# Patient Record
Sex: MALE | Race: WHITE | NOT HISPANIC OR LATINO | Employment: OTHER | URBAN - METROPOLITAN AREA
[De-identification: names, ages, dates, MRNs, and addresses within clinical notes are randomized per-mention and may not be internally consistent; named-entity substitution may affect disease eponyms.]

---

## 2017-08-10 ENCOUNTER — OFFICE VISIT (OUTPATIENT)
Dept: GASTROENTEROLOGY | Facility: CLINIC | Age: 72
End: 2017-08-10
Payer: MEDICARE

## 2017-08-10 ENCOUNTER — HOSPITAL ENCOUNTER (OUTPATIENT)
Dept: RADIOLOGY | Facility: HOSPITAL | Age: 72
Discharge: HOME | End: 2017-08-10
Attending: NURSE PRACTITIONER
Payer: MEDICARE

## 2017-08-10 VITALS
HEIGHT: 62 IN | BODY MASS INDEX: 23.37 KG/M2 | DIASTOLIC BLOOD PRESSURE: 72 MMHG | HEART RATE: 80 BPM | SYSTOLIC BLOOD PRESSURE: 128 MMHG | WEIGHT: 127 LBS | RESPIRATION RATE: 18 BRPM

## 2017-08-10 DIAGNOSIS — Z87.09 HISTORY OF COPD: ICD-10-CM

## 2017-08-10 DIAGNOSIS — Z99.81 ON SUPPLEMENTAL OXYGEN THERAPY: ICD-10-CM

## 2017-08-10 DIAGNOSIS — R68.81 EARLY SATIETY: ICD-10-CM

## 2017-08-10 DIAGNOSIS — Z80.0 FAMILY HISTORY OF COLON CANCER: ICD-10-CM

## 2017-08-10 DIAGNOSIS — R14.0 ABDOMINAL BLOATING: Primary | ICD-10-CM

## 2017-08-10 DIAGNOSIS — R14.0 ABDOMINAL BLOATING: ICD-10-CM

## 2017-08-10 PROCEDURE — 74020 XR ABDOMEN FLAT AND ERECT: CPT | Mod: TC,PO

## 2017-08-10 PROCEDURE — 99999 PR PBB SHADOW E&M-EST. PATIENT-LVL IV: CPT | Mod: PBBFAC,,, | Performed by: NURSE PRACTITIONER

## 2017-08-10 PROCEDURE — 1126F AMNT PAIN NOTED NONE PRSNT: CPT | Mod: S$GLB,,, | Performed by: NURSE PRACTITIONER

## 2017-08-10 PROCEDURE — 74020 XR ABDOMEN FLAT AND ERECT: CPT | Mod: 26,,, | Performed by: RADIOLOGY

## 2017-08-10 PROCEDURE — 1159F MED LIST DOCD IN RCRD: CPT | Mod: S$GLB,,, | Performed by: NURSE PRACTITIONER

## 2017-08-10 PROCEDURE — 3008F BODY MASS INDEX DOCD: CPT | Mod: S$GLB,,, | Performed by: NURSE PRACTITIONER

## 2017-08-10 PROCEDURE — 3078F DIAST BP <80 MM HG: CPT | Mod: S$GLB,,, | Performed by: NURSE PRACTITIONER

## 2017-08-10 PROCEDURE — 99214 OFFICE O/P EST MOD 30 MIN: CPT | Mod: S$GLB,,, | Performed by: NURSE PRACTITIONER

## 2017-08-10 PROCEDURE — 3074F SYST BP LT 130 MM HG: CPT | Mod: S$GLB,,, | Performed by: NURSE PRACTITIONER

## 2017-08-10 NOTE — PROGRESS NOTES
Subjective:       Patient ID: Francois Otero Sr. is a 72 y.o. male Body mass index is 23.23 kg/m².    Chief Complaint: Bloated    This patient is new to me.    GI Problem   The primary symptoms include abdominal pain (once had epigastric discomfort; denies any since). Primary symptoms do not include fever, weight loss, fatigue, nausea, vomiting, diarrhea, melena, hematemesis, hematochezia or dysuria. Primary symptoms comment: CHIEF COMPLAINT:BLOATING. The illness began more than 7 days ago (started about a year ago with bloating and early satiety). The problem has not changed since onset.  The illness is also significant for bloating (CHIEF COMPLAINT: fullness to abdomen; improved when he wears his back brace). The illness does not include chills, anorexia, dysphagia, odynophagia or constipation. Associated symptoms comments: Bowel movements once daily. Associated medical issues do not include inflammatory bowel disease, GERD, gallstones, liver disease, PUD or bowel resection. Associated medical issues comments: denies prior colonoscopy.     Review of Systems   Constitutional: Negative for appetite change, chills, fatigue, fever, unexpected weight change and weight loss.   HENT: Negative for sore throat and trouble swallowing.    Respiratory: Negative for cough, choking and shortness of breath.    Cardiovascular: Negative for chest pain.   Gastrointestinal: Positive for abdominal pain (once had epigastric discomfort; denies any since) and bloating (CHIEF COMPLAINT: fullness to abdomen; improved when he wears his back brace). Negative for anal bleeding, anorexia, blood in stool, constipation, diarrhea, dysphagia, hematemesis, hematochezia, melena, nausea, rectal pain and vomiting.   Genitourinary: Negative for difficulty urinating, dysuria, flank pain, frequency and urgency.   Neurological: Negative for weakness.       Past Medical History:   Diagnosis Date    COPD (chronic obstructive pulmonary disease)      Coronary artery disease     Hyperlipidemia     Hypertension     On home oxygen therapy     Tobacco abuse     1 PPD X 59 Yrs    Vertigo      Past Surgical History:   Procedure Laterality Date    by pass on leg       CARDIAC CATHETERIZATION      cardiac stents        Family History   Problem Relation Age of Onset    Cancer Mother     Emphysema Mother     Colon cancer Mother      unsure of age of diagnosis    Heart disease Father     Crohn's disease Neg Hx     Ulcerative colitis Neg Hx     Stomach cancer Neg Hx     Esophageal cancer Neg Hx      Wt Readings from Last 10 Encounters:   08/10/17 57.6 kg (127 lb)   07/24/17 57.1 kg (125 lb 12.8 oz)   12/29/16 56 kg (123 lb 7.3 oz)   12/21/16 58.1 kg (128 lb)   05/25/16 56.7 kg (125 lb)   05/20/16 56.7 kg (125 lb)   11/18/15 54 kg (119 lb)   11/09/15 54 kg (119 lb)   09/28/15 51.7 kg (114 lb)   09/25/15 49 kg (108 lb)     Lab Results   Component Value Date    WBC 8.47 07/20/2017    HGB 14.5 07/20/2017    HCT 44.3 07/20/2017    MCV 94 07/20/2017     07/20/2017     CMP  Sodium   Date Value Ref Range Status   07/20/2017 142 136 - 145 mmol/L Final     Potassium   Date Value Ref Range Status   07/20/2017 4.2 3.5 - 5.1 mmol/L Final     Chloride   Date Value Ref Range Status   07/20/2017 105 95 - 110 mmol/L Final     CO2   Date Value Ref Range Status   07/20/2017 26 22 - 31 mmol/L Final     Glucose   Date Value Ref Range Status   07/20/2017 100 70 - 110 mg/dL Final     Comment:     The ADA recommends the following guidelines for fasting glucose:  Normal:       less than 100 mg/dL  Prediabetes:  100 mg/dL to 125 mg/dL  Diabetes:     126 mg/dL or higher       BUN, Bld   Date Value Ref Range Status   07/20/2017 13 9 - 21 mg/dL Final     Creatinine   Date Value Ref Range Status   07/20/2017 0.84 0.50 - 1.40 mg/dL Final     Calcium   Date Value Ref Range Status   07/20/2017 9.8 8.4 - 10.2 mg/dL Final     Total Protein   Date Value Ref Range Status   12/29/2016 8.2  6.0 - 8.4 g/dL Final     Albumin   Date Value Ref Range Status   12/29/2016 4.4 3.5 - 5.2 g/dL Final     Total Bilirubin   Date Value Ref Range Status   12/29/2016 0.5 0.2 - 1.3 mg/dL Final     Comment:     For infants and newborns, interpretation of results should be based  on gestational age, weight and in agreement with clinical  observations.  Premature Infant recommended reference ranges:  Up to 24 hours.............<8.0 mg/dL  Up to 48 hours............<12.0 mg/dL  3-5 days..................<15.0 mg/dL  6-29 days.................<15.0 mg/dL       Alkaline Phosphatase   Date Value Ref Range Status   12/29/2016 77 38 - 145 U/L Final     AST   Date Value Ref Range Status   12/29/2016 30 17 - 59 U/L Final     ALT   Date Value Ref Range Status   12/29/2016 18 10 - 44 U/L Final     Anion Gap   Date Value Ref Range Status   07/20/2017 11 8 - 16 mmol/L Final     eGFR if    Date Value Ref Range Status   07/20/2017 >60 >60 mL/min/1.73 m^2 Final     eGFR if non    Date Value Ref Range Status   07/20/2017 >60 >60 mL/min/1.73 m^2 Final     Comment:     Calculation used to obtain the estimated glomerular filtration  rate (eGFR) is the CKD-EPI equation. Since race is unknown   in our information system, the eGFR values for   -American and Non--American patients are given   for each creatinine result.       Reviewed prior medical records including radiology report of 12/29/16 abdominal x-ray and ct abdomen pelvis.    Objective:      Physical Exam   Constitutional: He is oriented to person, place, and time. He appears well-developed and well-nourished. No distress.   HENT:   Mouth/Throat: Oropharynx is clear and moist and mucous membranes are normal. No oral lesions. No oropharyngeal exudate.   Eyes: Conjunctivae are normal. Pupils are equal, round, and reactive to light. No scleral icterus.   Neck: Neck supple. No tracheal deviation present.   Cardiovascular: Normal rate.     Pulmonary/Chest: Effort normal and breath sounds normal. No respiratory distress. He has no wheezes.   Abdominal: Soft. Normal appearance and bowel sounds are normal. He exhibits no distension, no abdominal bruit and no mass. There is no hepatosplenomegaly. There is no tenderness. There is no rigidity, no rebound, no guarding, no tenderness at McBurney's point and negative Tian's sign. No hernia.   Lymphadenopathy:     He has no cervical adenopathy.   Neurological: He is alert and oriented to person, place, and time.   Skin: Skin is warm and dry. No rash noted. He is not diaphoretic. No erythema. No pallor.   Non-jaundiced   Psychiatric: He has a normal mood and affect. His behavior is normal.   Nursing note and vitals reviewed.      Assessment:       1. Abdominal bloating    2. On supplemental oxygen therapy    3. History of COPD    4. Early satiety    5. Family history of colon cancer        Plan:       Abdominal bloating  -     X-Ray Abdomen Flat And Erect; Future; Expected date: 08/10/2017  -     H.Pylori Antibody IgG; Future; Expected date: 08/10/2017  - recommended OTC simethicone as directed, such as Phazyme or Gas-x  -discussed with patient about low gas diet: Reduce or eliminate these foods from your diet: Broccoli, Cauliflower, [place] sprouts, Cabbage, Cooked dried beans, Carbonated beverages (sparkling water, soda, beer, champagne)  Other Causes Of Excess Gas Include:   1) EATING TOO FAST or TALKING WHILE YOU CHEW may cause you to swallow air. This increases the amount of gas in the stomach and may worsen your symptoms.  --> Chew each mouthful completely before swallowing. Take your time.  2) OVEREATING may increase the feeling of being bloated and cause more gas.  --> When you are full, stop eating.  3) CONSTIPATION can increase the amount of normal intestinal gas.  --> Avoid constipation by increasing the amount of fiber in your diet by including whole cereal grains, fresh vegetables (except those  in the above list) and fresh fruits. High-fiber foods absorb water and carry it out of the body. When increasing the amount of fiber in your diet, you also need to increase the amount of water that you drink. You should drink at least eight 8-ounce glasses of water (two quarts) per day.  - schedule EGD, discussed procedure with patient, verbalized understanding  - schedule Colonoscopy, discussed procedure with the patient, verbalized understanding    On supplemental oxygen therapy & History of COPD  - patient not wearing currently, wears it only prn  Recommend follow-up with Primary Care Provider/pulmonology for continued evaluation and management.    Early satiety  -     X-Ray Abdomen Flat And Erect; Future; Expected date: 08/10/2017  -     H.Pylori Antibody IgG; Future; Expected date: 08/10/2017  - schedule EGD, discussed procedure with patient, verbalized understanding  -Educated patient to: avoid large meals, avoid eating within 2-3 hours of bedtime (avoid late night eating & lying down soon after eating), elevate head of bed if nocturnal symptoms are present, smoking cessation (if current smoker), & weight loss (if overweight).   -Educated to minimize/avoid high-fat foods, chocolate, caffeine, citrus, alcohol, & tomato products.  -Advised to avoid/limit use of NSAID's, since they can cause GI upset, bleeding, and/or ulcers. If needed, take with food.    Family history of colon cancer  - schedule Colonoscopy, discussed procedure with the patient, verbalized understanding    Return in about 2 months (around 10/10/2017), or if symptoms worsen or fail to improve.      If no improvement in symptoms or symptoms worsen, call/follow-up at clinic or go to ER.

## 2017-08-10 NOTE — PATIENT INSTRUCTIONS
Excess Gas  Certain foods produce gas when digested. In some people, these foods make an excessive amount of gas. This may cause bloating, burping, or increased gas passing through the rectum (flatulence).    Foods that cause gas  The following foods are more likely to cause this problem. Limit them, or remove them from your diet:  · Broccoli  · Cauliflower  · [place] sprouts  · Cabbage  · Cooked dried beans  · Fizzy (carbonated) drinks, such as sparkling water, soda, beer, and champagne  Other causes  Other causes of excess gas include:  · Eating too fast or talking while you chew. This may cause you to swallow air. This increases the amount of gas in your stomach. And it may make your symptoms worse. Chew each mouthful completely before you swallow. Take your time.  · Chewing on gum or sucking on hard candy. These cause you to swallow more often. And some of what you are swallowing is air. This leads to more gas in your stomach. Avoid chewing gum and hard candy.  · Overeating. This may increase the feeling of being bloated and cause more gas. When you are full, stop eating.   · Being constipated. This can increase the amount of normal intestinal gas. Avoid constipation by getting more fiber in your diet. Good sources of fiber include whole-grain cereal, fresh vegetables (except those in the above list), and fresh fruits. High-fiber foods absorb water and carry it out of the body. When adding more fiber to your diet, you also need to drink more water. You should drink at least 8, 8-ounce glasses of water (2 quarts) per day.  Date Last Reviewed: 8/1/2016  © 1440-2425 [employer]. [address] [place] [address], [place] [address]. All rights reserved. This information is not intended as a substitute for professional medical care. Always follow your healthcare professional's instructions.

## 2017-08-11 ENCOUNTER — TELEPHONE (OUTPATIENT)
Dept: GASTROENTEROLOGY | Facility: CLINIC | Age: 72
End: 2017-08-11

## 2017-08-11 NOTE — TELEPHONE ENCOUNTER
Please call to inform & review the results with the patient- abdominal x-ray showed no signs of intestinal obstruction, normal bowel gas pattern, but moderate amount of stool in the colon which can suggest constipation. Recommend high fiber diet/OTC fiber supplements daily as directed, drink 3-4 16 oz bottles of water daily and can try Miralax daily x 7 days and PRN thereafter.  Continue with previous recommendations and schedule colonoscopy to further evaluate symptoms.    Thanks,  Janis DUMAS FNP-C

## 2017-08-16 ENCOUNTER — TELEPHONE (OUTPATIENT)
Dept: GASTROENTEROLOGY | Facility: CLINIC | Age: 72
End: 2017-08-16

## 2017-08-16 NOTE — TELEPHONE ENCOUNTER
Pt notified of results and advised that he needs procedures scheduled. Pt is requesting that the call be made later this afternoon, after 3pm is possible.

## 2017-08-16 NOTE — TELEPHONE ENCOUNTER
----- Message from Kylie Monterroso sent at 8/16/2017  9:28 AM CDT -----  Contact: self  Patient [phone] is returning call to Nurse from this morning/please call/

## 2017-08-16 NOTE — TELEPHONE ENCOUNTER
----- Message from VANGIE Patterson sent at 8/14/2017  2:25 PM CDT -----  Please call to inform & review the results with the patient- blood work negative for h pylori bacteria. Continue with previous recommendations and schedule colonoscopy and EGD to further evaluate symptoms.  Thanks,  Janis VENCES-BIRDIE

## 2017-08-16 NOTE — TELEPHONE ENCOUNTER
"Spoke to pt.  Explained given his history of COPD would be best for pt to be scoped at [facility] "that is not going to happen"  I then offered to have the anesthesiologist review record and if they approve we can do here if not then he would need to go to the main campus "well that is not going to happen either"      Message given to [facility] nurse   "

## 2017-08-17 ENCOUNTER — TELEPHONE (OUTPATIENT)
Dept: SURGERY | Facility: HOSPITAL | Age: 72
End: 2017-08-17

## 2017-08-17 NOTE — TELEPHONE ENCOUNTER
Spoke with pt about his pulmonary function and relayed information to anesthesia, Dr. Rosales. Dr. Rosales stated pt is not a candidate to have EGD here at this facility. I informed pt that if he needed to have procedure at hospital it is for his safety. He was confused and thought that the drs didn't want to do procedure at all. After explaining, he seemed to be OK if needing to have procedure at hospital as outpatient instead of just an outpatient facility. Please call and inform patient about needing to have EGD at hospital.

## 2017-08-18 NOTE — TELEPHONE ENCOUNTER
S/w pt he understands that we can not do his EGD here that he must go to a hospital with his medical history. Pt stated [facility] does not take his insurance so I recommended pt go to [facility] to see one of our GI guys there or main campus. Pt stated he is seeing someone on Monday and our services were no longer needed.

## 2017-08-21 ENCOUNTER — OFFICE VISIT (OUTPATIENT)
Dept: GASTROENTEROLOGY | Facility: CLINIC | Age: 72
End: 2017-08-21
Payer: MEDICARE

## 2017-08-21 VITALS
WEIGHT: 126.31 LBS | BODY MASS INDEX: 23.24 KG/M2 | DIASTOLIC BLOOD PRESSURE: 74 MMHG | SYSTOLIC BLOOD PRESSURE: 140 MMHG | HEIGHT: 62 IN | RESPIRATION RATE: 18 BRPM

## 2017-08-21 DIAGNOSIS — R68.81 EARLY SATIETY: ICD-10-CM

## 2017-08-21 DIAGNOSIS — Z80.0 FAMILY HISTORY OF COLON CANCER: ICD-10-CM

## 2017-08-21 DIAGNOSIS — Z99.81 ON SUPPLEMENTAL OXYGEN THERAPY: ICD-10-CM

## 2017-08-21 DIAGNOSIS — Z87.09 HISTORY OF COPD: ICD-10-CM

## 2017-08-21 DIAGNOSIS — R14.0 ABDOMINAL BLOATING: Primary | ICD-10-CM

## 2017-08-21 PROCEDURE — 3077F SYST BP >= 140 MM HG: CPT | Mod: S$GLB,,, | Performed by: NURSE PRACTITIONER

## 2017-08-21 PROCEDURE — 1159F MED LIST DOCD IN RCRD: CPT | Mod: S$GLB,,, | Performed by: NURSE PRACTITIONER

## 2017-08-21 PROCEDURE — 99999 PR PBB SHADOW E&M-EST. PATIENT-LVL III: CPT | Mod: PBBFAC,,, | Performed by: NURSE PRACTITIONER

## 2017-08-21 PROCEDURE — 1126F AMNT PAIN NOTED NONE PRSNT: CPT | Mod: S$GLB,,, | Performed by: NURSE PRACTITIONER

## 2017-08-21 PROCEDURE — 3008F BODY MASS INDEX DOCD: CPT | Mod: S$GLB,,, | Performed by: NURSE PRACTITIONER

## 2017-08-21 PROCEDURE — 3078F DIAST BP <80 MM HG: CPT | Mod: S$GLB,,, | Performed by: NURSE PRACTITIONER

## 2017-08-21 PROCEDURE — 99214 OFFICE O/P EST MOD 30 MIN: CPT | Mod: S$GLB,,, | Performed by: NURSE PRACTITIONER

## 2017-08-21 RX ORDER — LEVALBUTEROL INHALATION SOLUTION 1.25 MG/3ML
SOLUTION RESPIRATORY (INHALATION)
Refills: 11 | COMMUNITY
Start: 2017-07-26 | End: 2017-09-13 | Stop reason: ALTCHOICE

## 2017-08-21 NOTE — PATIENT INSTRUCTIONS
Colorectal Cancer Screening    Colorectal cancer (cancer in the colon or rectum) is a leading cause of cancer deaths in the [place] But it doesnt have to be. When this cancer is found and removed early, the chances of a full recovery are very good. Because colorectal cancer rarely causes symptoms in its early stages, screening for the disease is important. Its even more crucial if you have risk factors for the disease. Learn more about colorectal cancer and its risk factors. Then talk to your healthcare provider about being screened. You could be saving your own life.  Risk factors for colorectal cancer  Your risk of having colorectal cancer increases if you:  · Are 50 years of age or older  · Have a family history or personal history of colorectal cancer or polyps  · Have a personal history of type 2 diabetes, Crohns disease, or ulcerative colitis  · Have an inherited genetic syndrome like Brennan syndrome (also known as HNPCC) or familial adenomatous polyposis (FAP)  · Are very overweight  · Are not physically active  · Smoke  · Drink a lot of alcohol  · Eat a lot of red or processed meat  The colon and rectum  Waste from food you eat enters the colon from the small intestine. As it travels through the colon, the waste (stool) loses water and becomes more solid. Intestinal muscles push it toward the sigmoid--the last section of the colon. Stool then moves into the rectum, where its stored until its ready to leave the body during a bowel movement.  How cancer develops  Polyps are growths that form on the inner lining of the colon or rectum. Most are benign, which means they arent cancerous. But over time, some polyps can become cancer (malignant). This happens when cells in these polyps begin growing abnormally. In time, malignant cells invade more and more of the colon and rectum. The cancer may also spread to nearby organs or lymph nodes or to other parts of the body. Finding and removing polyps can help  prevent cancer from ever forming.  Your screening  Screening means looking for a health problem before you have symptoms. During screening for colorectal cancer, your healthcare provider will ask about your health history, examine you, and do one or more tests.  History and exam  The history and exam involve the following:  · Health history. Your healthcare provider will ask about your health history. Mention if a family member has had colon cancer or polyps. Also mention any health problems you have had in the past.  · Digital rectal exam (DAYANARA). During a DAYANARA, the healthcare provider inserts a lubricated gloved finger into the rectum. The test is painless and takes less than a minute. Healthcare providers agree that this test alone is not enough to screen for colorectal cancer.  Screening test choices  Fecal occult blood test (FOBT) or fecal immunochemical test (FIT)  These tests check for occult blood in stool (blood you cant see). Hidden blood may be a sign of colon polyps or cancer. A small sample of stool is tested for blood in a laboratory. Most often, you collect this sample at home using a kit your healthcare provider gives you. Follow the instructions carefully for using this kit. You might need to avoid certain foods and medicines before the test, as directed.  Barium enema with contrast (double-contrast barium enema)  This test uses X-rays to provide images of the entire colon and rectum. The day before this test, you will need to do a bowel prep to clean out the colon and rectum. A bowel prep is a liquid diet plus strong laxatives or enemas. You will be awake for the test, but you may be given medicine to help you relax. At the start of the test, a radiologist (a healthcare provider who specializes in imaging tests) places a soft tube into the rectum. The tube is used to fill the colon with a contrast liquid (barium) and air. This can be uncomfortable for some people. The liquid helps the colon show up  clearly on the X-rays. Because the test uses X-rays, it exposes you to a small amount of radiation.  Virtual colonoscopy  This exam is also called a CT colonography. It uses a series of X-ray photographs to create a 3-D view of the colon and rectum. The day before the test, you will need to do a bowel prep to clean out your colon. Your healthcare provider will give you instructions on how to do this. During the procedure, you will lie on a table that is part of a special X-ray machine called a CT scanner. A small tube will be placed into your rectum to fill the colon and rectum with air. This can be uncomfortable for some people. Then, the table will move into the machine and pictures will be taken of your colon and rectum. A computer will combine these photos to create a 3-D picture. Because the test uses X-rays, it exposes you to a small amount of radiation.  Scope exams  Here are two types of scope exams:  · Colonoscopy. This test can be used to find and remove polyps anywhere in the colon or rectum. The day before the test, you will do a bowel prep. This is a liquid diet plus a strong laxative solution or an enema. The bowel prep will cleanse your colon. You will be given instructions for this. Just before the test, you are given a medicine to make you sleepy. Then, a long, flexible, lighted tube called a colonoscope is gently inserted into the rectum and guided through the entire colon. Images of the colon are viewed on a video screen. Any polyps that are found are removed and sent to a lab for testing. If a polyp cant be removed, a sample of tissue is taken and the polyp might be removed later during surgery. You will need to bring someone with you to drive you home after this test.   · Sigmoidoscopy. This test is similar to colonoscopy, but focuses only on the sigmoid colon and rectum. As with colonoscopy, bowel prep must be done the day before this test. It might not need to be as complete as the bowel prep  for a colonoscopy. You are awake during the procedure, but you may be given medicine to help you relax. During the test, the healthcare provider guides a thin, flexible, lighted tube called a sigmoidoscope through your rectum and lower colon. The images are displayed on a video screen. Polyps are removed, if possible, and sent to a lab for testing.  Colonoscopy is the only screening test that lets your healthcare provider see the entire colon and rectum. This test also lets your healthcare provider remove any pieces of tissue that need to be looked at by a lab. If something suspicious is found using any other tests, you will likely need a colonoscopy.     When to call your healthcare provider after a test  Call your healthcare provider if you have any of the following after any screening test:  · Bleeding  · Fever of 100.4°F (38°C) or higher, or as directed by your healthcare provider  · Abdominal pain  · Vomiting   Date Last Reviewed: 11/4/2015  © 2104-5787 [employer]. [address] [place] [address]. All rights reserved. This information is not intended as a substitute for professional medical care. Always follow your healthcare professional's instructions.        Abdominal Pain    Abdominal pain is pain in the stomach or belly area. Everyone has this pain from time to time. In many cases it goes away on its own. But abdominal pain can sometimes be due to a serious problem, such as appendicitis. So its important to know when to seek help.  Causes of abdominal pain  There are many possible causes of abdominal pain. Common causes in adults include:  · Constipation, diarrhea, or gas  · Stomach acid flowing back up into the esophagus (acid reflux or heartburn)  · Severe acid reflux, called GERD (gastroesophageal reflux disease)  · A sore in the lining of the stomach or small intestine (peptic ulcer)  · Inflammation of the gallbladder, liver, or pancreas  · Gallstones or kidney  stones  · Appendicitis   · Intestinal blockage   · An internal organ pushing through a muscle or other tissue (hernia)  · Urinary tract infections  · In women, menstrual cramps, fibroids, or endometriosis  · Inflammation or infection of the intestines  Diagnosing the cause of abdominal pain  Your healthcare provider will do a physical exam help find the cause of your pain. If needed, tests will be ordered. Belly pain has many possible causes. So it can be hard to find the reason for your pain. Giving details about your pain can help. Tell your provider where and when you feel the pain, and what makes it better or worse. Also let your provider know if you have other symptoms such as:  · Fever  · Tiredness  · Upset stomach (nausea)  · Vomiting  · Changes in bathroom habits  Treating abdominal pain  Some causes of pain need emergency medical treatment right away. These include appendicitis or a bowel blockage. Other problems can be treated with rest, fluids, or medicines. Your healthcare provider can give you specific instructions for treatment or self-care based on what is causing your pain.  If you have vomiting or diarrhea, sip water or other clear fluids. When you are ready to eat solid foods again, start with small amounts of easy-to-digest, low-fat foods. These include apple sauce, toast, or crackers.   When to seek medical care  Call 911 or go to the hospital right away if you:  · Cant pass stool and are vomiting  · Are vomiting blood or have bloody diarrhea or black, tarry diarrhea  · Have chest, neck, or shoulder pain  · Feel like you might pass out  · Have pain in your shoulder blades with nausea  · Have sudden, severe belly pain  · Have new, severe pain unlike any you have felt before  · Have a belly that is rigid, hard, and tender to touch  Call your healthcare provider if you have:  · Pain for more than 5 days  · Bloating for more than 2 days  · Diarrhea for more than 5 days  · A fever of 100.4°F (38.0°C)  or higher, or as directed by your provider  · Pain that gets worse  · Weight loss for no reason  · Continued lack of appetite  · Blood in your stool  How to prevent abdominal pain  Here are some tips to help prevent abdominal pain:  · Eat smaller amounts of food at one time.  · Avoid greasy, fried, or other high-fat foods.  · Avoid foods that give you gas.  · Exercise regularly.  · Drink plenty of fluids.  To help prevent GERD symptoms:  · Quit smoking.  · Reduce alcohol and certain foods that increase stomach acid.  · Avoid aspirin and over-the-counter pain and fever medicines (NSAIDS or nonsteroidal anti-inflammatory drugs), if possible  · Lose extra weight.  · Finish eating at least 2 hours before you go to bed or lie down.  · Raise the head of your bed.  Date Last Reviewed: 7/1/2016  © 5729-5853 [employer]. [address] [place] [address], [place] [address]. All rights reserved. This information is not intended as a substitute for professional medical care. Always follow your healthcare professional's instructions.

## 2017-08-21 NOTE — PROGRESS NOTES
"Subjective:       Patient ID: Francois Otero Sr. is a 72 y.o. male Body mass index is 23.1 kg/m².    Chief Complaint: Abdominal discomfort ("aggravation like you're pushing down on your stomach"; normal BM daily; was suggested to have colonoscopy- patient wants alternative)    This is an established patient    Reviewed Dayne's telephone note (Dr. Syed's staff): "Spoke to pt.  Explained given his history of COPD would be best for pt to be scoped at [facility] "that is not going to happen"  I then offered to have the anesthesiologist review record and if they approve we can do here if not then he would need to go to the [facility] "well that is not going to happen either"    Message given to ASC nurse " Spoke with Dayne and she reports the anesthesia department here told her they would not do the scope here due to his history of MAHAMED. Reviewed with the patient that endoscopies would have to be done at [facility] or [facility] and discussed about imaging studies such as barium enema and UGI with SBFT to further evaluate symptoms but informed him if any positive findings on those studies he may still need endoscopies.       GI Problem   The primary symptoms include abdominal pain (once had epigastric discomfort; denies any since; reports abdominal fullness/bloating remains). Primary symptoms do not include fever, weight loss, fatigue, nausea, vomiting, diarrhea, melena, hematemesis, hematochezia or dysuria. Primary symptoms comment: CHIEF COMPLAINT:BLOATING. The illness began more than 7 days ago (started about a year ago with bloating and early satiety). The problem has not changed since onset.  The abdominal pain has been unchanged since its onset. The severity of the abdominal pain is 0/10. The abdominal pain is relieved by nothing.   The illness is also significant for bloating (CHIEF COMPLAINT: fullness to abdomen; improved when he wears his back brace). The illness does not include chills, anorexia, dysphagia, " odynophagia or constipation. Associated symptoms comments: Bowel movements once daily; Treatment: Miralax x 4 days without any change in bowel habits; took OTC dulcolax 7 tablets at one time and reports had a large bowel movement; but symptoms have remained. Associated medical issues do not include inflammatory bowel disease, GERD, gallstones, liver disease, PUD or bowel resection. Associated medical issues comments: denies prior colonoscopy.     Review of Systems   Constitutional: Negative for appetite change, chills, fatigue, fever, unexpected weight change and weight loss.   HENT: Negative for sore throat and trouble swallowing.    Respiratory: Negative for cough, choking and shortness of breath.    Cardiovascular: Negative for chest pain.   Gastrointestinal: Positive for abdominal pain (once had epigastric discomfort; denies any since; reports abdominal fullness/bloating remains) and bloating (CHIEF COMPLAINT: fullness to abdomen; improved when he wears his back brace). Negative for anal bleeding, anorexia, blood in stool, constipation, diarrhea, dysphagia, hematemesis, hematochezia, melena, nausea, rectal pain and vomiting.   Genitourinary: Negative for difficulty urinating, dysuria, flank pain, frequency and urgency.   Neurological: Negative for weakness.       Past Medical History:   Diagnosis Date    COPD (chronic obstructive pulmonary disease)     Coronary artery disease     Hyperlipidemia     Hypertension     On home oxygen therapy     Tobacco abuse     1 PPD X 59 Yrs    Vertigo      Past Surgical History:   Procedure Laterality Date    by pass on leg       CARDIAC CATHETERIZATION      cardiac stents        Family History   Problem Relation Age of Onset    Cancer Mother     Emphysema Mother     Colon cancer Mother      unsure of age of diagnosis    Heart disease Father     Crohn's disease Neg Hx     Ulcerative colitis Neg Hx     Stomach cancer Neg Hx     Esophageal cancer Neg Hx      Wt  Readings from Last 10 Encounters:   08/21/17 57.3 kg (126 lb 5.2 oz)   08/10/17 57.6 kg (127 lb)   07/24/17 57.1 kg (125 lb 12.8 oz)   12/29/16 56 kg (123 lb 7.3 oz)   12/21/16 58.1 kg (128 lb)   05/25/16 56.7 kg (125 lb)   05/20/16 56.7 kg (125 lb)   11/18/15 54 kg (119 lb)   11/09/15 54 kg (119 lb)   09/28/15 51.7 kg (114 lb)     Lab Results   Component Value Date    WBC 8.47 07/20/2017    HGB 14.5 07/20/2017    HCT 44.3 07/20/2017    MCV 94 07/20/2017     07/20/2017     CMP  Sodium   Date Value Ref Range Status   07/20/2017 142 136 - 145 mmol/L Final     Potassium   Date Value Ref Range Status   07/20/2017 4.2 3.5 - 5.1 mmol/L Final     Chloride   Date Value Ref Range Status   07/20/2017 105 95 - 110 mmol/L Final     CO2   Date Value Ref Range Status   07/20/2017 26 22 - 31 mmol/L Final     Glucose   Date Value Ref Range Status   07/20/2017 100 70 - 110 mg/dL Final     Comment:     The ADA recommends the following guidelines for fasting glucose:  Normal:       less than 100 mg/dL  Prediabetes:  100 mg/dL to 125 mg/dL  Diabetes:     126 mg/dL or higher       BUN, Bld   Date Value Ref Range Status   07/20/2017 13 9 - 21 mg/dL Final     Creatinine   Date Value Ref Range Status   07/20/2017 0.84 0.50 - 1.40 mg/dL Final     Calcium   Date Value Ref Range Status   07/20/2017 9.8 8.4 - 10.2 mg/dL Final     Total Protein   Date Value Ref Range Status   12/29/2016 8.2 6.0 - 8.4 g/dL Final     Albumin   Date Value Ref Range Status   12/29/2016 4.4 3.5 - 5.2 g/dL Final     Total Bilirubin   Date Value Ref Range Status   12/29/2016 0.5 0.2 - 1.3 mg/dL Final     Comment:     For infants and newborns, interpretation of results should be based  on gestational age, weight and in agreement with clinical  observations.  Premature Infant recommended reference ranges:  Up to 24 hours.............<8.0 mg/dL  Up to 48 hours............<12.0 mg/dL  3-5 days..................<15.0 mg/dL  6-29 days.................<15.0 mg/dL        Alkaline Phosphatase   Date Value Ref Range Status   12/29/2016 77 38 - 145 U/L Final     AST   Date Value Ref Range Status   12/29/2016 30 17 - 59 U/L Final     ALT   Date Value Ref Range Status   12/29/2016 18 10 - 44 U/L Final     Anion Gap   Date Value Ref Range Status   07/20/2017 11 8 - 16 mmol/L Final     eGFR if    Date Value Ref Range Status   07/20/2017 >60 >60 mL/min/1.73 m^2 Final     eGFR if non    Date Value Ref Range Status   07/20/2017 >60 >60 mL/min/1.73 m^2 Final     Comment:     Calculation used to obtain the estimated glomerular filtration  rate (eGFR) is the CKD-EPI equation. Since race is unknown   in our information system, the eGFR values for   -American and Non--American patients are given   for each creatinine result.       Lab Visit on 08/10/2017   Component Date Value Ref Range Status    H Pylori IgG Interp 08/14/2017 Negative  Negative Final     Reviewed prior medical records including radiology report of 12/29/16 abdominal x-ray and ct abdomen pelvis; and 8/10/17 abdominal x-ray.    Objective:      Physical Exam   Constitutional: He is oriented to person, place, and time. He appears well-developed and well-nourished. No distress.   HENT:   Mouth/Throat: Oropharynx is clear and moist and mucous membranes are normal. No oral lesions. No oropharyngeal exudate.   Eyes: Conjunctivae are normal. No scleral icterus.   Cardiovascular: Normal rate.    Pulmonary/Chest: Effort normal and breath sounds normal. No respiratory distress.   Abdominal: Soft. Normal appearance and bowel sounds are normal. He exhibits no distension, no abdominal bruit and no mass. There is no hepatosplenomegaly. There is no tenderness. There is no rigidity, no rebound, no guarding, no tenderness at McBurney's point and negative Tian's sign. No hernia.   Neurological: He is alert and oriented to person, place, and time.   Skin: Skin is warm and dry. No rash noted. He is  not diaphoretic. No erythema. No pallor.   Non-jaundiced   Psychiatric: He has a normal mood and affect. His behavior is normal.   Nursing note and vitals reviewed.      Assessment:       1. Abdominal bloating    2. Early satiety    3. Family history of colon cancer    4. On supplemental oxygen therapy    5. History of COPD        Plan:     Patient reports he wants to think about further testing for now and will contact us to schedule the endoscopies when he decides to proceed with it. Possible endoscopies to be done at [facility]- will discuss with Dr. Charles tomorrow.    Other orders  -     Cancel: [place] Upper GI With Small Bowel; Future; Expected date: 08/21/2017  -     Cancel: [place] Barium Enema Air Contrast; Future; Expected date: 08/21/2017  Patient refused the above testing    Abdominal bloating  - recommended OTC simethicone as directed, such as Phazyme or Gas-x  -discussed with patient about low gas diet: Reduce or eliminate these foods from your diet: Broccoli, Cauliflower, Cherokee sprouts, Cabbage, Cooked dried beans, Carbonated beverages (sparkling water, soda, beer, champagne)  Other Causes Of Excess Gas Include:   1) EATING TOO FAST or TALKING WHILE YOU CHEW may cause you to swallow air. This increases the amount of gas in the stomach and may worsen your symptoms.  --> Chew each mouthful completely before swallowing. Take your time.  2) OVEREATING may increase the feeling of being bloated and cause more gas.  --> When you are full, stop eating.  3) CONSTIPATION can increase the amount of normal intestinal gas.  --> Avoid constipation by increasing the amount of fiber in your diet by including whole cereal grains, fresh vegetables (except those in the above list) and fresh fruits. High-fiber foods absorb water and carry it out of the body. When increasing the amount of fiber in your diet, you also need to increase the amount of water that you drink. You should drink at least eight 8-ounce glasses of  water (two quarts) per day.  - schedule EGD, discussed procedure with patient, verbalized understanding  - schedule Colonoscopy, discussed procedure with the patient, verbalized understanding    On supplemental oxygen therapy & History of COPD  - patient not wearing currently, wears it only prn  Recommend follow-up with Primary Care Provider/pulmonology for continued evaluation and management.    Early satiety  - schedule EGD, discussed procedure with patient, verbalized understanding  - avoid large meals, avoid eating within 2-3 hours of bedtime (avoid late night eating & lying down soon after eating), elevate head of bed if nocturnal symptoms are present, smoking cessation (if current smoker), & weight loss (if overweight).   - minimize/avoid high-fat foods, chocolate, caffeine, citrus, alcohol, & tomato products.  -avoid/limit use of NSAID's, since they can cause GI upset, bleeding, and/or ulcers. If needed, take with food.    Family history of colon cancer  - schedule Colonoscopy, discussed procedure with the patient, verbalized understanding    Return in about 1 month (around 9/21/2017), or if symptoms worsen or fail to improve.      If no improvement in symptoms or symptoms worsen, call/follow-up at clinic or go to ER.

## 2017-08-23 ENCOUNTER — TELEPHONE (OUTPATIENT)
Dept: GASTROENTEROLOGY | Facility: CLINIC | Age: 72
End: 2017-08-23

## 2017-08-23 NOTE — TELEPHONE ENCOUNTER
I discussed this case with Dr. Charles and he agreed that if patient wants to proceed with EGD and colonoscopy that he can do them at [facility] in [place] if this would help them.  Please inform the patient of the above information and schedule patient for endoscopies if patient is agreeable.  Thanks  MICHAEL

## 2017-08-24 NOTE — TELEPHONE ENCOUNTER
Pt has been scheduled for colon on 9/14. Reviewed mg citrate prep with pt. He is aware I will be mailing instructions and confirmation letter.

## 2017-08-24 NOTE — TELEPHONE ENCOUNTER
If patient wants to proceed with scheduling endoscopies. I believe he is a Dr. Yepez patient. Message forward message to his staff.  Thanks  MICHAEL

## 2017-08-25 ENCOUNTER — TELEPHONE (OUTPATIENT)
Dept: ENDOSCOPY | Facility: HOSPITAL | Age: 72
End: 2017-08-25

## 2017-08-25 NOTE — TELEPHONE ENCOUNTER
Clifford Garcia,    We received a message in the surgery center from Mr Otero's wife, Heather Angel, concerning his procedure scheduled for 9/14/17. She stated that they have additional questions about the procedure specifically where the procedure will be and if he is having a colonoscopy and EGD at the same time. Can you please call at your earliest convenience to help answer their questions.     Thank You,    Юлия Bentley

## 2017-08-25 NOTE — TELEPHONE ENCOUNTER
S/w pt wife she is aware I mailed detailed instructions yesterday after we spoke. Answered all questions regarding EGD/colon.

## 2017-09-14 PROBLEM — Z80.0 FHX: COLON CANCER: Status: ACTIVE | Noted: 2017-09-14

## 2017-09-19 ENCOUNTER — TELEPHONE (OUTPATIENT)
Dept: GASTROENTEROLOGY | Facility: CLINIC | Age: 72
End: 2017-09-19

## 2017-09-19 NOTE — TELEPHONE ENCOUNTER
----- Message from Gaetano Kelly sent at 9/19/2017  2:34 PM CDT -----  Contact: wife/Jayleen Clemens called in regarding the attached patient () and wanted to see if the results from his colonoscopy that was done on 9/14 were in yet.  Call back number is [phone]

## 2017-09-19 NOTE — TELEPHONE ENCOUNTER
----- Message from Darren Nagy sent at 9/19/2017  4:13 PM CDT -----  Contact: Heather Angel patient's wife called to discuss test results.please call back at [phone]. thanks

## 2017-09-19 NOTE — TELEPHONE ENCOUNTER
S/w pt wife reviewed results from EGD/colon. Pt is complaining of continued symptoms. He would like further recommendations. Please advise.

## 2017-09-20 NOTE — TELEPHONE ENCOUNTER
S/w pt wife reviewed recommendations. Pt does not want to start carafate without further discussing with DR. Yepez. Pt already has an appt with Dr. Yepez on October 12.

## 2017-10-02 ENCOUNTER — OFFICE VISIT (OUTPATIENT)
Dept: SURGERY | Facility: CLINIC | Age: 72
End: 2017-10-02
Payer: MEDICARE

## 2017-10-02 VITALS
BODY MASS INDEX: 23.39 KG/M2 | SYSTOLIC BLOOD PRESSURE: 152 MMHG | HEART RATE: 72 BPM | DIASTOLIC BLOOD PRESSURE: 70 MMHG | TEMPERATURE: 98 F | WEIGHT: 127.88 LBS

## 2017-10-02 DIAGNOSIS — R10.9 ABDOMINAL PAIN, UNSPECIFIED ABDOMINAL LOCATION: Primary | ICD-10-CM

## 2017-10-02 PROCEDURE — 99203 OFFICE O/P NEW LOW 30 MIN: CPT | Mod: S$GLB,,, | Performed by: SURGERY

## 2017-10-02 PROCEDURE — 99999 PR PBB SHADOW E&M-EST. PATIENT-LVL III: CPT | Mod: PBBFAC,,, | Performed by: SURGERY

## 2017-10-02 RX ORDER — LEVALBUTEROL INHALATION SOLUTION 1.25 MG/3ML
SOLUTION RESPIRATORY (INHALATION)
COMMUNITY
Start: 2017-09-26 | End: 2020-09-10

## 2017-10-02 NOTE — LETTER
October 6, 2017      Robb Yepez MD  [address] [facility] [address] [place] [address]           [facility]  [address] [facility] [address] [place] [address]  Phone: [phone]          Patient: Francois Otero Sr.   MR Number: [id number]   YOB: 1945   Date of Visit: 10/2/2017       Dear Dr. Robb Yepez:    Thank you for referring Francois Otero to me for evaluation. Attached you will find relevant portions of my assessment and plan of care.    If you have questions, please do not hesitate to call me. I look forward to following Francois Otero along with you.    Sincerely,    Peña Coles  CC:  No Recipients    If you would like to receive this communication electronically, please contact [email or web address] or [phone] to request more information on [employer] access.    For providers and/or their staff who would like to refer a patient to [facility], please contact us through our one-stop-shop provider referral line, Gilberto Lira, at [phone].    If you feel you have received this communication in error or would no longer like to receive these types of communications, please e-mail [email or web address]

## 2017-10-03 ENCOUNTER — TELEPHONE (OUTPATIENT)
Dept: GASTROENTEROLOGY | Facility: CLINIC | Age: 72
End: 2017-10-03

## 2017-10-03 NOTE — TELEPHONE ENCOUNTER
Spoke to pt & his wife, instr to come see KP, NP to discuss symptoms and further trmt. Agrees to an appt Thlyla at 11am.

## 2017-10-03 NOTE — TELEPHONE ENCOUNTER
----- Message from Naomie Woodall sent at 10/3/2017 11:45 AM CDT -----  Wife (Heather Angel)requesting to speak with nurse concerning patient's previous colonoscopy and other questions/please call back at [phone] to advise.

## 2017-10-05 ENCOUNTER — TELEPHONE (OUTPATIENT)
Dept: GASTROENTEROLOGY | Facility: CLINIC | Age: 72
End: 2017-10-05

## 2017-10-05 ENCOUNTER — OFFICE VISIT (OUTPATIENT)
Dept: GASTROENTEROLOGY | Facility: CLINIC | Age: 72
End: 2017-10-05
Payer: MEDICARE

## 2017-10-05 VITALS
HEIGHT: 65 IN | BODY MASS INDEX: 21.09 KG/M2 | DIASTOLIC BLOOD PRESSURE: 70 MMHG | HEART RATE: 90 BPM | WEIGHT: 126.56 LBS | SYSTOLIC BLOOD PRESSURE: 140 MMHG

## 2017-10-05 DIAGNOSIS — R06.02 SHORTNESS OF BREATH: ICD-10-CM

## 2017-10-05 DIAGNOSIS — Z87.09 HISTORY OF COPD: ICD-10-CM

## 2017-10-05 DIAGNOSIS — Z99.81 ON SUPPLEMENTAL OXYGEN THERAPY: ICD-10-CM

## 2017-10-05 DIAGNOSIS — R14.0 ABDOMINAL BLOATING: Primary | ICD-10-CM

## 2017-10-05 DIAGNOSIS — R68.81 EARLY SATIETY: ICD-10-CM

## 2017-10-05 DIAGNOSIS — K29.70 GASTRITIS WITHOUT BLEEDING, UNSPECIFIED CHRONICITY, UNSPECIFIED GASTRITIS TYPE: ICD-10-CM

## 2017-10-05 PROCEDURE — 99999 PR PBB SHADOW E&M-EST. PATIENT-LVL III: CPT | Mod: PBBFAC,,, | Performed by: NURSE PRACTITIONER

## 2017-10-05 PROCEDURE — 99214 OFFICE O/P EST MOD 30 MIN: CPT | Mod: S$GLB,,, | Performed by: NURSE PRACTITIONER

## 2017-10-05 RX ORDER — DICYCLOMINE HYDROCHLORIDE 10 MG/1
10 CAPSULE ORAL
Qty: 120 CAPSULE | Refills: 0 | Status: SHIPPED | OUTPATIENT
Start: 2017-10-05 | End: 2017-10-05 | Stop reason: SDUPTHER

## 2017-10-05 RX ORDER — PANTOPRAZOLE SODIUM 40 MG/1
40 TABLET, DELAYED RELEASE ORAL DAILY
Qty: 30 TABLET | Refills: 6 | Status: SHIPPED | OUTPATIENT
Start: 2017-10-05 | End: 2017-12-11

## 2017-10-05 RX ORDER — PANTOPRAZOLE SODIUM 40 MG/1
40 TABLET, DELAYED RELEASE ORAL DAILY
Qty: 30 TABLET | Refills: 6 | Status: SHIPPED | OUTPATIENT
Start: 2017-10-05 | End: 2017-10-05 | Stop reason: SDUPTHER

## 2017-10-05 RX ORDER — DICYCLOMINE HYDROCHLORIDE 10 MG/1
10 CAPSULE ORAL
Qty: 120 CAPSULE | Refills: 0 | Status: SHIPPED | OUTPATIENT
Start: 2017-10-05 | End: 2018-10-05

## 2017-10-05 NOTE — PATIENT INSTRUCTIONS
Gastritis (Adult)    Gastritis is inflammation and irritation of the stomach lining. It can be present for a short time (acute) or be long lasting (chronic). Gastritis is often caused by infection with bacteria called H pylori. More than a third of people in the [place] have this bacteria in their bodies. In many cases, H pylori causes no problems or symptoms. In some people, though, the infection irritates the stomach lining and causes gastritis. Other causes of stomach irritation include drinking alcohol or taking pain-relieving medicines called NSAIDs (such as aspirin or ibuprofen).   Symptoms of gastritis can include:  · Abdominal pain or bloating  · Loss of appetite  · Nausea or vomiting  · Vomiting blood or having black stools  · Feeling more tired than usual  An inflamed and irritated stomach lining is more likely to develop a sore called an ulcer. To help prevent this, gastritis should be treated.  Home care  If needed, medicines may be prescribed. If you have H pylori infection, treating it will likely relieve your symptoms. Other changes can help reduce stomach irritation and help it heal.  · If you have been prescribed medicines for H pylori infection, take them as directed. Take all of the medicine until it is finished or your healthcare provider tells you to stop, even if you feel better.  · Your healthcare provider may recommend avoiding NSAIDs. If you take daily aspirin for your heart or other medical reasons, do not stop without talking to your healthcare provider first.  · Avoid drinking alcohol.  · Stop smoking. Smoking can irritate the stomach and delay healing. As much as possible, stay away from second hand smoke.  Follow-up care  Follow up with your healthcare provider, or as advised by our staff. Testing may be needed to check for inflammation or an ulcer.  When to seek medical advice  Call your healthcare provider for any of the following:  · Stomach pain that gets worse or moves to the lower  right abdomen (appendix area)  · Chest pain that appears or gets worse, or spreads to the back, neck, shoulder, or arm  · Frequent vomiting (cant keep down liquids)  · Blood in the stool or vomit (red or black in color)  · Feeling weak or dizzy  · Fever of 100.4ºF (38ºC) or higher, or as directed by your healthcare provider  Date Last Reviewed: 6/22/2015 © 2000-2017 [employer]. [address] [place] [address], [place] [address]. All rights reserved. This information is not intended as a substitute for professional medical care. Always follow your healthcare professional's instructions.        Excess Gas  Certain foods produce gas when digested. In some people, these foods make an excessive amount of gas. This may cause bloating, burping, or increased gas passing through the rectum (flatulence).     Foods that cause gas  The following foods are more likely to cause this problem. Limit them, or remove them from your diet:  · Broccoli  · Cauliflower  · [place] sprouts  · Cabbage  · Cooked dried beans  · Fizzy (carbonated) drinks, such as sparkling water, soda, beer, and champagne  Other causes  Other causes of excess gas include:  · Eating too fast or talking while you chew. This may cause you to swallow air. This increases the amount of gas in your stomach. And it may make your symptoms worse. Chew each mouthful completely before you swallow. Take your time.  · Chewing on gum or sucking on hard candy. These cause you to swallow more often. And some of what you are swallowing is air. This leads to more gas in your stomach. Avoid chewing gum and hard candy.  · Overeating. This may increase the feeling of being bloated and cause more gas. When you are full, stop eating.   · Being constipated. This can increase the amount of normal intestinal gas. Avoid constipation by getting more fiber in your diet. Good sources of fiber include whole-grain cereal, fresh vegetables (except those in the above list), and fresh  fruits. High-fiber foods absorb water and carry it out of the body. When adding more fiber to your diet, you also need to drink more water. You should drink at least 8, 8-ounce glasses of water (2 quarts) per day.  Date Last Reviewed: 8/1/2016  © 5287-3615 [employer]. [address] [place] [address], [place] [address]. All rights reserved. This information is not intended as a substitute for professional medical care. Always follow your healthcare professional's instructions.

## 2017-10-05 NOTE — PROGRESS NOTES
Subjective:       Patient ID: Francois Otero Sr. is a 72 y.o. male Body mass index is 21.06 kg/m².    Chief Complaint: Follow-up (discuss symptoms)    This is established with Dr. Yepez & myself.    GI Problem   Primary symptoms do not include fever, weight loss, fatigue, abdominal pain (pain has resolved; reports abdominal fullness/bloating remains; constant and is worse after he eats), nausea, vomiting, diarrhea, melena, hematemesis or hematochezia. Primary symptoms comment: CHIEF COMPLAINT:BLOATING. The illness began more than 7 days ago (started about a year ago with bloating and early satiety). The problem has not changed since onset.  The illness is also significant for bloating (CHIEF COMPLAINT: fullness to abdomen; improved when he wears his back brace). The illness does not include chills, anorexia, dysphagia, odynophagia or constipation. Associated symptoms comments: Bowel movements 1-2 daily of firm stool; Treatment: Miralax x 4 days without any change in bowel habits; took OTC dulcolax 7 tablets at one time and reports had a large bowel movement; took zantac daily x 1.5 weeks and stopped due to no relief; symptoms have remained; has not taken any medications for it for the past 2 weeks. Associated medical issues do not include inflammatory bowel disease, GERD, gallstones, liver disease, PUD or bowel resection. Associated medical issues comments: seen by general surgery and told he has no hernia.     Review of Systems   Constitutional: Negative for appetite change, chills, fatigue, fever, unexpected weight change and weight loss.   HENT: Negative for sore throat and trouble swallowing.    Respiratory: Positive for shortness of breath (history of COPD and reports his abdominal bloating makes him short of breath at times). Negative for cough and choking.    Cardiovascular: Negative for chest pain.   Gastrointestinal: Positive for bloating (CHIEF COMPLAINT: fullness to abdomen; improved when he wears his  back brace). Negative for abdominal pain (pain has resolved; reports abdominal fullness/bloating remains; constant and is worse after he eats), anal bleeding, anorexia, blood in stool, constipation, diarrhea, dysphagia, hematemesis, hematochezia, melena, nausea, rectal pain and vomiting.   Neurological: Negative for weakness.       Past Medical History:   Diagnosis Date    Colon polyp     COPD (chronic obstructive pulmonary disease)     Coronary artery disease     Hyperlipidemia     Hypertension     On home oxygen therapy     Tobacco abuse     1 PPD X 59 Yrs    Vertigo      Past Surgical History:   Procedure Laterality Date    by pass on leg       CARDIAC CATHETERIZATION      cardiac stents       COLONOSCOPY N/A 9/14/2017    Procedure: COLONOSCOPY;  Surgeon: Robb Yepez MD;  Location: [facility];  Service: Endoscopy;  Laterality: N/A; repeat in 5 years    UPPER GASTROINTESTINAL ENDOSCOPY  09/14/2017    Dr. Yepez     Family History   Problem Relation Age of Onset    Cancer Mother     Emphysema Mother     Colon cancer Mother      unsure of age of diagnosis    Heart disease Father     Crohn's disease Neg Hx     Ulcerative colitis Neg Hx     Stomach cancer Neg Hx     Esophageal cancer Neg Hx      Wt Readings from Last 10 Encounters:   10/05/17 57.4 kg (126 lb 8.7 oz)   10/02/17 58 kg (127 lb 13.9 oz)   09/14/17 55.7 kg (122 lb 12.7 oz)   08/21/17 57.3 kg (126 lb 5.2 oz)   08/10/17 57.6 kg (127 lb)   07/24/17 57.1 kg (125 lb 12.8 oz)   12/29/16 56 kg (123 lb 7.3 oz)   12/21/16 58.1 kg (128 lb)   05/25/16 56.7 kg (125 lb)   05/20/16 56.7 kg (125 lb)     Lab Results   Component Value Date    WBC 8.47 07/20/2017    HGB 14.5 07/20/2017    HCT 44.3 07/20/2017    MCV 94 07/20/2017     07/20/2017     CMP  Sodium   Date Value Ref Range Status   07/20/2017 142 136 - 145 mmol/L Final     Potassium   Date Value Ref Range Status   07/20/2017 4.2 3.5 - 5.1 mmol/L Final     Chloride   Date Value  Ref Range Status   07/20/2017 105 95 - 110 mmol/L Final     CO2   Date Value Ref Range Status   07/20/2017 26 22 - 31 mmol/L Final     Glucose   Date Value Ref Range Status   07/20/2017 100 70 - 110 mg/dL Final     Comment:     The ADA recommends the following guidelines for fasting glucose:  Normal:       less than 100 mg/dL  Prediabetes:  100 mg/dL to 125 mg/dL  Diabetes:     126 mg/dL or higher       BUN, Bld   Date Value Ref Range Status   07/20/2017 13 9 - 21 mg/dL Final     Creatinine   Date Value Ref Range Status   07/20/2017 0.84 0.50 - 1.40 mg/dL Final     Calcium   Date Value Ref Range Status   07/20/2017 9.8 8.4 - 10.2 mg/dL Final     Total Protein   Date Value Ref Range Status   12/29/2016 8.2 6.0 - 8.4 g/dL Final     Albumin   Date Value Ref Range Status   12/29/2016 4.4 3.5 - 5.2 g/dL Final     Total Bilirubin   Date Value Ref Range Status   12/29/2016 0.5 0.2 - 1.3 mg/dL Final     Comment:     For infants and newborns, interpretation of results should be based  on gestational age, weight and in agreement with clinical  observations.  Premature Infant recommended reference ranges:  Up to 24 hours.............<8.0 mg/dL  Up to 48 hours............<12.0 mg/dL  3-5 days..................<15.0 mg/dL  6-29 days.................<15.0 mg/dL       Alkaline Phosphatase   Date Value Ref Range Status   12/29/2016 77 38 - 145 U/L Final     AST   Date Value Ref Range Status   12/29/2016 30 17 - 59 U/L Final     ALT   Date Value Ref Range Status   12/29/2016 18 10 - 44 U/L Final     Anion Gap   Date Value Ref Range Status   07/20/2017 11 8 - 16 mmol/L Final     eGFR if    Date Value Ref Range Status   07/20/2017 >60 >60 mL/min/1.73 m^2 Final     eGFR if non    Date Value Ref Range Status   07/20/2017 >60 >60 mL/min/1.73 m^2 Final     Comment:     Calculation used to obtain the estimated glomerular filtration  rate (eGFR) is the CKD-EPI equation. Since race is unknown   in our  "information system, the eGFR values for   -American and Non--American patients are given   for each creatinine result.       Admission on 09/14/2017, Discharged on 09/14/2017   Component Date Value Ref Range Status    Helicobacter, Rapid Urea 09/15/2017 Negative   Final     Reviewed prior medical records including radiology report of 12/29/16 abdominal x-ray and ct abdomen pelvis; and 8/10/17 abdominal x-ray.    9/14/17 EGD was reviewed and procedure report states:   " Impression:           - Normal esophagus.                        - Gastritis. Biopsied.                        - Normal examined duodenum.  Recommendation:       - Await pathology and Bri test results.                        - Continue present medications.                        - Discharge patient to home (ambulatory). ".  Biopsy results:   ""1.  GASTRIC BIOPSIES:  - NORMAL ANTRAL MUCOSA.   - NO HELICOBACTER SEEN. "    9/14/17 Colonoscopy was reviewed and procedure report states:   " Impression:           - Two 5 to 6 mm polyps in the rectum and in the                         distal transverse colon, removed with a cold snare.                         Resected and retrieved.                        - External internal hemorrhoids.                        - The examination was otherwise normal.  Recommendation:       - Await pathology results.                        - Repeat colonoscopy in 5 years for surveillance                         based on pathology results.                        - Discharge patient to home (ambulatory). ".  Biopsy results:   "2.  TRANSVERSE COLON POLYP BIOPSY:  - FRAGMENT OF TUBULAR ADENOMA.     3.  RECTAL POLYP, POLYPECTOMY:  - HYPERPLASTIC POLYP. "  Objective:      Physical Exam   Constitutional: He is oriented to person, place, and time. He appears well-developed and well-nourished. No distress.   HENT:   Mouth/Throat: Oropharynx is clear and moist and mucous membranes are normal. No oral lesions. No " oropharyngeal exudate.   Eyes: Conjunctivae are normal. No scleral icterus.   Cardiovascular: Normal rate.    Pulmonary/Chest: Effort normal and breath sounds normal. No respiratory distress.   Abdominal: Soft. Normal appearance and bowel sounds are normal. He exhibits no distension, no abdominal bruit and no mass. There is no hepatosplenomegaly. There is no tenderness. There is no rigidity, no rebound, no guarding, no tenderness at McBurney's point and negative Tian's sign. No hernia.   Neurological: He is alert and oriented to person, place, and time.   Skin: Skin is warm and dry. No rash noted. He is not diaphoretic. No erythema. No pallor.   Non-jaundiced   Psychiatric: He has a normal mood and affect. His behavior is normal.   Nursing note and vitals reviewed.      Assessment:       1. Abdominal bloating    2. Gastritis without bleeding, unspecified chronicity, unspecified gastritis type    3. Early satiety    4. Shortness of breath    5. History of COPD    6. On supplemental oxygen therapy        Plan:       Gastritis without bleeding, unspecified chronicity, unspecified gastritis type  -discontinue zantac due to ineffective therapy  - START    pantoprazole (PROTONIX) 40 MG tablet; Take 1 tablet (40 mg total) by mouth once daily. Before breakfast  Dispense: 30 tablet; Refill: 6, - take in the morning before breakfast, discussed about possible long term use of medication (prefer to use lowest effective dose or discontinuing if possible), pt verbalized understanding  -discussed about the different types of medications used to treat reflux and how to use them, antacids can be used PRN for breakthrough heartburn symptoms by reducing stomach acid that is already produced, H2 blockers (zantac) work by limiting the amount acid production, & PPI's work to block acid production and are taken daily, patient verbalized understanding.  -Educated patient on lifestyle modifications to help control/reduce gastritis  including: avoid large meals, avoid eating within 2-3 hours of bedtime (avoid late night eating & lying down soon after eating), elevate head of bed if nocturnal symptoms are present, smoking cessation (if current smoker), & weight loss (if overweight).   -Educated to avoid known foods which trigger symptoms & to minimize/avoid high-fat foods, chocolate, caffeine, citrus, alcohol, & tomato products.  -Advised to avoid/limit use of NSAID's, since they can cause GI upset, bleeding, and/or ulcers. If needed, take with food.  - possible course of carafate if symptoms persist    Abdominal bloating  -  START   dicyclomine (BENTYL) 10 MG capsule; Take 1 capsule (10 mg total) by mouth before meals and at bedtime as needed.  Dispense: 120 capsule; Refill: 0  - recommended OTC simethicone as directed, such as Phazyme or Gas-x  -reviewed with patient about low gas diet: Reduce or eliminate these foods from your diet: Broccoli, Cauliflower, [place] sprouts, Cabbage, Cooked dried beans, Carbonated beverages (sparkling water, soda, beer, champagne)  Other Causes Of Excess Gas Include:   1) EATING TOO FAST or TALKING WHILE YOU CHEW may cause you to swallow air. This increases the amount of gas in the stomach and may worsen your symptoms.  --> Chew each mouthful completely before swallowing. Take your time.  2) OVEREATING may increase the feeling of being bloated and cause more gas.  --> When you are full, stop eating.  3) CONSTIPATION can increase the amount of normal intestinal gas.  --> Avoid constipation by increasing the amount of fiber in your diet by including whole cereal grains, fresh vegetables (except those in the above list) and fresh fruits. High-fiber foods absorb water and carry it out of the body. When increasing the amount of fiber in your diet, you also need to increase the amount of water that you drink. You should drink at least eight 8-ounce glasses of water (two quarts) per day.    Short of breath, On  supplemental oxygen therapy & History of COPD  - patient not wearing currently, wears it only prn  Recommend follow-up with Primary Care Provider/pulmonology for continued evaluation and management.    Early satiety  - avoid large meals, avoid eating within 2-3 hours of bedtime (avoid late night eating & lying down soon after eating), elevate head of bed if nocturnal symptoms are present, smoking cessation (if current smoker), & weight loss (if overweight).   - minimize/avoid high-fat foods, chocolate, caffeine, citrus, alcohol, & tomato products.  -avoid/limit use of NSAID's, since they can cause GI upset, bleeding, and/or ulcers. If needed, take with food.  - possible gastric emptying study if symptoms persist    Return in about 1 month (around 11/5/2017), or if symptoms worsen or fail to improve, for follow-up with Dr. Yepez.    If no improvement in symptoms or symptoms worsen, call/follow-up at clinic or go to ER.

## 2017-10-06 ENCOUNTER — TELEPHONE (OUTPATIENT)
Dept: GASTROENTEROLOGY | Facility: CLINIC | Age: 72
End: 2017-10-06

## 2017-10-06 NOTE — TELEPHONE ENCOUNTER
----- Message from Naomie Woodall sent at 10/6/2017  8:06 AM CDT -----  Wife (Jayleen)requesting to speak with nurse concerning patient's medication/please call back at [phone].

## 2017-10-08 ENCOUNTER — OFFICE VISIT (OUTPATIENT)
Dept: URGENT CARE | Facility: CLINIC | Age: 72
End: 2017-10-08
Payer: MEDICARE

## 2017-10-08 VITALS
DIASTOLIC BLOOD PRESSURE: 69 MMHG | TEMPERATURE: 102 F | HEIGHT: 62 IN | HEART RATE: 100 BPM | WEIGHT: 124 LBS | RESPIRATION RATE: 20 BRPM | OXYGEN SATURATION: 94 % | SYSTOLIC BLOOD PRESSURE: 155 MMHG | BODY MASS INDEX: 22.82 KG/M2

## 2017-10-08 DIAGNOSIS — J10.1 INFLUENZA A: Primary | ICD-10-CM

## 2017-10-08 DIAGNOSIS — J44.9 CHRONIC OBSTRUCTIVE PULMONARY DISEASE, UNSPECIFIED COPD TYPE: ICD-10-CM

## 2017-10-08 DIAGNOSIS — R03.0 ELEVATED BLOOD PRESSURE READING: ICD-10-CM

## 2017-10-08 LAB
CTP QC/QA: YES
FLUAV AG NPH QL: POSITIVE
FLUBV AG NPH QL: NEGATIVE

## 2017-10-08 PROCEDURE — 87804 INFLUENZA ASSAY W/OPTIC: CPT | Mod: QW,S$GLB,, | Performed by: FAMILY MEDICINE

## 2017-10-08 PROCEDURE — 99213 OFFICE O/P EST LOW 20 MIN: CPT | Mod: S$GLB,,, | Performed by: FAMILY MEDICINE

## 2017-10-08 RX ORDER — OSELTAMIVIR PHOSPHATE 75 MG/1
75 CAPSULE ORAL 2 TIMES DAILY
Qty: 10 CAPSULE | Refills: 0 | Status: SHIPPED | OUTPATIENT
Start: 2017-10-08 | End: 2017-10-13

## 2017-10-08 RX ORDER — OSELTAMIVIR PHOSPHATE 75 MG/1
75 CAPSULE ORAL 2 TIMES DAILY
Qty: 10 CAPSULE | Refills: 0 | Status: SHIPPED | OUTPATIENT
Start: 2017-10-08 | End: 2017-10-08 | Stop reason: SDUPTHER

## 2017-10-08 NOTE — PATIENT INSTRUCTIONS

## 2017-10-08 NOTE — PROGRESS NOTES
"Subjective:       Patient ID: Francois Otero Sr. is a 72 y.o. male.    Vitals:  height is 5' 2" (1.575 m) and weight is 56.2 kg (124 lb). His oral temperature is 102.1 °F (38.9 °C) (abnormal). His blood pressure is 155/69 (abnormal) and his pulse is 100. His respiration is 20 and oxygen saturation is 94% (abnormal).     Chief Complaint: Extremity Weakness (Patient states he started having symptoms yesterday. As well as some body aches. ); Shortness of Breath (Patient states he's having a harder time breathing than usual. History of COPD. ); and Cough (Patient complains of mild cough.)    Extremity Weakness    This is a new problem. The current episode started yesterday. The problem occurs intermittently. The problem has been gradually worsening. Associated symptoms include a fever. The treatment provided no relief.   Shortness of Breath   This is a chronic problem. Associated symptoms include a fever, headaches and a sore throat. Pertinent negatives include no abdominal pain, chest pain, ear pain, leg swelling, sputum production or wheezing. His past medical history is significant for COPD.   Cough   This is a new problem. The current episode started yesterday. The problem has been unchanged. The cough is non-productive. Associated symptoms include a fever, headaches, postnasal drip, a sore throat and shortness of breath. Pertinent negatives include no chest pain, chills, ear pain, eye redness, myalgias or wheezing. His past medical history is significant for COPD.     Review of Systems   Constitution: Positive for fever, weakness and malaise/fatigue. Negative for chills.   HENT: Positive for postnasal drip and sore throat. Negative for congestion, ear pain and hoarse voice.    Eyes: Negative for discharge and redness.   Cardiovascular: Negative for chest pain, dyspnea on exertion and leg swelling.   Respiratory: Positive for cough and shortness of breath. Negative for sputum production and wheezing.  "   Musculoskeletal: Positive for extremity weakness. Negative for myalgias.   Gastrointestinal: Negative for abdominal pain and nausea.   Neurological: Positive for headaches. Negative for light-headedness.       Objective:      Physical Exam   Constitutional: He appears well-developed and well-nourished. He is cooperative.  Non-toxic appearance. He does not appear ill. No distress.   HENT:   Head: Normocephalic and atraumatic.   Right Ear: Hearing, tympanic membrane, external ear and ear canal normal.   Left Ear: Hearing, tympanic membrane, external ear and ear canal normal.   Nose: Nose normal. No mucosal edema, rhinorrhea or nasal deformity. No epistaxis. Right sinus exhibits no maxillary sinus tenderness and no frontal sinus tenderness. Left sinus exhibits no maxillary sinus tenderness and no frontal sinus tenderness.   Mouth/Throat: Uvula is midline, oropharynx is clear and moist and mucous membranes are normal. No trismus in the jaw. Normal dentition. No uvula swelling. No posterior oropharyngeal erythema.   Eyes: Conjunctivae and lids are normal. No scleral icterus.   Sclera clear bilat   Neck: Trachea normal, full passive range of motion without pain and phonation normal. Neck supple.   Cardiovascular: Normal rate, regular rhythm, normal heart sounds, intact distal pulses and normal pulses.    Pulmonary/Chest: Effort normal. No respiratory distress. He has no decreased breath sounds. He has wheezes ((mild)) in the right upper field and the left upper field.   Abdominal: Soft. Normal appearance and bowel sounds are normal. He exhibits no distension. There is no tenderness.   Neurological: He is alert.   Skin: Skin is intact. He is not diaphoretic.   Psychiatric: He has a normal mood and affect. His speech is normal and behavior is normal. Cognition and memory are normal.   Nursing note and vitals reviewed.      Assessment:       1. Influenza A    2. Chronic obstructive pulmonary disease, unspecified COPD type     3. Elevated blood pressure reading        Plan:         Influenza A  -     POCT Influenza A/B  -     oseltamivir (TAMIFLU) 75 MG capsule; Take 1 capsule (75 mg total) by mouth 2 (two) times daily.  Dispense: 10 capsule; Refill: 0  -     Tylenol PRN     Chronic obstructive pulmonary disease, unspecified COPD type         -     NEB Tx q6 PRN    Elevated blood pressure reading         -     F/U with PCP for BP re-check

## 2017-10-09 ENCOUNTER — TELEPHONE (OUTPATIENT)
Dept: GASTROENTEROLOGY | Facility: CLINIC | Age: 72
End: 2017-10-09

## 2017-10-09 NOTE — TELEPHONE ENCOUNTER
Pt notified Protonix is to be taken daily before breakfast, Bentyl as needed for discomfort, preferably before a meal. Verbalizes understanding.

## 2017-10-09 NOTE — PROGRESS NOTES
"Subjective:       Patient ID: Francois Otero Sr. is a 72 y.o. male.    Chief Complaint: Consult (possible hernia)    HPI 72-year-old male presents with the complaint of "bloating".  He wanted to make sure he does not have a hernia.  He has not seen any bulging.  He is not having any nausea or vomiting.  He has not had any obstructive symptoms.  He has normal bowel movements.    Review of Systems   Constitutional: Negative for chills, fatigue, fever and unexpected weight change.   HENT: Negative for congestion, sore throat, trouble swallowing and voice change.    Eyes: Negative for redness and visual disturbance.   Respiratory: Negative for cough, shortness of breath and wheezing.    Cardiovascular: Negative for chest pain and palpitations.   Gastrointestinal: Positive for abdominal pain. Negative for blood in stool, nausea and vomiting.   Genitourinary: Negative for dysuria, frequency, hematuria and urgency.   Musculoskeletal: Negative for arthralgias, myalgias and neck pain.   Skin: Negative for rash and wound.   Allergic/Immunologic: Negative.    Neurological: Negative for tremors, seizures, weakness and headaches.   Hematological: Does not bruise/bleed easily.   Psychiatric/Behavioral: Negative for confusion and dysphoric mood. The patient is not nervous/anxious.      Objective:     Physical Exam   Constitutional: He is oriented to person, place, and time. He appears well-developed and well-nourished. No distress.   HENT:   Head: Normocephalic and atraumatic.   Mouth/Throat: Oropharynx is clear and moist. No oropharyngeal exudate.   Eyes: Conjunctivae and EOM are normal. Pupils are equal, round, and reactive to light. No scleral icterus.   Neck: Normal range of motion. Neck supple. No thyromegaly present.   Cardiovascular: Normal rate, regular rhythm, normal heart sounds and intact distal pulses.    No murmur heard.  Pulmonary/Chest: Effort normal and breath sounds normal. No respiratory distress. He has no " wheezes. He has no rales.   Abdominal: Soft. Bowel sounds are normal. He exhibits distension (very mild distention). There is no tenderness. No hernia.   Musculoskeletal: Normal range of motion. He exhibits no edema or tenderness.   Lymphadenopathy:     He has no cervical adenopathy.   Neurological: He is alert and oriented to person, place, and time. No cranial nerve deficit.   Skin: Skin is warm and dry. No rash noted. No erythema.   Psychiatric: He has a normal mood and affect. His behavior is normal. Judgment normal.     Assessment:     Encounter Diagnosis   Name Primary?    Abdominal pain, unspecified abdominal location Yes       Plan:      1. No hernia is present on exam.  2. Pt will follow up as needed.

## 2017-12-19 ENCOUNTER — OFFICE VISIT (OUTPATIENT)
Dept: GASTROENTEROLOGY | Facility: CLINIC | Age: 72
End: 2017-12-19
Payer: MEDICARE

## 2017-12-19 VITALS — BODY MASS INDEX: 23 KG/M2 | WEIGHT: 125 LBS | HEIGHT: 62 IN

## 2017-12-19 DIAGNOSIS — R10.84 GENERALIZED ABDOMINAL PAIN: Primary | ICD-10-CM

## 2017-12-19 DIAGNOSIS — R14.0 ABDOMINAL BLOATING: ICD-10-CM

## 2017-12-19 PROCEDURE — 99213 OFFICE O/P EST LOW 20 MIN: CPT | Mod: S$GLB,,, | Performed by: INTERNAL MEDICINE

## 2017-12-19 PROCEDURE — 99999 PR PBB SHADOW E&M-EST. PATIENT-LVL III: CPT | Mod: PBBFAC,,, | Performed by: INTERNAL MEDICINE

## 2017-12-19 NOTE — PROGRESS NOTES
"Pt returns re: abd bloating. Symptom chronic, dating back several years. Vague sensation of abd "fullness", no discreet pain. No vomiting. No fever or jaundice. No bleeding. No weight loss. Denies change bowel habits. Recent EGD and C-scope noted.     REVIEW OF SYSTEMS:   Constitutional: Negative for fever, appetite change and unexpected weight change.  HENT: Negative for sore throat and trouble swallowing.  Eyes: Negative for visual disturbance.  Respiratory: Negative for chest tightness, shortness of breath and wheezing.  Cardiovascular: Negative for chest pain.  Gastrointestinal:  as per HPI    PHYSICAL EXAMINATION:                                                        GENERAL:  Comfortable, in no acute distress.      SKIN: Non-jaundiced.                             HEENT EXAM:  Nonicteric.  No adenopathy.  Oropharynx is clear.               NECK:  Supple.                                                               LUNGS:  Clear.                                                               CARDIAC:  Regular rate and rhythm.  S1, S2.  No murmur.                      ABDOMEN:  Soft, positive bowel sounds, nontender.  No hepatosplenomegaly or masses.  No rebound or guarding.                                             EXTREMITIES:  No edema.       IMP: Abd bloating (chronic) - failed OTC simethicone products, as well as anti-spasm med (bentyl).    REC: Abd U/S.  "

## 2017-12-29 ENCOUNTER — HOSPITAL ENCOUNTER (OUTPATIENT)
Dept: RADIOLOGY | Facility: HOSPITAL | Age: 72
Discharge: HOME | End: 2017-12-29
Attending: INTERNAL MEDICINE
Payer: MEDICARE

## 2017-12-29 DIAGNOSIS — R10.84 GENERALIZED ABDOMINAL PAIN: ICD-10-CM

## 2017-12-29 DIAGNOSIS — K21.9 GASTROESOPHAGEAL REFLUX DISEASE, ESOPHAGITIS PRESENCE NOT SPECIFIED: Primary | ICD-10-CM

## 2017-12-29 PROCEDURE — 76700 US EXAM ABDOM COMPLETE: CPT | Mod: TC,PO

## 2017-12-29 PROCEDURE — 76700 US EXAM ABDOM COMPLETE: CPT | Mod: 26,,, | Performed by: RADIOLOGY

## 2018-01-23 PROBLEM — J10.1 INFLUENZA A: Status: RESOLVED | Noted: 2017-10-08 | Resolved: 2018-01-23

## 2018-11-02 ENCOUNTER — HOSPITAL ENCOUNTER (OUTPATIENT)
Dept: RADIOLOGY | Facility: HOSPITAL | Age: 73
Discharge: HOME | End: 2018-11-02
Attending: INTERNAL MEDICINE
Payer: MEDICARE

## 2018-11-02 PROCEDURE — 25500020 PHARM REV CODE 255: Mod: PO | Performed by: INTERNAL MEDICINE

## 2018-11-02 PROCEDURE — 74178 CT ABD&PLV WO CNTR FLWD CNTR: CPT | Mod: TC,PO

## 2018-11-02 PROCEDURE — 74178 CT ABD&PLV WO CNTR FLWD CNTR: CPT | Mod: 26,,, | Performed by: RADIOLOGY

## 2018-11-02 RX ADMIN — IOHEXOL 30 ML: 350 INJECTION, SOLUTION INTRAVENOUS at 09:11

## 2018-11-02 RX ADMIN — IOHEXOL 75 ML: 350 INJECTION, SOLUTION INTRAVENOUS at 09:11

## 2019-09-13 ENCOUNTER — TELEPHONE (OUTPATIENT)
Dept: GASTROENTEROLOGY | Facility: CLINIC | Age: 74
End: 2019-09-13

## 2019-09-13 NOTE — TELEPHONE ENCOUNTER
----- Message from Sarah Guidry sent at 9/13/2019  3:02 PM CDT -----  Contact: Wife  Type: Needs Medical Advice    Who Called:  Anay Friedman Call Back Number: [phone]  Additional Information: Patient wife would like a call back in regards to  When the patient had his last colonoscopy please call back to advise

## 2019-09-17 ENCOUNTER — TELEPHONE (OUTPATIENT)
Dept: GASTROENTEROLOGY | Facility: CLINIC | Age: 74
End: 2019-09-17

## 2019-09-17 NOTE — TELEPHONE ENCOUNTER
Spoke w/ wife in regards to scheduling procedure but she informed me that they had made an appt somewhere else.

## 2019-09-17 NOTE — TELEPHONE ENCOUNTER
----- Message from Gaetano Kelly sent at 9/16/2019 11:11 AM CDT -----  Contact: Wife/Alex Johnson called in regarding the attached patient () and stated she is trying to get patient scheduled for a colonoscopy.  Alex stated his last one was in 2017 at [facility] and patient is still having pains in his stomach?    Alex's call back number is [phone]

## 2019-11-21 PROBLEM — E03.4 HYPOTHYROIDISM DUE TO ACQUIRED ATROPHY OF THYROID: Status: ACTIVE | Noted: 2019-11-21

## 2020-08-24 ENCOUNTER — OFFICE VISIT (OUTPATIENT)
Dept: OPTOMETRY | Facility: CLINIC | Age: 75
End: 2020-08-24
Payer: MEDICARE

## 2020-08-24 DIAGNOSIS — H25.13 NUCLEAR SCLEROSIS, BILATERAL: Primary | ICD-10-CM

## 2020-08-24 DIAGNOSIS — H52.4 MYOPIA WITH PRESBYOPIA OF BOTH EYES: ICD-10-CM

## 2020-08-24 DIAGNOSIS — H43.393 VITREOUS FLOATERS, BILATERAL: ICD-10-CM

## 2020-08-24 DIAGNOSIS — H52.13 MYOPIA WITH PRESBYOPIA OF BOTH EYES: ICD-10-CM

## 2020-08-24 DIAGNOSIS — Z13.5 GLAUCOMA SCREENING: ICD-10-CM

## 2020-08-24 PROCEDURE — 92015 PR REFRACTION: ICD-10-PCS | Mod: S$GLB,,, | Performed by: OPTOMETRIST

## 2020-08-24 PROCEDURE — 92004 PR EYE EXAM, NEW PATIENT,COMPREHESV: ICD-10-PCS | Mod: S$GLB,,, | Performed by: OPTOMETRIST

## 2020-08-24 PROCEDURE — 99999 PR PBB SHADOW E&M-EST. PATIENT-LVL III: CPT | Mod: PBBFAC,,, | Performed by: OPTOMETRIST

## 2020-08-24 PROCEDURE — 92004 COMPRE OPH EXAM NEW PT 1/>: CPT | Mod: S$GLB,,, | Performed by: OPTOMETRIST

## 2020-08-24 PROCEDURE — 99999 PR PBB SHADOW E&M-EST. PATIENT-LVL III: ICD-10-PCS | Mod: PBBFAC,,, | Performed by: OPTOMETRIST

## 2020-08-24 PROCEDURE — 92015 DETERMINE REFRACTIVE STATE: CPT | Mod: S$GLB,,, | Performed by: OPTOMETRIST

## 2020-08-24 NOTE — PROGRESS NOTES
HPI     Concerns About Ocular Health      Additional comments: Ocular health exam              Comments     DLE: x 2010    Pt states decrease in dist and near x 2 months. Reads better without gls.   Has had ocular migraines in past.           Last edited by Cheryle Quintana on 8/24/2020 10:09 AM. (History)        ROS     Positive for: Eyes    Negative for: Constitutional, Gastrointestinal, Neurological, Skin,   Genitourinary, Musculoskeletal, HENT, Endocrine, Cardiovascular,   Respiratory, Psychiatric, Allergic/Imm, Heme/Lymph    Last edited by DES Avendano, OD on 8/24/2020 10:30 AM. (History)        Assessment /Plan     For exam results, see Encounter Report.    Nuclear sclerosis, bilateral    Vitreous floaters, bilateral    Glaucoma screening    Myopia with presbyopia of both eyes      1. Vis sig w/ myopic shift OU, not ready for consult   Get specs for driving  2. RD precautions given and reviewed. Patient knows to call/ message if any further changes in symptoms occur.  3. Not suspect, but monitor with crowded disc    4. Updated specs for distance     Discussed and educated patient on current findings /plan.  RTC 1 year, prn if any changes / issues

## 2020-08-24 NOTE — PATIENT INSTRUCTIONS
"DRY EYES -- BURNING OR EMERALD SYMPTOMS:  Use Over The Counter artificial tears as needed for dry eye symptoms.   Some common brands include:  Systane, Optive, Refresh, and Thera-Tears.  These drops can be used as frequently as desired, but may be most helpful use during long periods of concentrated work.  For example, reading / working at the computer. Start with 3-4x per day.     Nighttime Ophthalmic gel or ointments are available: Refresh PM, Genteal, and Lacrilube.    Avoid drops that "get redness out" (Visine, Murine, Clear Eyes), as these may contain medication that could further irritate the eyes, especially with chronic use.    ALLERGY EYES -- ITCHING SYMPTOMS:  Over the counter medications include--Pataday, Zaditor, and Alaway.  Use as directed 1-2 drops daily for symptoms of itching / watering eyes.  These drops will not help for dry eye or exposure symptoms.    REDNESS RELIEF:  Lumify---is a good redness reliever that will not cause irritation if used chronically.         FLASHES / FLOATERS / POSTERIOR VITREOUS DETACHMENT    Call the clinic if you have any further changes in symptoms.  Including:  Increased numbers of floaters or flashing lights, dimness or darkness that moves through or stays constant in your vision, or any pain in the eye (s).    You may sometimes see small specks or clouds moving in your field of vision.  They are called FLOATERS.  You can often see them when looking at a plain background, like a blank wall or blue catalina.  Floaters are actually tiny clumps of gel or cells inside the VITREOUS, the clear jelly-like fluid that fills the inside of your eye.    While these objects look like they are in front of your eye, they are actually floating inside.  What you see are the shadows they cast on the RETINA, the nerve layer at the back of the eye that senses light and allows you to see.      POSTERIOR VITREOUS DETACHMENT    The appearance of new floaters may be alarming.  If you suddenly " develop new floaters, you should contact your eye care professional  right away.    The retina can tear if the shrinking vitreous pulls away from the wall of the eye.  This sometimes causes a small amount of bleeding in the eye that may appear as new floaters.    A torn retina is always a serious problem, since it can lead to a retinal detachment.  You should see your eye care professional as soon as possible if:     even one new floater appears suddenly;   you see sudden flashes of light;   you notice other symptoms, like the loss of side vision, or a curtain closes down in your vision        POSTERIOR VITREOUS DETACHMENT is more common for people who:     are nearsighted;   have had cataract surgery;   have had YAG laser surgery of the eye;   have had inflammation inside the eye;   are over age 60.      While some floaters may remain visible, many of them will fade over time and become less noticeable.  Even if you've had some floaters for years, you should have your eyes checked as soon as possible if you notice new ones.    FLASHING LIGHTS    When the vitreous gel rubs or pulls on the retina, you may see what look like flashing lights or lightning streaks.  These flashes can appear off and on for several weeks or months.      Some people experience flashes of light that appear as jagged lines or heat waves in both eyes, lasting 10-20 minutes.  These flashes are caused by a spasm of blood vessels in the brain, which is called a migraine.    If a headache follows these flashes, it's called a migraine headache.  If   no headache occurs, these flashes are called Ophthalmic or Ocular Migraine.            Early Cataracts--not visually significant for surgery consultation.    What Are Cataracts?  A clear lens in the eye focuses light. This lets the eye see images sharply. With age, the lens slowly becomes cloudy. The cloudy lens is a cataract. A cataract scatters light and makes it hard for the eye to  focus. Cataracts often form in both eyes. But one lens may cloud faster than the other.      The Aging of Your Lens    Your lens may cloud so slowly that you don`t notice any vision changes at first. But as the cataract gets worse, the eye has a harder time focusing. In early stages, glasses may help you see better. As the lens gets cloudier, your doctor may recommend surgery to restore your vision.

## 2020-12-03 PROBLEM — Z71.89 ADVANCE CARE PLANNING: Status: ACTIVE | Noted: 2020-12-03

## 2020-12-03 PROBLEM — E87.1 HYPONATREMIA: Status: ACTIVE | Noted: 2020-12-03

## 2020-12-03 PROBLEM — U07.1 COVID-19 VIRUS DETECTED: Status: ACTIVE | Noted: 2020-12-03

## 2020-12-03 PROBLEM — U07.1 PNEUMONIA DUE TO COVID-19 VIRUS: Status: ACTIVE | Noted: 2020-12-03

## 2020-12-03 PROBLEM — J96.12 CHRONIC HYPERCAPNIC RESPIRATORY FAILURE: Status: ACTIVE | Noted: 2020-12-03

## 2020-12-03 PROBLEM — N17.9 ACUTE RENAL FAILURE: Status: ACTIVE | Noted: 2020-12-03

## 2020-12-03 PROBLEM — R73.9 HYPERGLYCEMIA: Status: ACTIVE | Noted: 2020-12-03

## 2020-12-03 PROBLEM — J12.82 PNEUMONIA DUE TO COVID-19 VIRUS: Status: ACTIVE | Noted: 2020-12-03

## 2021-02-02 ENCOUNTER — CLINICAL SUPPORT (OUTPATIENT)
Dept: URGENT CARE | Facility: CLINIC | Age: 76
End: 2021-02-02
Payer: MEDICARE

## 2021-02-02 DIAGNOSIS — Z20.822 ENCOUNTER FOR LABORATORY TESTING FOR COVID-19 VIRUS: Primary | ICD-10-CM

## 2021-02-02 LAB
CTP QC/QA: YES
SARS-COV-2 RDRP RESP QL NAA+PROBE: NEGATIVE

## 2021-02-02 PROCEDURE — U0002: ICD-10-PCS | Mod: QW,S$GLB,, | Performed by: FAMILY MEDICINE

## 2021-02-02 PROCEDURE — U0002 COVID-19 LAB TEST NON-CDC: HCPCS | Mod: QW,S$GLB,, | Performed by: FAMILY MEDICINE

## 2021-03-04 PROBLEM — R43.2 ABNORMAL SENSE OF TASTE: Status: ACTIVE | Noted: 2021-03-04

## 2021-07-27 ENCOUNTER — OFFICE VISIT (OUTPATIENT)
Dept: FAMILY MEDICINE | Facility: CLINIC | Age: 76
End: 2021-07-27
Payer: MEDICARE

## 2021-07-27 VITALS — TEMPERATURE: 98 F | HEART RATE: 76 BPM | OXYGEN SATURATION: 97 %

## 2021-07-27 DIAGNOSIS — Z20.822 SUSPECTED COVID-19 VIRUS INFECTION: Primary | ICD-10-CM

## 2021-07-27 PROCEDURE — 99999 PR PBB SHADOW E&M-EST. PATIENT-LVL II: CPT | Mod: PBBFAC,,, | Performed by: INTERNAL MEDICINE

## 2021-07-27 PROCEDURE — 1159F MED LIST DOCD IN RCRD: CPT | Mod: CPTII,S$GLB,, | Performed by: INTERNAL MEDICINE

## 2021-07-27 PROCEDURE — U0005 INFEC AGEN DETEC AMPLI PROBE: HCPCS | Performed by: INTERNAL MEDICINE

## 2021-07-27 PROCEDURE — 1160F PR REVIEW ALL MEDS BY PRESCRIBER/CLIN PHARMACIST DOCUMENTED: ICD-10-PCS | Mod: CPTII,S$GLB,, | Performed by: INTERNAL MEDICINE

## 2021-07-27 PROCEDURE — U0003 INFECTIOUS AGENT DETECTION BY NUCLEIC ACID (DNA OR RNA); SEVERE ACUTE RESPIRATORY SYNDROME CORONAVIRUS 2 (SARS-COV-2) (CORONAVIRUS DISEASE [COVID-19]), AMPLIFIED PROBE TECHNIQUE, MAKING USE OF HIGH THROUGHPUT TECHNOLOGIES AS DESCRIBED BY CMS-2020-01-R: HCPCS | Performed by: INTERNAL MEDICINE

## 2021-07-27 PROCEDURE — 99999 PR PBB SHADOW E&M-EST. PATIENT-LVL II: ICD-10-PCS | Mod: PBBFAC,,, | Performed by: INTERNAL MEDICINE

## 2021-07-27 PROCEDURE — 99203 OFFICE O/P NEW LOW 30 MIN: CPT | Mod: S$GLB,,, | Performed by: INTERNAL MEDICINE

## 2021-07-27 PROCEDURE — 1160F RVW MEDS BY RX/DR IN RCRD: CPT | Mod: CPTII,S$GLB,, | Performed by: INTERNAL MEDICINE

## 2021-07-27 PROCEDURE — 99203 PR OFFICE/OUTPT VISIT, NEW, LEVL III, 30-44 MIN: ICD-10-PCS | Mod: S$GLB,,, | Performed by: INTERNAL MEDICINE

## 2021-07-27 PROCEDURE — 1159F PR MEDICATION LIST DOCUMENTED IN MEDICAL RECORD: ICD-10-PCS | Mod: CPTII,S$GLB,, | Performed by: INTERNAL MEDICINE

## 2021-07-28 LAB
SARS-COV-2 RNA RESP QL NAA+PROBE: NOT DETECTED
SARS-COV-2- CYCLE NUMBER: -1

## 2022-03-21 DIAGNOSIS — H91.90 HEARING DIFFICULTY, UNSPECIFIED LATERALITY: Primary | ICD-10-CM

## 2023-01-10 ENCOUNTER — TELEPHONE (OUTPATIENT)
Dept: RADIOLOGY | Facility: HOSPITAL | Age: 78
End: 2023-01-10
Payer: MEDICARE

## 2023-01-10 NOTE — TELEPHONE ENCOUNTER
----- Message from Diana Carrasquillo MA sent at 1/9/2023  1:29 PM CST -----  Regarding: PET SCAN  Our providers are ordering a PET Scan for this patient. Please call this patient and schedule. Thank you so much.

## 2023-01-12 ENCOUNTER — TELEPHONE (OUTPATIENT)
Dept: HEMATOLOGY/ONCOLOGY | Facility: CLINIC | Age: 78
End: 2023-01-12
Payer: MEDICARE

## 2023-01-12 NOTE — NURSING
Spoke with patient regarding referral. Patient has requested to wait until PET is reviewed by EMIL Martinez on 1/25.  If at that time he needs to see us he will then schedule.  Agreed to wait and verbalized understanding.  Will follow

## 2023-01-13 ENCOUNTER — HOSPITAL ENCOUNTER (OUTPATIENT)
Dept: RADIOLOGY | Facility: HOSPITAL | Age: 78
Discharge: HOME | End: 2023-01-13
Attending: NURSE PRACTITIONER
Payer: MEDICARE

## 2023-01-13 DIAGNOSIS — R91.1 PULMONARY NODULE 1 CM OR GREATER IN DIAMETER: ICD-10-CM

## 2023-01-13 LAB — GLUCOSE SERPL-MCNC: 91 MG/DL (ref 70–110)

## 2023-01-13 PROCEDURE — 78815 PET IMAGE W/CT SKULL-THIGH: CPT | Mod: 26,PI,, | Performed by: RADIOLOGY

## 2023-01-13 PROCEDURE — 78815 NM PET CT ROUTINE: ICD-10-PCS | Mod: 26,PI,, | Performed by: RADIOLOGY

## 2023-01-13 PROCEDURE — A9552 F18 FDG: HCPCS | Mod: PN

## 2023-01-13 NOTE — PROGRESS NOTES
PET Imaging Questionnaire    Are you a Diabetic? Recent Blood Sugar level? No    Are you anemic? Bone Marrow Stimulation Meds? No    Have you had a CT Scan, if so when & where was your last one? Yes -     Have you had a PET Scan, if so when & where was your last one? No    Chemotherapy or currently on Chemotherapy? No    Radiation therapy? No    Surgical History:   Past Surgical History:   Procedure Laterality Date    by pass on leg       CARDIAC CATHETERIZATION      cardiac stents       COLONOSCOPY N/A 9/14/2017    Procedure: COLONOSCOPY;  Surgeon: Robb Yepez MD;  Location: [facility];  Service: Endoscopy;  Laterality: N/A; repeat in 5 years    UPPER GASTROINTESTINAL ENDOSCOPY  09/14/2017    Dr. Yepez        Have you been fasting for at least 6 hours? Yes    Is there any chance you may be pregnant or breastfeeding? No    Assay: 13.48 MCi@:9.35   Injection Site:rt ac     Residual: .437 mCi@: 9.37   Technologist: Vane Ryan Injected:13.04mCi

## 2023-03-02 ENCOUNTER — HOSPITAL ENCOUNTER (OUTPATIENT)
Dept: RADIOLOGY | Facility: HOSPITAL | Age: 78
Discharge: HOME | End: 2023-03-02
Attending: NURSE PRACTITIONER
Payer: MEDICARE

## 2023-03-02 DIAGNOSIS — R91.1 PULMONARY NODULE 1 CM OR GREATER IN DIAMETER: ICD-10-CM

## 2023-03-02 DIAGNOSIS — R94.2 ABNORMAL PET OF RIGHT LUNG: ICD-10-CM

## 2023-03-02 LAB — GLUCOSE SERPL-MCNC: 87 MG/DL (ref 70–110)

## 2023-03-02 PROCEDURE — 78815 PET IMAGE W/CT SKULL-THIGH: CPT | Mod: 26,PS,, | Performed by: RADIOLOGY

## 2023-03-02 PROCEDURE — 78815 PET IMAGE W/CT SKULL-THIGH: CPT | Mod: TC,PN

## 2023-03-02 PROCEDURE — 78815 NM PET CT ROUTINE: ICD-10-PCS | Mod: 26,PS,, | Performed by: RADIOLOGY

## 2023-03-02 PROCEDURE — A9552 F18 FDG: HCPCS | Mod: PN

## 2023-03-02 NOTE — PROGRESS NOTES
PET Imaging Questionnaire    Are you a Diabetic? Recent Blood Sugar level? No    Are you anemic? Bone Marrow Stimulation Meds? No    Have you had a CT Scan, if so when & where was your last one? Yes -     Have you had a PET Scan, if so when & where was your last one? Yes -     Chemotherapy or currently on Chemotherapy? No    Radiation therapy? No    Surgical History:   Past Surgical History:   Procedure Laterality Date    by pass on leg       CARDIAC CATHETERIZATION      cardiac stents       COLONOSCOPY N/A 9/14/2017    Procedure: COLONOSCOPY;  Surgeon: Robb Yepez MD;  Location: [facility];  Service: Endoscopy;  Laterality: N/A; repeat in 5 years    UPPER GASTROINTESTINAL ENDOSCOPY  09/14/2017    Dr. Yepez        Have you been fasting for at least 6 hours? Yes    Is there any chance you may be pregnant or breastfeeding? No    Assay: 12.54 MCi@:8:20   Injection Site:RT AC    Residual: 1.304 mCi@: 8:22   Technologist: Ki Ryan Injected:11.24mCi

## 2023-06-06 ENCOUNTER — HOSPITAL ENCOUNTER (OUTPATIENT)
Dept: RADIOLOGY | Facility: HOSPITAL | Age: 78
Discharge: HOME | End: 2023-06-06
Attending: NURSE PRACTITIONER
Payer: MEDICARE

## 2023-06-06 DIAGNOSIS — R59.0 LOCALIZED ENLARGED LYMPH NODES: ICD-10-CM

## 2023-06-06 PROCEDURE — 71260 CT THORAX DX C+: CPT | Mod: TC,PO

## 2023-06-06 PROCEDURE — 71260 CT THORAX DX C+: CPT | Mod: 26,,, | Performed by: RADIOLOGY

## 2023-06-06 PROCEDURE — 25500020 PHARM REV CODE 255: Mod: PO | Performed by: NURSE PRACTITIONER

## 2023-06-06 PROCEDURE — 71260 CT CHEST WITH CONTRAST: ICD-10-PCS | Mod: 26,,, | Performed by: RADIOLOGY

## 2023-06-06 RX ADMIN — IOHEXOL 75 ML: 350 INJECTION, SOLUTION INTRAVENOUS at 09:06

## 2023-06-13 ENCOUNTER — OFFICE VISIT (OUTPATIENT)
Dept: CARDIOLOGY | Facility: CLINIC | Age: 78
End: 2023-06-13
Payer: MEDICARE

## 2023-06-13 VITALS
WEIGHT: 114.88 LBS | HEART RATE: 93 BPM | DIASTOLIC BLOOD PRESSURE: 72 MMHG | BODY MASS INDEX: 21.14 KG/M2 | HEIGHT: 62 IN | SYSTOLIC BLOOD PRESSURE: 169 MMHG

## 2023-06-13 DIAGNOSIS — J41.0 SIMPLE CHRONIC BRONCHITIS: Primary | ICD-10-CM

## 2023-06-13 DIAGNOSIS — I25.10 CORONARY ARTERY DISEASE INVOLVING NATIVE CORONARY ARTERY OF NATIVE HEART WITHOUT ANGINA PECTORIS: ICD-10-CM

## 2023-06-13 DIAGNOSIS — E78.2 MIXED HYPERLIPIDEMIA: ICD-10-CM

## 2023-06-13 DIAGNOSIS — I10 PRIMARY HYPERTENSION: ICD-10-CM

## 2023-06-13 DIAGNOSIS — R94.31 ABNORMAL ELECTROCARDIOGRAM (ECG) (EKG): ICD-10-CM

## 2023-06-13 DIAGNOSIS — I73.9 PAD (PERIPHERAL ARTERY DISEASE): ICD-10-CM

## 2023-06-13 PROCEDURE — 1101F PT FALLS ASSESS-DOCD LE1/YR: CPT | Mod: CPTII,S$GLB,, | Performed by: INTERNAL MEDICINE

## 2023-06-13 PROCEDURE — 99999 PR PBB SHADOW E&M-EST. PATIENT-LVL III: ICD-10-PCS | Mod: PBBFAC,,, | Performed by: INTERNAL MEDICINE

## 2023-06-13 PROCEDURE — 99204 PR OFFICE/OUTPT VISIT, NEW, LEVL IV, 45-59 MIN: ICD-10-PCS | Mod: S$GLB,,, | Performed by: INTERNAL MEDICINE

## 2023-06-13 PROCEDURE — 3077F SYST BP >= 140 MM HG: CPT | Mod: CPTII,S$GLB,, | Performed by: INTERNAL MEDICINE

## 2023-06-13 PROCEDURE — 3078F PR MOST RECENT DIASTOLIC BLOOD PRESSURE < 80 MM HG: ICD-10-PCS | Mod: CPTII,S$GLB,, | Performed by: INTERNAL MEDICINE

## 2023-06-13 PROCEDURE — 1160F PR REVIEW ALL MEDS BY PRESCRIBER/CLIN PHARMACIST DOCUMENTED: ICD-10-PCS | Mod: CPTII,S$GLB,, | Performed by: INTERNAL MEDICINE

## 2023-06-13 PROCEDURE — 1159F MED LIST DOCD IN RCRD: CPT | Mod: CPTII,S$GLB,, | Performed by: INTERNAL MEDICINE

## 2023-06-13 PROCEDURE — 3078F DIAST BP <80 MM HG: CPT | Mod: CPTII,S$GLB,, | Performed by: INTERNAL MEDICINE

## 2023-06-13 PROCEDURE — 1126F PR PAIN SEVERITY QUANTIFIED, NO PAIN PRESENT: ICD-10-PCS | Mod: CPTII,S$GLB,, | Performed by: INTERNAL MEDICINE

## 2023-06-13 PROCEDURE — 1101F PR PT FALLS ASSESS DOC 0-1 FALLS W/OUT INJ PAST YR: ICD-10-PCS | Mod: CPTII,S$GLB,, | Performed by: INTERNAL MEDICINE

## 2023-06-13 PROCEDURE — 3288F PR FALLS RISK ASSESSMENT DOCUMENTED: ICD-10-PCS | Mod: CPTII,S$GLB,, | Performed by: INTERNAL MEDICINE

## 2023-06-13 PROCEDURE — 1160F RVW MEDS BY RX/DR IN RCRD: CPT | Mod: CPTII,S$GLB,, | Performed by: INTERNAL MEDICINE

## 2023-06-13 PROCEDURE — 3077F PR MOST RECENT SYSTOLIC BLOOD PRESSURE >= 140 MM HG: ICD-10-PCS | Mod: CPTII,S$GLB,, | Performed by: INTERNAL MEDICINE

## 2023-06-13 PROCEDURE — 1126F AMNT PAIN NOTED NONE PRSNT: CPT | Mod: CPTII,S$GLB,, | Performed by: INTERNAL MEDICINE

## 2023-06-13 PROCEDURE — 99204 OFFICE O/P NEW MOD 45 MIN: CPT | Mod: S$GLB,,, | Performed by: INTERNAL MEDICINE

## 2023-06-13 PROCEDURE — 1159F PR MEDICATION LIST DOCUMENTED IN MEDICAL RECORD: ICD-10-PCS | Mod: CPTII,S$GLB,, | Performed by: INTERNAL MEDICINE

## 2023-06-13 PROCEDURE — 99999 PR PBB SHADOW E&M-EST. PATIENT-LVL III: CPT | Mod: PBBFAC,,, | Performed by: INTERNAL MEDICINE

## 2023-06-13 PROCEDURE — 3288F FALL RISK ASSESSMENT DOCD: CPT | Mod: CPTII,S$GLB,, | Performed by: INTERNAL MEDICINE

## 2023-06-13 NOTE — PROGRESS NOTES
Subjective:    Patient ID:  Francois Otero Sr. is a 77 y.o. male patient here for evaluation Establish Care      History of Present Illness:  New patient cardiac evaluation.  CAD, peripheral arterial disease.  In 0 9 patient underwent cardiac catheterization with PCI stent right coronary artery, shortly thereafter had what sounds like fem-fem bypass.  No recent noninvasive cardiac assessment.  Stable RAMOS underlying severe COPD, past tobacco use.  No angina, no arrhythmia.  Patient remains on low-dose aspirin.  History of hypertension dyslipidemia   Remote past tobacco use.          Review of patient's allergies indicates:   Allergen Reactions    Lisinopril Edema    Penicillin g sodium      Other reaction(s): unknown. was told by his mother.    Penicillins     Sulfa (sulfonamide antibiotics)        Past Medical History:   Diagnosis Date    Colon polyp     COPD (chronic obstructive pulmonary disease)     Coronary artery disease     Hyperlipidemia     Hypertension     On home oxygen therapy     Pneumonia due to COVID-19 virus 2020 12/3/2020    Tobacco abuse     1 PPD X 59 Yrs    Vertigo      Past Surgical History:   Procedure Laterality Date    by pass on leg       CARDIAC CATHETERIZATION      cardiac stents       COLONOSCOPY N/A 2017    Procedure: COLONOSCOPY;  Surgeon: Robb Yepez MD;  Location: [facility];  Service: Endoscopy;  Laterality: N/A; repeat in 5 years    UPPER GASTROINTESTINAL ENDOSCOPY  2017    Dr. Yepez     Social History     Tobacco Use    Smoking status: Former     Types: Cigarettes     Quit date: 9/3/2015     Years since quittin.7    Smokeless tobacco: Never   Substance Use Topics    Alcohol use: Yes     Alcohol/week: 0.0 standard drinks     Comment: rarely     Drug use: No        Review of Systems:    As noted in HPI in addition         REVIEW OF SYSTEMS  Review of Systems   Constitutional: Negative for decreased appetite, diaphoresis, night sweats, weight gain and  weight loss.   HENT:  Negative for nosebleeds and odynophagia.    Eyes:  Negative for double vision and photophobia.   Cardiovascular:  Negative for chest pain, claudication, cyanosis, dyspnea on exertion, irregular heartbeat, leg swelling, near-syncope, orthopnea, palpitations, paroxysmal nocturnal dyspnea and syncope.   Respiratory:  Negative for cough, hemoptysis, shortness of breath and wheezing.    Hematologic/Lymphatic: Negative for adenopathy.   Skin:  Negative for flushing, skin cancer and suspicious lesions.   Musculoskeletal:  Negative for gout, myalgias and neck pain.   Gastrointestinal:  Negative for abdominal pain, heartburn, hematemesis and hematochezia.   Genitourinary:  Negative for bladder incontinence, hesitancy and nocturia.   Neurological:  Negative for focal weakness, headaches, light-headedness and paresthesias.   Psychiatric/Behavioral:  Negative for memory loss and substance abuse.      Objective:        Vitals:    06/13/23 1306   BP: (!) 169/72   Pulse: 93       Lab Results   Component Value Date    WBC 14.56 (H) 01/09/2023    HGB 14.6 01/09/2023    HCT 44.7 01/09/2023     01/09/2023    CHOL 193 03/09/2022    TRIG 100 03/09/2022    HDL 70 03/09/2022    ALT 15 01/09/2023    AST 28 01/09/2023     01/09/2023    K 4.0 01/09/2023     01/09/2023    CREATININE 1.1 06/06/2023    BUN 17 01/09/2023    CO2 26 01/09/2023    TSH 4.020 (H) 01/09/2023    PSA 0.74 11/21/2019    HGBA1C 6.1 (H) 12/03/2020      CARDIOGRAM RESULTS  No results found for this or any previous visit.        CURRENT/PREVIOUS VISIT EKG  Results for orders placed or performed during the hospital encounter of 04/14/23   EKG 12-lead    Collection Time: 04/14/23 11:51 AM    Narrative    Test Reason : Z01.818,    Vent. Rate : 091 BPM     Atrial Rate : 078 BPM     P-R Int : 152 ms          QRS Dur : 144 ms      QT Int : 418 ms       P-R-T Axes : 069 270 066 degrees     QTc Int : 514 ms    Sinus rhythm with occasional  Premature ventricular complexes  Right bundle branch block  Abnormal ECG  When compared with ECG of 03-DEC-2020 16:19,  Premature ventricular complexes are now Present  Right bundle branch block is now Present  Confirmed by Bee VALENTINO, Tevin LIZARRAGA ([id number]) on 4/14/2023 3:26:03 PM    Referred By: GERMAN KAUFMAN           Confirmed By:Tevin Gamboa MD     No valid procedures specified.   No results found for this or any previous visit.    No valid procedures specified.    PHYSICAL EXAM  CONSTITUTIONAL: Well built, well nourished in no apparent distress  NECK: no carotid bruit, no JVD  LUNGS: CTA  CHEST WALL: no tenderness,  HEART: regular rate and rhythm, S1, S2 normal, no murmur, click, rub or gallop   ABDOMEN: soft, non-tender; bowel sounds normal; no masses,  no organomegaly  EXTREMITIES: Extremities normal, no edema, no calf tenderness noted  VASCULAR EXAM: 2 PLUS UPPER AND 2+ LOWER EXT PULSES  NEURO: AAO X 3, NO ACUTE FOCAL OR LATERALIZING FINDINGS    I HAVE REVIEWED :    The vital signs, nurses notes, and all the pertinent radiology and labs.         Current Outpatient Medications   Medication Instructions    ALPRAZolam (XANAX) 0.25 mg, Oral, Nightly PRN    aspirin (ECOTRIN) 325 mg, Oral, Daily    budesonide-glycopyr-formoterol (BREZTRI AEROSPHERE) 160-9-4.8 mcg/actuation HFAA 2 puffs, Inhalation, 2 times daily    ipratropium (ATROVENT) 500 mcg, Nebulization, 3 times daily, INHALE THE CONTENTS OF 1 VIAL VIA NEBULIZER THREE TIMES DAILY    levalbuterol (XOPENEX) 1.25 mg, Nebulization, 3 times daily    predniSONE (DELTASONE) 10 mg, Oral, Daily    VENTOLIN HFA 90 mcg/actuation inhaler INHALE 2 PUFFS INTO THE LUNGS EVERY 6 HOURS AS NEEDED FOR WHEEZING OR SHORTNESS OF BREATH          Assessment:   CAD.  PCI stent right coronary 2009    PAD  lower extremity vascular disease with aortoiliac disease fem-fem bypass.  Two thousand nine.  Today's examination reveals 2+ femoral and 1 to 2+ popliteal pedal pulses.  No  claudication.    COPD, past tobacco use.    Hypertension dyslipidemia        Plan:   Recent labs stable, recommend screening Lexiscan as well as cardiac echo.  Carotid ultrasound.          No follow-ups on file.

## 2023-07-06 ENCOUNTER — PATIENT MESSAGE (OUTPATIENT)
Dept: RADIOLOGY | Facility: HOSPITAL | Age: 78
End: 2023-07-06
Payer: MEDICARE

## 2023-07-10 ENCOUNTER — HOSPITAL ENCOUNTER (OUTPATIENT)
Dept: RADIOLOGY | Facility: HOSPITAL | Age: 78
Discharge: HOME | End: 2023-07-10
Attending: INTERNAL MEDICINE
Payer: MEDICARE

## 2023-07-10 ENCOUNTER — CLINICAL SUPPORT (OUTPATIENT)
Dept: CARDIOLOGY | Facility: HOSPITAL | Age: 78
End: 2023-07-10
Attending: INTERNAL MEDICINE
Payer: MEDICARE

## 2023-07-10 VITALS — WEIGHT: 114 LBS | BODY MASS INDEX: 20.98 KG/M2 | HEIGHT: 62 IN

## 2023-07-10 DIAGNOSIS — E78.2 MIXED HYPERLIPIDEMIA: ICD-10-CM

## 2023-07-10 DIAGNOSIS — I10 PRIMARY HYPERTENSION: ICD-10-CM

## 2023-07-10 DIAGNOSIS — J41.0 SIMPLE CHRONIC BRONCHITIS: ICD-10-CM

## 2023-07-10 DIAGNOSIS — I73.9 PAD (PERIPHERAL ARTERY DISEASE): ICD-10-CM

## 2023-07-10 DIAGNOSIS — I25.10 CORONARY ARTERY DISEASE INVOLVING NATIVE CORONARY ARTERY OF NATIVE HEART WITHOUT ANGINA PECTORIS: ICD-10-CM

## 2023-07-10 DIAGNOSIS — R94.31 ABNORMAL ELECTROCARDIOGRAM (ECG) (EKG): ICD-10-CM

## 2023-07-10 LAB
ASCENDING AORTA: 2.67 CM
AV INDEX (PROSTH): 0.92
AV MEAN GRADIENT: 4 MMHG
AV PEAK GRADIENT: 7 MMHG
AV VALVE AREA: 3.04 CM2
AV VELOCITY RATIO: 0.93
BSA FOR ECHO PROCEDURE: 1.5 M2
CV ECHO LV RWT: 0.38 CM
DOP CALC AO PEAK VEL: 1.29 M/S
DOP CALC AO VTI: 22.7 CM
DOP CALC LVOT AREA: 3.3 CM2
DOP CALC LVOT DIAMETER: 2.05 CM
DOP CALC LVOT PEAK VEL: 1.2 M/S
DOP CALC LVOT STROKE VOLUME: 68.95 CM3
DOP CALCLVOT PEAK VEL VTI: 20.9 CM
E WAVE DECELERATION TIME: 145.62 MSEC
E/A RATIO: 0.84
E/E' RATIO: 12.33 M/S
ECHO LV POSTERIOR WALL: 0.84 CM (ref 0.6–1.1)
EJECTION FRACTION: 60 %
FRACTIONAL SHORTENING: 28 % (ref 28–44)
INTERVENTRICULAR SEPTUM: 0.96 CM (ref 0.6–1.1)
LA MAJOR: 3.4 CM
LA MINOR: 3.42 CM
LA WIDTH: 3.6 CM
LEFT ARM DIASTOLIC BLOOD PRESSURE: 73 MMHG
LEFT ARM SYSTOLIC BLOOD PRESSURE: 169 MMHG
LEFT ATRIUM SIZE: 2.77 CM
LEFT ATRIUM VOLUME INDEX: 19.1 ML/M2
LEFT ATRIUM VOLUME: 28.9 CM3
LEFT CBA DIAS: 6 CM/S
LEFT CBA SYS: 61 CM/S
LEFT CCA DIST DIAS: 13 CM/S
LEFT CCA DIST SYS: 75 CM/S
LEFT CCA MID DIAS: 11 CM/S
LEFT CCA MID SYS: 103 CM/S
LEFT CCA PROX DIAS: 11 CM/S
LEFT CCA PROX SYS: 120 CM/S
LEFT ECA DIAS: 8 CM/S
LEFT ECA SYS: 152 CM/S
LEFT ICA DIST DIAS: 19 CM/S
LEFT ICA DIST SYS: 120 CM/S
LEFT ICA MID DIAS: 26 CM/S
LEFT ICA MID SYS: 120 CM/S
LEFT ICA PROX DIAS: 10 CM/S
LEFT ICA PROX SYS: 52 CM/S
LEFT INTERNAL DIMENSION IN SYSTOLE: 3.21 CM (ref 2.1–4)
LEFT VENTRICLE DIASTOLIC VOLUME INDEX: 58.89 ML/M2
LEFT VENTRICLE DIASTOLIC VOLUME: 88.92 ML
LEFT VENTRICLE MASS INDEX: 86 G/M2
LEFT VENTRICLE SYSTOLIC VOLUME INDEX: 27.3 ML/M2
LEFT VENTRICLE SYSTOLIC VOLUME: 41.23 ML
LEFT VENTRICULAR INTERNAL DIMENSION IN DIASTOLE: 4.43 CM (ref 3.5–6)
LEFT VENTRICULAR MASS: 129.45 G
LEFT VERTEBRAL DIAS: 0 CM/S
LEFT VERTEBRAL SYS: 57 CM/S
LV LATERAL E/E' RATIO: 12.33 M/S
LV SEPTAL E/E' RATIO: 12.33 M/S
LVOT MG: 2.92 MMHG
LVOT MV: 0.81 CM/S
MV PEAK A VEL: 0.88 M/S
MV PEAK E VEL: 0.74 M/S
MV STENOSIS PRESSURE HALF TIME: 42.23 MS
MV VALVE AREA P 1/2 METHOD: 5.21 CM2
OHS CV CAROTID RIGHT ICA EDV HIGHEST: 19
OHS CV CAROTID ULTRASOUND LEFT ICA/CCA RATIO: 1.6
OHS CV CAROTID ULTRASOUND RIGHT ICA/CCA RATIO: 1.12
OHS CV PV CAROTID LEFT HIGHEST CCA: 120
OHS CV PV CAROTID LEFT HIGHEST ICA: 120
OHS CV PV CAROTID RIGHT HIGHEST CCA: 111
OHS CV PV CAROTID RIGHT HIGHEST ICA: 93
OHS CV US CAROTID LEFT HIGHEST EDV: 26
PULM VEIN S/D RATIO: 1.3
PV PEAK D VEL: 0.53 M/S
PV PEAK S VEL: 0.69 M/S
RA MAJOR: 3.08 CM
RA PRESSURE: 3 MMHG
RIGHT ARM DIASTOLIC BLOOD PRESSURE: 72 MMHG
RIGHT ARM SYSTOLIC BLOOD PRESSURE: 169 MMHG
RIGHT CBA DIAS: 12 CM/S
RIGHT CBA SYS: 80 CM/S
RIGHT CCA DIST DIAS: 11 CM/S
RIGHT CCA DIST SYS: 83 CM/S
RIGHT CCA MID DIAS: 8 CM/S
RIGHT CCA MID SYS: 89 CM/S
RIGHT CCA PROX DIAS: 10 CM/S
RIGHT CCA PROX SYS: 111 CM/S
RIGHT ECA DIAS: 6 CM/S
RIGHT ECA SYS: 109 CM/S
RIGHT ICA DIST DIAS: 19 CM/S
RIGHT ICA DIST SYS: 93 CM/S
RIGHT ICA MID DIAS: 15 CM/S
RIGHT ICA MID SYS: 82 CM/S
RIGHT ICA PROX DIAS: 15 CM/S
RIGHT ICA PROX SYS: 74 CM/S
RIGHT VENTRICULAR END-DIASTOLIC DIMENSION: 3.42 CM
RIGHT VENTRICULAR LENGTH IN DIASTOLE (APICAL 4-CHAMBER VIEW): 5.9 CM
RIGHT VERTEBRAL DIAS: 11 CM/S
RIGHT VERTEBRAL SYS: 55 CM/S
RV MID DIAMA: 1.91 CM
RV TISSUE DOPPLER FREE WALL SYSTOLIC VELOCITY 1 (APICAL 4 CHAMBER VIEW): 14.02 CM/S
SINUS: 2.78 CM
STJ: 2.55 CM
TDI LATERAL: 0.06 M/S
TDI SEPTAL: 0.06 M/S
TDI: 0.06 M/S
TRICUSPID ANNULAR PLANE SYSTOLIC EXCURSION: 2.66 CM

## 2023-07-10 PROCEDURE — 93880 EXTRACRANIAL BILAT STUDY: CPT | Mod: 26,,, | Performed by: INTERNAL MEDICINE

## 2023-07-10 PROCEDURE — 93306 TTE W/DOPPLER COMPLETE: CPT | Mod: PO

## 2023-07-10 PROCEDURE — 93880 CV US DOPPLER CAROTID (CUPID ONLY): ICD-10-PCS | Mod: 26,,, | Performed by: INTERNAL MEDICINE

## 2023-07-10 PROCEDURE — 93306 TTE W/DOPPLER COMPLETE: CPT | Mod: 26,,, | Performed by: INTERNAL MEDICINE

## 2023-07-10 PROCEDURE — 93880 EXTRACRANIAL BILAT STUDY: CPT | Mod: PO

## 2023-07-10 PROCEDURE — 93306 ECHO (CUPID ONLY): ICD-10-PCS | Mod: 26,,, | Performed by: INTERNAL MEDICINE

## 2023-07-11 ENCOUNTER — PATIENT MESSAGE (OUTPATIENT)
Dept: RADIOLOGY | Facility: HOSPITAL | Age: 78
End: 2023-07-11
Payer: MEDICARE

## 2023-07-13 ENCOUNTER — HOSPITAL ENCOUNTER (OUTPATIENT)
Dept: RADIOLOGY | Facility: HOSPITAL | Age: 78
Discharge: HOME | End: 2023-07-13
Attending: INTERNAL MEDICINE
Payer: MEDICARE

## 2023-07-13 ENCOUNTER — CLINICAL SUPPORT (OUTPATIENT)
Dept: CARDIOLOGY | Facility: HOSPITAL | Age: 78
End: 2023-07-13
Attending: INTERNAL MEDICINE
Payer: MEDICARE

## 2023-07-13 VITALS — HEIGHT: 62 IN | BODY MASS INDEX: 20.98 KG/M2 | WEIGHT: 114 LBS

## 2023-07-13 LAB
CV PHARM DOSE: 0.4 MG
CV STRESS BASE HR: 86 BPM
DIASTOLIC BLOOD PRESSURE: 81 MMHG
NUC STRESS EJECTION FRACTION: 84 %
OHS CV CPX 1 MINUTE RECOVERY HEART RATE: 103 BPM
OHS CV CPX 85 PERCENT MAX PREDICTED HEART RATE MALE: 121
OHS CV CPX MAX PREDICTED HEART RATE: 142
OHS CV CPX PATIENT IS FEMALE: 0
OHS CV CPX PATIENT IS MALE: 1
OHS CV CPX PEAK DIASTOLIC BLOOD PRESSURE: 73 MMHG
OHS CV CPX PEAK HEAR RATE: 106 BPM
OHS CV CPX PEAK RATE PRESSURE PRODUCT: NORMAL
OHS CV CPX PEAK SYSTOLIC BLOOD PRESSURE: 183 MMHG
OHS CV CPX PERCENT MAX PREDICTED HEART RATE ACHIEVED: 75
OHS CV CPX RATE PRESSURE PRODUCT PRESENTING: NORMAL
OHS CV PHARM TIME: 1417 MIN
SYSTOLIC BLOOD PRESSURE: 174 MMHG

## 2023-07-13 PROCEDURE — 78452 HT MUSCLE IMAGE SPECT MULT: CPT | Mod: 26,,, | Performed by: INTERNAL MEDICINE

## 2023-07-13 PROCEDURE — 78452 NUCLEAR STRESS - CARDIOLOGY INTERPRETED (CUPID ONLY): ICD-10-PCS | Mod: 26,,, | Performed by: INTERNAL MEDICINE

## 2023-07-13 PROCEDURE — A9502 TC99M TETROFOSMIN: HCPCS | Mod: PO

## 2023-07-13 PROCEDURE — 63600175 PHARM REV CODE 636 W HCPCS: Mod: PO | Performed by: INTERNAL MEDICINE

## 2023-07-13 PROCEDURE — 93017 CV STRESS TEST TRACING ONLY: CPT | Mod: PO

## 2023-07-13 PROCEDURE — 93018 CV STRESS TEST I&R ONLY: CPT | Mod: ,,, | Performed by: INTERNAL MEDICINE

## 2023-07-13 PROCEDURE — 93018 PR CARDIAC STRESS TST,INTERP/REPT ONLY: ICD-10-PCS | Mod: ,,, | Performed by: INTERNAL MEDICINE

## 2023-07-13 PROCEDURE — 93016 CV STRESS TEST SUPVJ ONLY: CPT | Mod: ,,, | Performed by: INTERNAL MEDICINE

## 2023-07-13 PROCEDURE — 93016 NUCLEAR STRESS - CARDIOLOGY INTERPRETED (CUPID ONLY): ICD-10-PCS | Mod: ,,, | Performed by: INTERNAL MEDICINE

## 2023-07-13 PROCEDURE — 78452 HT MUSCLE IMAGE SPECT MULT: CPT | Mod: PO

## 2023-07-13 RX ORDER — REGADENOSON 0.08 MG/ML
0.4 INJECTION, SOLUTION INTRAVENOUS
Status: COMPLETED | OUTPATIENT
Start: 2023-07-13 | End: 2023-07-13

## 2023-07-13 RX ADMIN — REGADENOSON 0.4 MG: 0.08 INJECTION, SOLUTION INTRAVENOUS at 02:07

## 2023-07-17 ENCOUNTER — OFFICE VISIT (OUTPATIENT)
Dept: CARDIOLOGY | Facility: CLINIC | Age: 78
End: 2023-07-17
Payer: MEDICARE

## 2023-07-17 VITALS
HEIGHT: 62 IN | SYSTOLIC BLOOD PRESSURE: 180 MMHG | WEIGHT: 116.63 LBS | BODY MASS INDEX: 21.46 KG/M2 | DIASTOLIC BLOOD PRESSURE: 90 MMHG | HEART RATE: 82 BPM

## 2023-07-17 DIAGNOSIS — J43.1 PANLOBULAR EMPHYSEMA: Primary | ICD-10-CM

## 2023-07-17 DIAGNOSIS — I10 PRIMARY HYPERTENSION: ICD-10-CM

## 2023-07-17 DIAGNOSIS — E78.2 MIXED HYPERLIPIDEMIA: ICD-10-CM

## 2023-07-17 DIAGNOSIS — I73.9 PAD (PERIPHERAL ARTERY DISEASE): ICD-10-CM

## 2023-07-17 DIAGNOSIS — I25.10 CORONARY ARTERY DISEASE INVOLVING NATIVE CORONARY ARTERY OF NATIVE HEART WITHOUT ANGINA PECTORIS: ICD-10-CM

## 2023-07-17 PROCEDURE — 99999 PR PBB SHADOW E&M-EST. PATIENT-LVL III: ICD-10-PCS | Mod: PBBFAC,,, | Performed by: INTERNAL MEDICINE

## 2023-07-17 PROCEDURE — 3077F PR MOST RECENT SYSTOLIC BLOOD PRESSURE >= 140 MM HG: ICD-10-PCS | Mod: CPTII,S$GLB,, | Performed by: INTERNAL MEDICINE

## 2023-07-17 PROCEDURE — 1160F RVW MEDS BY RX/DR IN RCRD: CPT | Mod: CPTII,S$GLB,, | Performed by: INTERNAL MEDICINE

## 2023-07-17 PROCEDURE — 3080F PR MOST RECENT DIASTOLIC BLOOD PRESSURE >= 90 MM HG: ICD-10-PCS | Mod: CPTII,S$GLB,, | Performed by: INTERNAL MEDICINE

## 2023-07-17 PROCEDURE — 1126F PR PAIN SEVERITY QUANTIFIED, NO PAIN PRESENT: ICD-10-PCS | Mod: CPTII,S$GLB,, | Performed by: INTERNAL MEDICINE

## 2023-07-17 PROCEDURE — 1101F PR PT FALLS ASSESS DOC 0-1 FALLS W/OUT INJ PAST YR: ICD-10-PCS | Mod: CPTII,S$GLB,, | Performed by: INTERNAL MEDICINE

## 2023-07-17 PROCEDURE — 3077F SYST BP >= 140 MM HG: CPT | Mod: CPTII,S$GLB,, | Performed by: INTERNAL MEDICINE

## 2023-07-17 PROCEDURE — 1126F AMNT PAIN NOTED NONE PRSNT: CPT | Mod: CPTII,S$GLB,, | Performed by: INTERNAL MEDICINE

## 2023-07-17 PROCEDURE — 99999 PR PBB SHADOW E&M-EST. PATIENT-LVL III: CPT | Mod: PBBFAC,,, | Performed by: INTERNAL MEDICINE

## 2023-07-17 PROCEDURE — 1159F MED LIST DOCD IN RCRD: CPT | Mod: CPTII,S$GLB,, | Performed by: INTERNAL MEDICINE

## 2023-07-17 PROCEDURE — 3288F FALL RISK ASSESSMENT DOCD: CPT | Mod: CPTII,S$GLB,, | Performed by: INTERNAL MEDICINE

## 2023-07-17 PROCEDURE — 1101F PT FALLS ASSESS-DOCD LE1/YR: CPT | Mod: CPTII,S$GLB,, | Performed by: INTERNAL MEDICINE

## 2023-07-17 PROCEDURE — 1160F PR REVIEW ALL MEDS BY PRESCRIBER/CLIN PHARMACIST DOCUMENTED: ICD-10-PCS | Mod: CPTII,S$GLB,, | Performed by: INTERNAL MEDICINE

## 2023-07-17 PROCEDURE — 99214 OFFICE O/P EST MOD 30 MIN: CPT | Mod: S$GLB,,, | Performed by: INTERNAL MEDICINE

## 2023-07-17 PROCEDURE — 3288F PR FALLS RISK ASSESSMENT DOCUMENTED: ICD-10-PCS | Mod: CPTII,S$GLB,, | Performed by: INTERNAL MEDICINE

## 2023-07-17 PROCEDURE — 3080F DIAST BP >= 90 MM HG: CPT | Mod: CPTII,S$GLB,, | Performed by: INTERNAL MEDICINE

## 2023-07-17 PROCEDURE — 1159F PR MEDICATION LIST DOCUMENTED IN MEDICAL RECORD: ICD-10-PCS | Mod: CPTII,S$GLB,, | Performed by: INTERNAL MEDICINE

## 2023-07-17 PROCEDURE — 99214 PR OFFICE/OUTPT VISIT, EST, LEVL IV, 30-39 MIN: ICD-10-PCS | Mod: S$GLB,,, | Performed by: INTERNAL MEDICINE

## 2023-07-17 NOTE — PROGRESS NOTES
Subjective:    Patient ID:  Francois Otero Sr. is a 77 y.o. male patient here for evaluation Follow-up      History of Present Illness:  Cardiology follow-up, coronary disease.  Peripheral arterial disease with fem-fem bypass.  Patient denies claudication, no angina.  PCI stent right coronary artery .    2+ femoral and popliteal pulses.  Noninvasive cardiac assessment this visit with negative nuclear study unremarkable echo.    History of COPD, past tobacco use.  History of hypertension, dyslipidemia.             Review of patient's allergies indicates:   Allergen Reactions    Lisinopril Edema    Penicillin g sodium      Other reaction(s): unknown. was told by his mother.    Penicillins     Sulfa (sulfonamide antibiotics)        Past Medical History:   Diagnosis Date    Colon polyp     COPD (chronic obstructive pulmonary disease)     Coronary artery disease     Hyperlipidemia     Hypertension     On home oxygen therapy     Pneumonia due to COVID-19 virus 2020 12/3/2020    Tobacco abuse     1 PPD X 59 Yrs    Vertigo      Past Surgical History:   Procedure Laterality Date    by pass on leg       CARDIAC CATHETERIZATION      cardiac stents       COLONOSCOPY N/A 2017    Procedure: COLONOSCOPY;  Surgeon: Robb Yepez MD;  Location: [facility];  Service: Endoscopy;  Laterality: N/A; repeat in 5 years    UPPER GASTROINTESTINAL ENDOSCOPY  2017    Dr. Yepez     Social History     Tobacco Use    Smoking status: Former     Types: Cigarettes     Quit date: 9/3/2015     Years since quittin.8    Smokeless tobacco: Never   Substance Use Topics    Alcohol use: Yes     Alcohol/week: 0.0 standard drinks     Comment: rarely     Drug use: No        Review of Systems:    As noted in HPI in addition      REVIEW OF SYSTEMS  Review of Systems   Constitutional: Negative for decreased appetite, diaphoresis, night sweats, weight gain and weight loss.   HENT:  Negative for nosebleeds and odynophagia.    Eyes:   Negative for double vision and photophobia.   Cardiovascular:  Positive for dyspnea on exertion. Negative for chest pain, claudication, cyanosis, irregular heartbeat, leg swelling, near-syncope, orthopnea, palpitations, paroxysmal nocturnal dyspnea and syncope.   Respiratory:  Positive for shortness of breath. Negative for cough, hemoptysis and wheezing.    Hematologic/Lymphatic: Negative for adenopathy.   Skin:  Negative for flushing, skin cancer and suspicious lesions.   Musculoskeletal:  Negative for gout, myalgias and neck pain.   Gastrointestinal:  Negative for abdominal pain, heartburn, hematemesis and hematochezia.   Genitourinary:  Negative for bladder incontinence, hesitancy and nocturia.   Neurological:  Negative for focal weakness, headaches, light-headedness and paresthesias.   Psychiatric/Behavioral:  Negative for memory loss and substance abuse.             Objective:        Vitals:    07/17/23 1335   BP: (!) 180/90   Pulse: 82       Lab Results   Component Value Date    WBC 14.56 (H) 01/09/2023    HGB 14.6 01/09/2023    HCT 44.7 01/09/2023     01/09/2023    CHOL 193 03/09/2022    TRIG 100 03/09/2022    HDL 70 03/09/2022    ALT 15 01/09/2023    AST 28 01/09/2023     01/09/2023    K 4.0 01/09/2023     01/09/2023    CREATININE 1.1 06/06/2023    BUN 17 01/09/2023    CO2 26 01/09/2023    TSH 4.020 (H) 01/09/2023    PSA 0.74 11/21/2019    HGBA1C 6.1 (H) 12/03/2020        ECHOCARDIOGRAM RESULTS  Results for orders placed in visit on 07/10/23    Echo    Interpretation Summary  · Normal systolic function.  · The estimated ejection fraction is 60%.  · Normal left ventricular diastolic function.  · Normal right ventricular size with normal right ventricular systolic function.  · Normal central venous pressure (3 mmHg).        CURRENT/PREVIOUS VISIT EKG  Results for orders placed or performed during the hospital encounter of 04/14/23   EKG 12-lead    Collection Time: 04/14/23 11:51 AM     Narrative    Test Reason : Z01.818,    Vent. Rate : 091 BPM     Atrial Rate : 078 BPM     P-R Int : 152 ms          QRS Dur : 144 ms      QT Int : 418 ms       P-R-T Axes : 069 270 066 degrees     QTc Int : 514 ms    Sinus rhythm with occasional Premature ventricular complexes  Right bundle branch block  Abnormal ECG  When compared with ECG of 03-DEC-2020 16:19,  Premature ventricular complexes are now Present  Right bundle branch block is now Present  Confirmed by Bee VALENTINO, Tevin LIZARRAGA ([id number]) on 4/14/2023 3:26:03 PM    Referred By: GERMAN KAUFMAN           Confirmed By:Tevin Gamboa MD     No valid procedures specified.   Results for orders placed during the hospital encounter of 07/10/23    Nuclear Stress - Cardiology Interpreted    Interpretation Summary    Normal myocardial perfusion scan. There is no evidence of myocardial ischemia or infarction.    There is a  mild to moderate intensity fixed perfusion abnormality in the inferior wall of the left ventricle secondary to diaphragm attenuation.    The gated perfusion images showed an ejection fraction of 84% post stress.    There is normal wall motion post stress.    LV cavity size is normal at rest and normal at stress.    The ECG portion of the study is negative for ischemia.    The patient reported no chest pain during the stress test.    The patient reported arm pain during the stress test.    No valid procedures specified.    PHYSICAL EXAM  CONSTITUTIONAL: Well built, well nourished in no apparent distress  NECK: no carotid bruit, no JVD  LUNGS: CTA  CHEST WALL: no tenderness,  HEART: regular rate and rhythm, S1, S2 normal, no murmur, click, rub or gallop   ABDOMEN: soft, non-tender; bowel sounds normal; no masses,  no organomegaly  EXTREMITIES: Extremities normal, no edema, no calf tenderness noted  NEURO: AAO X 3    I HAVE REVIEWED :    The vital signs, nurses notes, and all the pertinent radiology and labs.         Current Outpatient Medications    Medication Instructions    ALPRAZolam (XANAX) 0.25 mg, Oral, Nightly PRN    aspirin (ECOTRIN) 325 mg, Oral, Daily    budesonide-glycopyr-formoterol (BREZTRI AEROSPHERE) 160-9-4.8 mcg/actuation HFAA 2 puffs, Inhalation, 2 times daily    ipratropium (ATROVENT) 500 mcg, Nebulization, 3 times daily, INHALE THE CONTENTS OF 1 VIAL VIA NEBULIZER THREE TIMES DAILY    levalbuterol (XOPENEX) 1.25 mg, Nebulization, 3 times daily    predniSONE (DELTASONE) 10 mg, Oral, Daily    VENTOLIN HFA 90 mcg/actuation inhaler INHALE 2 PUFFS INTO THE LUNGS EVERY 6 HOURS AS NEEDED FOR WHEEZING OR SHORTNESS OF BREATH          Assessment:   Peripheral arterial disease.  Fem-fem bypass.  Carotid ultrasound stable no significant carotid disease.  Peripheral vascular exam with 2+ femoral popliteal pulses bilaterally.    Coronary disease.  PCI stent 2009 right coronary artery.  Normal nuclear study.  Unremarkable echo.    Hypertension, dyslipidemia    CV, past tobacco use.    Recent chest CT with stable right upper lobe nodule, followed by Pulmonary.    Plan:   Meds reviewed and reconciled.  Recommend addition of aspirin 81 daily.  Monitor home blood pressure, call if elevated.  Follow-up in 6 months.  Risk factor modification.       No follow-ups on file.

## 2023-12-05 ENCOUNTER — HOSPITAL ENCOUNTER (INPATIENT)
Facility: HOSPITAL | Age: 78
LOS: 15 days | Discharge: HOME HEALTH | DRG: 190 | End: 2023-12-20
Attending: EMERGENCY MEDICINE | Admitting: INTERNAL MEDICINE
Payer: MEDICARE

## 2023-12-05 DIAGNOSIS — J44.1 COPD EXACERBATION: Primary | ICD-10-CM

## 2023-12-05 DIAGNOSIS — J96.11 CHRONIC HYPOXEMIC RESPIRATORY FAILURE: ICD-10-CM

## 2023-12-05 DIAGNOSIS — R06.02 SHORTNESS OF BREATH: ICD-10-CM

## 2023-12-05 DIAGNOSIS — J44.9 CHRONIC OBSTRUCTIVE PULMONARY DISEASE, UNSPECIFIED COPD TYPE: ICD-10-CM

## 2023-12-05 DIAGNOSIS — J44.9 COPD, MODERATE: ICD-10-CM

## 2023-12-05 PROBLEM — F41.9 ANXIETY: Status: ACTIVE | Noted: 2023-12-05

## 2023-12-05 PROBLEM — Z87.898 HISTORY OF BENZODIAZEPINE USE: Status: ACTIVE | Noted: 2023-12-05

## 2023-12-05 LAB
ALBUMIN SERPL BCP-MCNC: 4.3 G/DL (ref 3.5–5.2)
ALLENS TEST: NORMAL
ALP SERPL-CCNC: 122 U/L (ref 55–135)
ALT SERPL W/O P-5'-P-CCNC: 11 U/L (ref 10–44)
ANION GAP SERPL CALC-SCNC: 13 MMOL/L (ref 8–16)
AST SERPL-CCNC: 18 U/L (ref 10–40)
BASOPHILS NFR BLD: 0 % (ref 0–1.9)
BILIRUB SERPL-MCNC: 0.4 MG/DL (ref 0.1–1)
BNP SERPL-MCNC: 37 PG/ML (ref 0–99)
BUN SERPL-MCNC: 14 MG/DL (ref 8–23)
CALCIUM SERPL-MCNC: 10 MG/DL (ref 8.7–10.5)
CHLORIDE SERPL-SCNC: 106 MMOL/L (ref 95–110)
CO2 SERPL-SCNC: 20 MMOL/L (ref 23–29)
CREAT SERPL-MCNC: 0.9 MG/DL (ref 0.5–1.4)
DIFFERENTIAL METHOD: ABNORMAL
EOSINOPHIL NFR BLD: 0 % (ref 0–8)
ERYTHROCYTE [DISTWIDTH] IN BLOOD BY AUTOMATED COUNT: 15.2 % (ref 11.5–14.5)
EST. GFR  (NO RACE VARIABLE): >60 ML/MIN/1.73 M^2
GLUCOSE SERPL-MCNC: 120 MG/DL (ref 70–110)
HCO3 UR-SCNC: 24.9 MMOL/L (ref 24–28)
HCT VFR BLD AUTO: 45.7 % (ref 40–54)
HCV AB SERPL QL IA: NORMAL
HGB BLD-MCNC: 15.5 G/DL (ref 14–18)
HIV 1+2 AB+HIV1 P24 AG SERPL QL IA: NORMAL
IMM GRANULOCYTES # BLD AUTO: ABNORMAL K/UL (ref 0–0.04)
IMM GRANULOCYTES NFR BLD AUTO: ABNORMAL % (ref 0–0.5)
INFLUENZA A, MOLECULAR: NEGATIVE
INFLUENZA B, MOLECULAR: NEGATIVE
INR PPP: 1 (ref 0.8–1.2)
LYMPHOCYTES NFR BLD: 30 % (ref 18–48)
MAGNESIUM SERPL-MCNC: 1.9 MG/DL (ref 1.6–2.6)
MCH RBC QN AUTO: 29.6 PG (ref 27–31)
MCHC RBC AUTO-ENTMCNC: 33.9 G/DL (ref 32–36)
MCV RBC AUTO: 87 FL (ref 82–98)
MONOCYTES NFR BLD: 8 % (ref 4–15)
NEUTROPHILS NFR BLD: 62 % (ref 38–73)
NRBC BLD-RTO: 0 /100 WBC
PCO2 BLDA: 37.4 MMHG (ref 35–45)
PH SMN: 7.43 [PH] (ref 7.35–7.45)
PLATELET # BLD AUTO: 237 K/UL (ref 150–450)
PLATELET BLD QL SMEAR: ABNORMAL
PMV BLD AUTO: 9.7 FL (ref 9.2–12.9)
PO2 BLDA: 49 MMHG (ref 40–60)
POC BE: 1 MMOL/L
POC SATURATED O2: 85 % (ref 95–100)
POC TCO2: 26 MMOL/L (ref 24–29)
POTASSIUM SERPL-SCNC: 4.8 MMOL/L (ref 3.5–5.1)
PROT SERPL-MCNC: 8 G/DL (ref 6–8.4)
PROTHROMBIN TIME: 10.3 SEC (ref 9–12.5)
RBC # BLD AUTO: 5.24 M/UL (ref 4.6–6.2)
SAMPLE: NORMAL
SARS-COV-2 RDRP RESP QL NAA+PROBE: NEGATIVE
SITE: NORMAL
SODIUM SERPL-SCNC: 139 MMOL/L (ref 136–145)
SPECIMEN SOURCE: NORMAL
TROPONIN I SERPL DL<=0.01 NG/ML-MCNC: <0.006 NG/ML (ref 0–0.03)
WBC # BLD AUTO: 22.99 K/UL (ref 3.9–12.7)

## 2023-12-05 PROCEDURE — 99291 CRITICAL CARE FIRST HOUR: CPT

## 2023-12-05 PROCEDURE — 80053 COMPREHEN METABOLIC PANEL: CPT

## 2023-12-05 PROCEDURE — 99291 CRITICAL CARE FIRST HOUR: CPT | Mod: FS,,, | Performed by: INTERNAL MEDICINE

## 2023-12-05 PROCEDURE — 96365 THER/PROPH/DIAG IV INF INIT: CPT

## 2023-12-05 PROCEDURE — 25000242 PHARM REV CODE 250 ALT 637 W/ HCPCS

## 2023-12-05 PROCEDURE — 63600175 PHARM REV CODE 636 W HCPCS: Performed by: INTERNAL MEDICINE

## 2023-12-05 PROCEDURE — 96366 THER/PROPH/DIAG IV INF ADDON: CPT

## 2023-12-05 PROCEDURE — 25000003 PHARM REV CODE 250: Performed by: INTERNAL MEDICINE

## 2023-12-05 PROCEDURE — 84484 ASSAY OF TROPONIN QUANT: CPT

## 2023-12-05 PROCEDURE — 87040 BLOOD CULTURE FOR BACTERIA: CPT | Mod: 59

## 2023-12-05 PROCEDURE — 96375 TX/PRO/DX INJ NEW DRUG ADDON: CPT

## 2023-12-05 PROCEDURE — 87502 INFLUENZA DNA AMP PROBE: CPT

## 2023-12-05 PROCEDURE — 99292 CRITICAL CARE ADDL 30 MIN: CPT | Mod: FS,,, | Performed by: INTERNAL MEDICINE

## 2023-12-05 PROCEDURE — 94640 AIRWAY INHALATION TREATMENT: CPT

## 2023-12-05 PROCEDURE — 96376 TX/PRO/DX INJ SAME DRUG ADON: CPT

## 2023-12-05 PROCEDURE — 99291 PR CRITICAL CARE, E/M 30-74 MINUTES: ICD-10-PCS | Mod: FS,,, | Performed by: INTERNAL MEDICINE

## 2023-12-05 PROCEDURE — 83880 ASSAY OF NATRIURETIC PEPTIDE: CPT

## 2023-12-05 PROCEDURE — 93010 EKG 12-LEAD: ICD-10-PCS | Mod: ,,, | Performed by: INTERNAL MEDICINE

## 2023-12-05 PROCEDURE — 85060 BLOOD SMEAR INTERPRETATION: CPT | Mod: ,,, | Performed by: PATHOLOGY

## 2023-12-05 PROCEDURE — 99292 PR CRITICAL CARE, ADDL 30 MIN: ICD-10-PCS | Mod: FS,,, | Performed by: INTERNAL MEDICINE

## 2023-12-05 PROCEDURE — 27100171 HC OXYGEN HIGH FLOW UP TO 24 HOURS

## 2023-12-05 PROCEDURE — 93010 ELECTROCARDIOGRAM REPORT: CPT | Mod: ,,, | Performed by: INTERNAL MEDICINE

## 2023-12-05 PROCEDURE — 85060 PATHOLOGIST REVIEW: ICD-10-PCS | Mod: ,,, | Performed by: PATHOLOGY

## 2023-12-05 PROCEDURE — 63600175 PHARM REV CODE 636 W HCPCS

## 2023-12-05 PROCEDURE — 94761 N-INVAS EAR/PLS OXIMETRY MLT: CPT | Mod: XB

## 2023-12-05 PROCEDURE — 82803 BLOOD GASES ANY COMBINATION: CPT

## 2023-12-05 PROCEDURE — 87389 HIV-1 AG W/HIV-1&-2 AB AG IA: CPT | Performed by: PHYSICIAN ASSISTANT

## 2023-12-05 PROCEDURE — 85027 COMPLETE CBC AUTOMATED: CPT

## 2023-12-05 PROCEDURE — 94660 CPAP INITIATION&MGMT: CPT

## 2023-12-05 PROCEDURE — 20000000 HC ICU ROOM

## 2023-12-05 PROCEDURE — 99900035 HC TECH TIME PER 15 MIN (STAT)

## 2023-12-05 PROCEDURE — U0002 COVID-19 LAB TEST NON-CDC: HCPCS

## 2023-12-05 PROCEDURE — 85007 BL SMEAR W/DIFF WBC COUNT: CPT

## 2023-12-05 PROCEDURE — 86803 HEPATITIS C AB TEST: CPT | Performed by: PHYSICIAN ASSISTANT

## 2023-12-05 PROCEDURE — 63600175 PHARM REV CODE 636 W HCPCS: Performed by: EMERGENCY MEDICINE

## 2023-12-05 PROCEDURE — 93005 ELECTROCARDIOGRAM TRACING: CPT

## 2023-12-05 PROCEDURE — 85610 PROTHROMBIN TIME: CPT

## 2023-12-05 PROCEDURE — 83735 ASSAY OF MAGNESIUM: CPT

## 2023-12-05 RX ORDER — LORAZEPAM 2 MG/ML
0.5 INJECTION INTRAMUSCULAR EVERY 6 HOURS PRN
Status: DISCONTINUED | OUTPATIENT
Start: 2023-12-05 | End: 2023-12-06

## 2023-12-05 RX ORDER — IPRATROPIUM BROMIDE AND ALBUTEROL SULFATE 2.5; .5 MG/3ML; MG/3ML
3 SOLUTION RESPIRATORY (INHALATION) EVERY 4 HOURS
Status: DISCONTINUED | OUTPATIENT
Start: 2023-12-06 | End: 2023-12-07

## 2023-12-05 RX ORDER — SODIUM CHLORIDE 0.9 % (FLUSH) 0.9 %
10 SYRINGE (ML) INJECTION
Status: DISCONTINUED | OUTPATIENT
Start: 2023-12-05 | End: 2023-12-20 | Stop reason: HOSPADM

## 2023-12-05 RX ORDER — LORAZEPAM 2 MG/ML
1 INJECTION INTRAMUSCULAR
Status: COMPLETED | OUTPATIENT
Start: 2023-12-05 | End: 2023-12-05

## 2023-12-05 RX ORDER — MAGNESIUM SULFATE HEPTAHYDRATE 40 MG/ML
4 INJECTION, SOLUTION INTRAVENOUS
Status: DISCONTINUED | OUTPATIENT
Start: 2023-12-05 | End: 2023-12-12

## 2023-12-05 RX ORDER — MUPIROCIN 20 MG/G
OINTMENT TOPICAL 2 TIMES DAILY
Status: DISPENSED | OUTPATIENT
Start: 2023-12-05 | End: 2023-12-10

## 2023-12-05 RX ORDER — CALCIUM GLUCONATE 20 MG/ML
2 INJECTION, SOLUTION INTRAVENOUS
Status: DISCONTINUED | OUTPATIENT
Start: 2023-12-05 | End: 2023-12-12

## 2023-12-05 RX ORDER — IPRATROPIUM BROMIDE AND ALBUTEROL SULFATE 2.5; .5 MG/3ML; MG/3ML
3 SOLUTION RESPIRATORY (INHALATION)
Status: DISCONTINUED | OUTPATIENT
Start: 2023-12-05 | End: 2023-12-05

## 2023-12-05 RX ORDER — MORPHINE SULFATE 2 MG/ML
2 INJECTION, SOLUTION INTRAMUSCULAR; INTRAVENOUS EVERY 4 HOURS PRN
Status: DISCONTINUED | OUTPATIENT
Start: 2023-12-05 | End: 2023-12-05

## 2023-12-05 RX ORDER — TALC
6 POWDER (GRAM) TOPICAL NIGHTLY PRN
Status: DISCONTINUED | OUTPATIENT
Start: 2023-12-05 | End: 2023-12-20 | Stop reason: HOSPADM

## 2023-12-05 RX ORDER — ONDANSETRON 2 MG/ML
4 INJECTION INTRAMUSCULAR; INTRAVENOUS EVERY 8 HOURS PRN
Status: DISCONTINUED | OUTPATIENT
Start: 2023-12-05 | End: 2023-12-20 | Stop reason: HOSPADM

## 2023-12-05 RX ORDER — CALCIUM GLUCONATE 20 MG/ML
3 INJECTION, SOLUTION INTRAVENOUS
Status: DISCONTINUED | OUTPATIENT
Start: 2023-12-05 | End: 2023-12-12

## 2023-12-05 RX ORDER — LEVOFLOXACIN 5 MG/ML
750 INJECTION, SOLUTION INTRAVENOUS
Status: DISCONTINUED | OUTPATIENT
Start: 2023-12-05 | End: 2023-12-05

## 2023-12-05 RX ORDER — MAGNESIUM SULFATE HEPTAHYDRATE 40 MG/ML
2 INJECTION, SOLUTION INTRAVENOUS
Status: DISCONTINUED | OUTPATIENT
Start: 2023-12-05 | End: 2023-12-12

## 2023-12-05 RX ORDER — POTASSIUM CHLORIDE 7.45 MG/ML
60 INJECTION INTRAVENOUS
Status: DISCONTINUED | OUTPATIENT
Start: 2023-12-05 | End: 2023-12-12

## 2023-12-05 RX ORDER — CALCIUM GLUCONATE 20 MG/ML
1 INJECTION, SOLUTION INTRAVENOUS
Status: DISCONTINUED | OUTPATIENT
Start: 2023-12-05 | End: 2023-12-12

## 2023-12-05 RX ORDER — ENOXAPARIN SODIUM 100 MG/ML
40 INJECTION SUBCUTANEOUS EVERY 24 HOURS
Status: DISCONTINUED | OUTPATIENT
Start: 2023-12-06 | End: 2023-12-09

## 2023-12-05 RX ORDER — MORPHINE SULFATE 2 MG/ML
2 INJECTION, SOLUTION INTRAMUSCULAR; INTRAVENOUS
Status: DISCONTINUED | OUTPATIENT
Start: 2023-12-05 | End: 2023-12-10

## 2023-12-05 RX ORDER — PANTOPRAZOLE SODIUM 40 MG/10ML
40 INJECTION, POWDER, LYOPHILIZED, FOR SOLUTION INTRAVENOUS DAILY
Status: DISCONTINUED | OUTPATIENT
Start: 2023-12-06 | End: 2023-12-05

## 2023-12-05 RX ORDER — POTASSIUM CHLORIDE 7.45 MG/ML
40 INJECTION INTRAVENOUS
Status: DISCONTINUED | OUTPATIENT
Start: 2023-12-05 | End: 2023-12-12

## 2023-12-05 RX ORDER — METHYLPREDNISOLONE SOD SUCC 125 MG
125 VIAL (EA) INJECTION
Status: COMPLETED | OUTPATIENT
Start: 2023-12-05 | End: 2023-12-05

## 2023-12-05 RX ORDER — METHYLPREDNISOLONE SOD SUCC 125 MG
125 VIAL (EA) INJECTION DAILY
Status: DISCONTINUED | OUTPATIENT
Start: 2023-12-06 | End: 2023-12-05

## 2023-12-05 RX ORDER — IPRATROPIUM BROMIDE AND ALBUTEROL SULFATE 2.5; .5 MG/3ML; MG/3ML
3 SOLUTION RESPIRATORY (INHALATION)
Status: COMPLETED | OUTPATIENT
Start: 2023-12-05 | End: 2023-12-05

## 2023-12-05 RX ORDER — LABETALOL HCL 20 MG/4 ML
10 SYRINGE (ML) INTRAVENOUS EVERY 4 HOURS PRN
Status: DISCONTINUED | OUTPATIENT
Start: 2023-12-05 | End: 2023-12-12

## 2023-12-05 RX ORDER — POTASSIUM CHLORIDE 7.45 MG/ML
80 INJECTION INTRAVENOUS
Status: DISCONTINUED | OUTPATIENT
Start: 2023-12-05 | End: 2023-12-12

## 2023-12-05 RX ADMIN — LORAZEPAM 0.5 MG: 2 INJECTION INTRAMUSCULAR; INTRAVENOUS at 07:12

## 2023-12-05 RX ADMIN — LORAZEPAM 1 MG: 2 INJECTION INTRAMUSCULAR; INTRAVENOUS at 04:12

## 2023-12-05 RX ADMIN — IPRATROPIUM BROMIDE AND ALBUTEROL SULFATE 3 ML: .5; 3 SOLUTION RESPIRATORY (INHALATION) at 03:12

## 2023-12-05 RX ADMIN — METHYLPREDNISOLONE SODIUM SUCCINATE 125 MG: 125 INJECTION, POWDER, FOR SOLUTION INTRAMUSCULAR; INTRAVENOUS at 03:12

## 2023-12-05 RX ADMIN — IPRATROPIUM BROMIDE AND ALBUTEROL SULFATE 3 ML: .5; 3 SOLUTION RESPIRATORY (INHALATION) at 07:12

## 2023-12-05 RX ADMIN — MUPIROCIN: 20 OINTMENT TOPICAL at 11:12

## 2023-12-05 RX ADMIN — LEVOFLOXACIN 750 MG: 750 INJECTION, SOLUTION INTRAVENOUS at 04:12

## 2023-12-05 RX ADMIN — AZITHROMYCIN MONOHYDRATE 500 MG: 500 INJECTION, POWDER, LYOPHILIZED, FOR SOLUTION INTRAVENOUS at 09:12

## 2023-12-05 RX ADMIN — LORAZEPAM 1 MG: 2 INJECTION INTRAMUSCULAR; INTRAVENOUS at 06:12

## 2023-12-05 RX ADMIN — IPRATROPIUM BROMIDE AND ALBUTEROL SULFATE 3 ML: .5; 3 SOLUTION RESPIRATORY (INHALATION) at 11:12

## 2023-12-05 NOTE — ED NOTES
Assumed care of the patient. Report received from SHANNON Gorman. Pt on continuous cardiac monitoring, continuous pulse oximetry, and automatic BP cuff cycling Q15min. Pt in hospital gown, side rails up X2, bed low and locked, and call light is placed within reach. One family/visitors at bedside at this time. Pt denies any complaints or needs.

## 2023-12-05 NOTE — ED PROVIDER NOTES
Encounter Date: 12/5/2023       History     Chief Complaint   Patient presents with    Shortness of Breath     88% on 5L O2     78-year-old male significant medical history of CAD, hyperlipidemia, hypertension and COPD on 5 L of O2 at home presents emergency department due to progressive shortness of breath.  Patient reports symptoms began yesterday after increased activity around the house.  His daughter is present however reports symptoms appear to have progressed over the last week.  Patient experiences dyspnea on exertion.  Patient quit smoking 9 years ago.  He reports a nonproductive cough.  No fever chills nausea or vomiting.  No chest pain      Review of patient's allergies indicates:   Allergen Reactions    Lisinopril Edema    Penicillin g sodium      Other reaction(s): unknown. was told by his mother.    Penicillins     Sulfa (sulfonamide antibiotics)      Past Medical History:   Diagnosis Date    Colon polyp     COPD (chronic obstructive pulmonary disease)     Coronary artery disease     Hyperlipidemia     Hypertension     On home oxygen therapy     Pneumonia due to COVID-19 virus 12/03/2020 12/3/2020    Tobacco abuse     1 PPD X 59 Yrs    Vertigo      Past Surgical History:   Procedure Laterality Date    by pass on leg       CARDIAC CATHETERIZATION      cardiac stents       COLONOSCOPY N/A 9/14/2017    Procedure: COLONOSCOPY;  Surgeon: Robb Yepez MD;  Location: [facility];  Service: Endoscopy;  Laterality: N/A; repeat in 5 years    UPPER GASTROINTESTINAL ENDOSCOPY  09/14/2017    Dr. Yepez     Family History   Problem Relation Age of Onset    Cancer Mother     Emphysema Mother     Colon cancer Mother         unsure of age of diagnosis    Cataracts Mother     Heart disease Father     Cataracts Maternal Aunt     Crohn's disease Neg Hx     Ulcerative colitis Neg Hx     Stomach cancer Neg Hx     Esophageal cancer Neg Hx     Glaucoma Neg Hx     Retinal detachment Neg Hx     Macular degeneration Neg  Hx     Strabismus Neg Hx      Social History     Tobacco Use    Smoking status: Former     Current packs/day: 0.00     Types: Cigarettes     Quit date: 9/3/2015     Years since quittin.2    Smokeless tobacco: Never   Substance Use Topics    Alcohol use: Yes     Alcohol/week: 0.0 standard drinks of alcohol     Comment: rarely     Drug use: No     Review of Systems  See HPI  Physical Exam     Initial Vitals [23 1346]   BP Pulse Resp Temp SpO2   (!) 202/89 110 (!) 32 98.7 °F (37.1 °C) (!) 87 %      MAP       --         Physical Exam    Vitals reviewed.  Constitutional:   Thin-appearing male alert orientated but appears in discomfort   HENT:   Head: Normocephalic and atraumatic.   Eyes: Conjunctivae and EOM are normal.   Neck:   Normal range of motion.  Cardiovascular:  Normal rate and normal heart sounds.           Pulmonary/Chest: No respiratory distress. He has wheezes. He has no rales.   Patient with labored breathing using his accessory muscles  Appears to be revealing excessive effort    Abdominal: He exhibits no distension.   Musculoskeletal:         General: Normal range of motion.      Cervical back: Normal range of motion.     Neurological: He is alert and oriented to person, place, and time.   A&O x4   Skin: Skin is warm and dry.   Psychiatric: He has a normal mood and affect. Thought content normal.         ED Course   Procedures  Labs Reviewed   CBC W/ AUTO DIFFERENTIAL - Abnormal; Notable for the following components:       Result Value    WBC 22.99 (*)     RDW 15.2 (*)     All other components within normal limits   COMPREHENSIVE METABOLIC PANEL - Abnormal; Notable for the following components:    CO2 20 (*)     Glucose 120 (*)     All other components within normal limits   INFLUENZA A & B BY MOLECULAR   HIV 1 / 2 ANTIBODY    Narrative:     Release to patient->Immediate   HEPATITIS C ANTIBODY    Narrative:     Release to patient->Immediate   TROPONIN I   B-TYPE NATRIURETIC PEPTIDE   SARS-COV-2  RNA AMPLIFICATION, QUAL   MAGNESIUM   PROTIME-INR   MAGNESIUM    Narrative:     Add on MG per Dr. Ledesma @ 18:24 pm to order # [id number]   ISTAT PROCEDURE        ECG Results              EKG 12-lead (Final result)  Result time 12/05/23 19:21:03      Final result by Interface, Lab In [facility] (12/05/23 19:21:03)                   Narrative:    Test Reason : R06.02,    Vent. Rate : 107 BPM     Atrial Rate : 105 BPM     P-R Int : 150 ms          QRS Dur : 134 ms      QT Int : 362 ms       P-R-T Axes : 085 103 075 degrees     QTc Int : 483 ms    Poor data quality  probably sinus rhythm  Right bundle branch block  Abnormal ECG  When compared with ECG of 13-JUL-2023 14:16,  Poor data quality in current ECG precludes serial comparison  Confirmed by CASSY ROCHE MD ([id number]) on 12/5/2023 7:20:55 PM    Referred By: [email or web address]   SELF           Confirmed By:CASSY ROCHE MD                                  Imaging Results              X-Ray Chest 1 View (Final result)  Result time 12/05/23 16:39:22      Final result by Chris Babb MD (12/05/23 16:39:22)                   Impression:      1. Chronic appearing interstitial findings, superimposed interstitial edema is a consideration.  Correlation with any history of COPD/emphysema.      Electronically signed by: Chris Babb MD  Date:    12/05/2023  Time:    16:39               Narrative:    EXAMINATION:  XR CHEST 1 VIEW    CLINICAL HISTORY:  shortness of breath;    TECHNIQUE:  Single frontal view of the chest was performed.    COMPARISON:  02/28/2022    FINDINGS:  The cardiomediastinal silhouette is not enlarged.  There is no pleural effusion.  The trachea is midline.  The lungs are symmetrically expanded bilaterally with coarse interstitial attenuation bilaterally.  No large focal consolidation seen.  There is no pneumothorax.  The osseous structures are remarkable for degenerative change..                                       Medications   sodium chloride 0.9% flush  10 mL (has no administration in time range)   potassium chloride 10 mEq in 100 mL IVPB (has no administration in time range)     And   potassium chloride 10 mEq in 100 mL IVPB (has no administration in time range)     And   potassium chloride 10 mEq in 100 mL IVPB (has no administration in time range)   magnesium sulfate 2g in water 50mL IVPB (premix) (has no administration in time range)   magnesium sulfate 2g in water 50mL IVPB (premix) (has no administration in time range)   sodium phosphate 15 mmol in dextrose 5 % (D5W) 250 mL IVPB (has no administration in time range)   sodium phosphate 20.01 mmol in dextrose 5 % (D5W) 250 mL IVPB (has no administration in time range)   sodium phosphate 30 mmol in dextrose 5 % (D5W) 250 mL IVPB (has no administration in time range)   calcium gluconate 1 g in NS IVPB (premixed) (has no administration in time range)   calcium gluconate 1 g in NS IVPB (premixed) (has no administration in time range)   calcium gluconate 1 g in NS IVPB (premixed) (has no administration in time range)   ondansetron injection 4 mg (has no administration in time range)   melatonin tablet 6 mg (has no administration in time range)   enoxaparin injection 40 mg (has no administration in time range)   albuterol-ipratropium 2.5 mg-0.5 mg/3 mL nebulizer solution 3 mL (3 mLs Nebulization Given 12/6/23 1241)   morphine injection 2 mg (2 mg Intravenous Given 12/6/23 1309)   methylPREDNISolone sodium succinate injection 40 mg (has no administration in time range)   labetalol 20 mg/4 mL (5 mg/mL) IV syring (10 mg Intravenous Given 12/6/23 1309)   mupirocin 2 % ointment ( Nasal Given 12/6/23 0809)   levoFLOXacin 750 mg/150 mL IVPB 750 mg (has no administration in time range)   ALPRAZolam tablet 1 mg (1 mg Oral Given 12/6/23 0917)   losartan tablet 25 mg (25 mg Oral Given 12/6/23 1142)   albuterol-ipratropium 2.5 mg-0.5 mg/3 mL nebulizer solution 3 mL (3 mLs Nebulization Given 12/5/23 1534)   methylPREDNISolone  "sodium succinate injection 125 mg (125 mg Intravenous Given 12/5/23 1539)   LORazepam injection 1 mg (1 mg Intravenous Given 12/5/23 1608)   LORazepam injection 1 mg (1 mg Intravenous Given 12/5/23 1805)     Medical Decision Making  78-year-old male with significant history of COPD on at home O2 presents to emergency department due to concern of dyspnea on exertion along with labored breathing has been progressive over the last week.    Patient placed initially on 6 L of O2 in the emergency department before deciding to 70s spontaneously in ED room.  Patient VBG without hypercapnia or hypoxia however what he was BiPAP to decreased respiratory effort.  Differential diagnosis also includes COPD exacerbation, viral syndrome, superimposed infection.  CXR:  Chronic appearing interstitial findings, superimposed interstitial edema is a consideration  Patient given IV antibiotics  Place consult to critical care and discussed patient presentation after evaluating the patient we agreed patient be transferred to medical ICU.  Pt discussed with supervising physician      Amount and/or Complexity of Data Reviewed  Labs: ordered.  Radiology: ordered.    Risk  Prescription drug management.  Decision regarding hospitalization.               ED Course as of 12/06/23 1330   Tue Dec 05, 2023   1642 Patient transitioned to BiPAP due to respiratory distress.  Examination more consistent with obstructive airway disease with acute exacerbation.  IV antibiotics ordered after review of allergies.  Albuterol x3 ordered.  IV steroids ordered.  Chest x-ray and BNP are not consistent with acute exacerbation of heart failure or new onset heart failure. [DS]   1714 VBG is reassuring chest pain she was is [DS]   1716 VBG is without significant respiratory acidosis.  Chief complaint this time is anxiety.  Patient reports that he can "feel his arms rising up to fight the CPAP mask.  Of note, at this time he is on minimal CPAP settings with a CPAP of " 5.  He is maintaining his pulse oximetry at 94-98% as we wean his FiO2. [DS]      ED Course User Index  [DS] Sessions, Jaxon NUNES MD                           Clinical Impression:  Final diagnoses:  [R06.02] Shortness of breath          ED Disposition Condition    Admit                 Mandi Roman, PA-C  12/06/23 2177

## 2023-12-06 LAB
ALBUMIN SERPL BCP-MCNC: 3.7 G/DL (ref 3.5–5.2)
ALLENS TEST: ABNORMAL
ALP SERPL-CCNC: 103 U/L (ref 55–135)
ALT SERPL W/O P-5'-P-CCNC: 9 U/L (ref 10–44)
ANION GAP SERPL CALC-SCNC: 13 MMOL/L (ref 8–16)
AST SERPL-CCNC: 15 U/L (ref 10–40)
BASOPHILS # BLD AUTO: 0.02 K/UL (ref 0–0.2)
BASOPHILS NFR BLD: 0.1 % (ref 0–1.9)
BILIRUB SERPL-MCNC: 0.4 MG/DL (ref 0.1–1)
BUN SERPL-MCNC: 16 MG/DL (ref 8–23)
CALCIUM SERPL-MCNC: 9.1 MG/DL (ref 8.7–10.5)
CHLORIDE SERPL-SCNC: 105 MMOL/L (ref 95–110)
CO2 SERPL-SCNC: 22 MMOL/L (ref 23–29)
CREAT SERPL-MCNC: 1.1 MG/DL (ref 0.5–1.4)
DIFFERENTIAL METHOD: ABNORMAL
EOSINOPHIL # BLD AUTO: 0 K/UL (ref 0–0.5)
EOSINOPHIL NFR BLD: 0 % (ref 0–8)
ERYTHROCYTE [DISTWIDTH] IN BLOOD BY AUTOMATED COUNT: 15.1 % (ref 11.5–14.5)
EST. GFR  (NO RACE VARIABLE): >60 ML/MIN/1.73 M^2
GLUCOSE SERPL-MCNC: 132 MG/DL (ref 70–110)
HCO3 UR-SCNC: 30.9 MMOL/L (ref 24–28)
HCT VFR BLD AUTO: 41.2 % (ref 40–54)
HGB BLD-MCNC: 13.8 G/DL (ref 14–18)
IMM GRANULOCYTES # BLD AUTO: 0.13 K/UL (ref 0–0.04)
IMM GRANULOCYTES NFR BLD AUTO: 0.8 % (ref 0–0.5)
LYMPHOCYTES # BLD AUTO: 5 K/UL (ref 1–4.8)
LYMPHOCYTES NFR BLD: 31.8 % (ref 18–48)
MAGNESIUM SERPL-MCNC: 1.8 MG/DL (ref 1.6–2.6)
MCH RBC QN AUTO: 29.4 PG (ref 27–31)
MCHC RBC AUTO-ENTMCNC: 33.5 G/DL (ref 32–36)
MCV RBC AUTO: 88 FL (ref 82–98)
MONOCYTES # BLD AUTO: 0.9 K/UL (ref 0.3–1)
MONOCYTES NFR BLD: 5.8 % (ref 4–15)
NEUTROPHILS # BLD AUTO: 9.7 K/UL (ref 1.8–7.7)
NEUTROPHILS NFR BLD: 61.5 % (ref 38–73)
NRBC BLD-RTO: 0 /100 WBC
PATH REV BLD -IMP: NORMAL
PCO2 BLDA: 59.3 MMHG (ref 35–45)
PH SMN: 7.32 [PH] (ref 7.35–7.45)
PHOSPHATE SERPL-MCNC: 3.4 MG/DL (ref 2.7–4.5)
PLATELET # BLD AUTO: 202 K/UL (ref 150–450)
PLATELET BLD QL SMEAR: ABNORMAL
PMV BLD AUTO: 9.6 FL (ref 9.2–12.9)
PO2 BLDA: 56 MMHG (ref 40–60)
POC BE: 5 MMOL/L
POC SATURATED O2: 85 % (ref 95–100)
POC TCO2: 33 MMOL/L (ref 24–29)
POTASSIUM SERPL-SCNC: 4.2 MMOL/L (ref 3.5–5.1)
PROT SERPL-MCNC: 6.8 G/DL (ref 6–8.4)
RBC # BLD AUTO: 4.7 M/UL (ref 4.6–6.2)
SAMPLE: ABNORMAL
SITE: ABNORMAL
SODIUM SERPL-SCNC: 140 MMOL/L (ref 136–145)
WBC # BLD AUTO: 15.73 K/UL (ref 3.9–12.7)

## 2023-12-06 PROCEDURE — 80053 COMPREHEN METABOLIC PANEL: CPT

## 2023-12-06 PROCEDURE — 63600175 PHARM REV CODE 636 W HCPCS

## 2023-12-06 PROCEDURE — 82803 BLOOD GASES ANY COMBINATION: CPT

## 2023-12-06 PROCEDURE — 27100171 HC OXYGEN HIGH FLOW UP TO 24 HOURS

## 2023-12-06 PROCEDURE — 25000242 PHARM REV CODE 250 ALT 637 W/ HCPCS: Performed by: STUDENT IN AN ORGANIZED HEALTH CARE EDUCATION/TRAINING PROGRAM

## 2023-12-06 PROCEDURE — 84100 ASSAY OF PHOSPHORUS: CPT

## 2023-12-06 PROCEDURE — 25000003 PHARM REV CODE 250

## 2023-12-06 PROCEDURE — 94640 AIRWAY INHALATION TREATMENT: CPT

## 2023-12-06 PROCEDURE — 99291 CRITICAL CARE FIRST HOUR: CPT | Mod: GC,,, | Performed by: INTERNAL MEDICINE

## 2023-12-06 PROCEDURE — 85025 COMPLETE CBC W/AUTO DIFF WBC: CPT

## 2023-12-06 PROCEDURE — 99291 PR CRITICAL CARE, E/M 30-74 MINUTES: ICD-10-PCS | Mod: GC,,, | Performed by: INTERNAL MEDICINE

## 2023-12-06 PROCEDURE — 99900035 HC TECH TIME PER 15 MIN (STAT)

## 2023-12-06 PROCEDURE — 94660 CPAP INITIATION&MGMT: CPT

## 2023-12-06 PROCEDURE — 99292 PR CRITICAL CARE, ADDL 30 MIN: ICD-10-PCS | Mod: GC,,, | Performed by: INTERNAL MEDICINE

## 2023-12-06 PROCEDURE — 94761 N-INVAS EAR/PLS OXIMETRY MLT: CPT

## 2023-12-06 PROCEDURE — 99292 CRITICAL CARE ADDL 30 MIN: CPT | Mod: GC,,, | Performed by: INTERNAL MEDICINE

## 2023-12-06 PROCEDURE — 83735 ASSAY OF MAGNESIUM: CPT

## 2023-12-06 PROCEDURE — 63600175 PHARM REV CODE 636 W HCPCS: Performed by: NURSE PRACTITIONER

## 2023-12-06 PROCEDURE — 20000000 HC ICU ROOM

## 2023-12-06 PROCEDURE — 25000242 PHARM REV CODE 250 ALT 637 W/ HCPCS

## 2023-12-06 RX ORDER — BUDESONIDE AND FORMOTEROL FUMARATE DIHYDRATE 160; 4.5 UG/1; UG/1
2 AEROSOL RESPIRATORY (INHALATION) EVERY 12 HOURS
COMMUNITY
Start: 2023-10-26

## 2023-12-06 RX ORDER — ALPRAZOLAM 0.5 MG/1
1 TABLET ORAL 3 TIMES DAILY PRN
Status: DISCONTINUED | OUTPATIENT
Start: 2023-12-06 | End: 2023-12-10

## 2023-12-06 RX ORDER — LOSARTAN POTASSIUM 25 MG/1
25 TABLET ORAL DAILY
Status: DISCONTINUED | OUTPATIENT
Start: 2023-12-06 | End: 2023-12-09

## 2023-12-06 RX ORDER — BUTALBITAL, ACETAMINOPHEN AND CAFFEINE 50; 325; 40 MG/1; MG/1; MG/1
1 TABLET ORAL EVERY 4 HOURS PRN
Status: ON HOLD | COMMUNITY
Start: 2023-11-03 | End: 2023-12-19

## 2023-12-06 RX ORDER — NIFEDIPINE 30 MG/1
30 TABLET, EXTENDED RELEASE ORAL DAILY
Status: DISCONTINUED | OUTPATIENT
Start: 2023-12-06 | End: 2023-12-09

## 2023-12-06 RX ORDER — LEVOFLOXACIN 5 MG/ML
750 INJECTION, SOLUTION INTRAVENOUS
Status: COMPLETED | OUTPATIENT
Start: 2023-12-07 | End: 2023-12-09

## 2023-12-06 RX ORDER — FLUTICASONE PROPIONATE 50 MCG
1 SPRAY, SUSPENSION (ML) NASAL DAILY
COMMUNITY
Start: 2023-10-25

## 2023-12-06 RX ADMIN — IPRATROPIUM BROMIDE AND ALBUTEROL SULFATE 3 ML: .5; 3 SOLUTION RESPIRATORY (INHALATION) at 03:12

## 2023-12-06 RX ADMIN — IPRATROPIUM BROMIDE AND ALBUTEROL SULFATE 3 ML: .5; 3 SOLUTION RESPIRATORY (INHALATION) at 08:12

## 2023-12-06 RX ADMIN — MUPIROCIN: 20 OINTMENT TOPICAL at 09:12

## 2023-12-06 RX ADMIN — LORAZEPAM 0.5 MG: 2 INJECTION INTRAMUSCULAR; INTRAVENOUS at 04:12

## 2023-12-06 RX ADMIN — METHYLPREDNISOLONE SODIUM SUCCINATE 40 MG: 40 INJECTION, POWDER, FOR SOLUTION INTRAMUSCULAR; INTRAVENOUS at 03:12

## 2023-12-06 RX ADMIN — IPRATROPIUM BROMIDE AND ALBUTEROL SULFATE 3 ML: .5; 3 SOLUTION RESPIRATORY (INHALATION) at 07:12

## 2023-12-06 RX ADMIN — LABETALOL HYDROCHLORIDE 10 MG: 5 INJECTION, SOLUTION INTRAVENOUS at 08:12

## 2023-12-06 RX ADMIN — LOSARTAN POTASSIUM 25 MG: 25 TABLET, FILM COATED ORAL at 11:12

## 2023-12-06 RX ADMIN — METHYLPREDNISOLONE SODIUM SUCCINATE 40 MG: 40 INJECTION, POWDER, FOR SOLUTION INTRAMUSCULAR; INTRAVENOUS at 10:12

## 2023-12-06 RX ADMIN — NIFEDIPINE 30 MG: 30 TABLET, FILM COATED, EXTENDED RELEASE ORAL at 05:12

## 2023-12-06 RX ADMIN — MORPHINE SULFATE 2 MG: 2 INJECTION, SOLUTION INTRAMUSCULAR; INTRAVENOUS at 01:12

## 2023-12-06 RX ADMIN — ALPRAZOLAM 1 MG: 0.5 TABLET ORAL at 09:12

## 2023-12-06 RX ADMIN — ENOXAPARIN SODIUM 40 MG: 40 INJECTION SUBCUTANEOUS at 04:12

## 2023-12-06 RX ADMIN — MORPHINE SULFATE 2 MG: 2 INJECTION, SOLUTION INTRAMUSCULAR; INTRAVENOUS at 08:12

## 2023-12-06 RX ADMIN — IPRATROPIUM BROMIDE AND ALBUTEROL SULFATE 3 ML: .5; 3 SOLUTION RESPIRATORY (INHALATION) at 12:12

## 2023-12-06 RX ADMIN — LABETALOL HYDROCHLORIDE 10 MG: 5 INJECTION, SOLUTION INTRAVENOUS at 01:12

## 2023-12-06 RX ADMIN — ALPRAZOLAM 1 MG: 0.5 TABLET ORAL at 04:12

## 2023-12-06 RX ADMIN — MORPHINE SULFATE 2 MG: 2 INJECTION, SOLUTION INTRAMUSCULAR; INTRAVENOUS at 04:12

## 2023-12-06 RX ADMIN — MUPIROCIN: 20 OINTMENT TOPICAL at 08:12

## 2023-12-06 NOTE — PROGRESS NOTES
[facility] - Emergency Dept  Critical Care Medicine  Progress Note    Patient Name: Francois Otero Sr.  MRN: [id number]  Admission Date: 12/5/2023  Hospital Length of Stay: 0 days  Code Status: Full Code  Attending Provider: Rupinder Fraser MD  Primary Care Provider: Carley Kothari MD   Principal Problem: COPD exacerbation    Subjective:     HPI:  78M with PMHx of CAD, hyperlipidemia, hypertension and COPD on 5 L of O2 at home presents to the ED c/o worsening shortness of breath for the last several days worse with exertion. Patient reports chronic dyspnea mostly with exertion, uses oxygen consistently with exertion and at night but does not always use it at rest. Baseline SpO2 88-92%. Reports that for the last several days he has noticed decreased exercise tolerance and worsening shortness of breath. Has prednisone at home for emergencies and reports using prednisone 40mg daily for the last 3 days without improvement in his symptoms. Has had prior COPD exacerbations in the past, and this is similar. Denies having any recent sick contacts. Denies having any other symptoms, including worse cough or fevers. Reports consistent use of his inhalers, but more as rescue inhaler and not daily maintenance inhalers. Pt is prior smoker.     ED course: Afebrile, HDS, tachycardic, requiring BiPAP 10/5 30% and satting >90%. CBC significant for WBC 23k, CMP with mild acidosis, nl renal function, covid/flu/rsv negative, BNP/trop negative, VBG 7.43/37/49/25. CXR with hyperinflated lung, emphysematous changes, no consolidation. Admit to MICU for COPD exacerbation requiring BiPAP.    Hospital/ICU Course:  No notes on file    Past Medical History:   Diagnosis Date    Colon polyp     COPD (chronic obstructive pulmonary disease)     Coronary artery disease     Hyperlipidemia     Hypertension     On home oxygen therapy     Pneumonia due to COVID-19 virus 12/03/2020 12/3/2020    Tobacco abuse     1 PPD X 59 Yrs    Vertigo         Past Surgical History:   Procedure Laterality Date    by pass on leg       CARDIAC CATHETERIZATION      cardiac stents       COLONOSCOPY N/A 2017    Procedure: COLONOSCOPY;  Surgeon: Robb Yepez MD;  Location: [facility];  Service: Endoscopy;  Laterality: N/A; repeat in 5 years    UPPER GASTROINTESTINAL ENDOSCOPY  2017    Dr. Yepez       Review of patient's allergies indicates:   Allergen Reactions    Lisinopril Edema    Penicillin g sodium      Other reaction(s): unknown. was told by his mother.    Penicillins     Sulfa (sulfonamide antibiotics)        Family History       Problem Relation (Age of Onset)    Cancer Mother    Cataracts Mother, Maternal Aunt    Colon cancer Mother    Emphysema Mother    Heart disease Father          Tobacco Use    Smoking status: Former     Current packs/day: 0.00     Types: Cigarettes     Quit date: 9/3/2015     Years since quittin.2    Smokeless tobacco: Never   Substance and Sexual Activity    Alcohol use: Yes     Alcohol/week: 0.0 standard drinks of alcohol     Comment: rarely     Drug use: No    Sexual activity: Not on file      Review of Systems   Constitutional:  Positive for activity change. Negative for diaphoresis and fever.   Respiratory:  Positive for cough, shortness of breath and wheezing.    Cardiovascular:  Negative for chest pain and leg swelling.   Skin:  Negative for rash.   Neurological:  Negative for syncope.   Psychiatric/Behavioral:  Negative for agitation.      Objective:     Vital Signs (Most Recent):  Temp: 98.7 °F (37.1 °C) (23 1346)  Pulse: (!) 113 (23 181)  Resp: (!) 26 (23 181)  BP: (!) 154/75 (23)  SpO2: (!) 94 % (23) Vital Signs (24h Range):  Temp:  [98.7 °F (37.1 °C)] 98.7 °F (37.1 °C)  Pulse:  [107-129] 113  Resp:  [26-32] 26  SpO2:  [87 %-98 %] 94 %  BP: (137-220)/() 154/75   Weight: 52.9 kg (116 lb 9.9 oz)  Body mass index is 21.33 kg/m².      Intake/Output Summary (Last  24 hours) at 12/5/2023 1854  Last data filed at 12/5/2023 1805  Gross per 24 hour   Intake 150 ml   Output --   Net 150 ml        Physical Exam  Vitals and nursing note reviewed.   Constitutional:       General: He is not in acute distress.     Appearance: He is ill-appearing.      Comments: BiPAP in place   HENT:      Head: Atraumatic.   Eyes:      Conjunctiva/sclera: Conjunctivae normal.   Cardiovascular:      Rate and Rhythm: Regular rhythm. Tachycardia present.   Pulmonary:      Comments: Accessory muscle use. Diffuse end expiratory wheeze  Abdominal:      Palpations: Abdomen is soft.      Tenderness: There is no abdominal tenderness.   Musculoskeletal:         General: No swelling or tenderness. Normal range of motion.   Skin:     General: Skin is warm and dry.   Neurological:      General: No focal deficit present.      Mental Status: He is alert and oriented to person, place, and time.   Psychiatric:         Mood and Affect: Mood normal.         Behavior: Behavior normal.        GEN: older man, resting in bed  HEENT: face symmetric, conjunctivae anicteric, MMM  CV: regular rhythm, tachycardic, no audible murmurs  PULM: decreased air movement bilaterally with diffuse wheezing, able to speak in 2-3 word phrases, accessory muscle use  Abd: non-distended, non-tender  Ext: no LE edema  MSK: moving all extremities  Neuro: alert, oriented, answering questions appropriately     Significant Labs:    CBC/Anemia Profile:  Recent Labs   Lab 12/05/23  1519   WBC 22.99*   HGB 15.5   HCT 45.7      MCV 87   RDW 15.2*      Chemistries:  Recent Labs   Lab 12/05/23  1519      K 4.8      CO2 20*   BUN 14   CREATININE 0.9   CALCIUM 10.0   ALBUMIN 4.3   PROT 8.0   BILITOT 0.4   ALKPHOS 122   ALT 11   AST 18     BNP - 37    EKG - sinus tachycardia, RBBB, Qtc 467    CXR - no pleural effusions, no consolidations     ABG  Recent Labs   Lab 12/05/23  1540   PH 7.431   PO2 49   PCO2 37.4   HCO3 24.9   BE 1      Assessment/Plan:     Pulmonary  * COPD exacerbation  Shortness of Breath    Because the patient is experiencing an acute worsening in baseline symptoms (such as cough, dyspnea, and/or sputum production) beyond normal daily variations to an extent that requires a change in therapy, they are in an acute COPD exacerbation.  Patient is is on oxygen at 5 L/min per nasal cannula..  They are currently exhibiting the following signs of a severe exacerbation: accessory respiratory muscle use and paradoxical chest wall movement.  As such, their exacerbation is classified as severe.    Admit to MICU due to his need for continuous BiPAP  Ensure pt is on COPD Pathway and follow Global Initiative for Chronic Obstructive Lung Disease (GOLD) guidelines  Bronchodilators:   - Duonebs q4h PRN  - Continue home LABA or start a LABA ASAP prior to DC  Steroids: Prednisone 40 mg/day PO for 5 days, or IV should patient not be able to tolerate PO  Antibiotics: Levaquin 750 mg daily x 5 days.  Would ideally give CAP coverage with Zithromax  + CTX but penicillin is listed as an allergy  Supplemental oxygen with SpO2 goal > 88%  Wean BiPAP as soon as possible  Smoking cessation education        Cardiac/Vascular  Hypertension  Listed on problem list, but no antihypertensives listed on home meds list.  SBP > 200 on presentation, down to 140s after 2 mg IV Ativan given.    Will start low-dose calcium channel blocker when stable  PRN Hydralazine IV for SBP > 170  Will need close PCP follow-up after DC    Other  History of benzodiazepine use  Anxiety    Prescribed Xanax 0.25 mg QHS PRN for anxiety since at least 2020.  Unable to access  at this time.  Given 1 mg Ativan IV x 2 in ED.    PRN Ativan 0.5 mg q6h PRN for now  Will seek to wean if possible       Critical Care Daily Checklist:    A: Awake: RASS Goal/Actual Goal:    Actual:     B: Spontaneous Breathing Trial Performed?     C: SAT & SBT Coordinated?  N/a                      D:  Delirium: CAM-ICU     E: Early Mobility Performed? Yes   F: Feeding Goal:    Status:     Current Diet Order   Procedures    Diet NPO      AS: Analgesia/Sedation Ativan prn   T: Thromboembolic Prophylaxis lvx   H: HOB > 300 Yes   U: Stress Ulcer Prophylaxis (if needed) ppi   G: Glucose Control ssi   B: Bowel Function     I: Indwelling Catheter (Lines & Akhtar) Necessity PIV   D: De-escalation of Antimicrobials/Pharmacotherapies levaquin    Plan for the day/ETD Wean bipap    Code Status:  Family/Goals of Care: Full Code         Critical secondary to Patient has a condition that poses threat to life and bodily function: COPD exacerbation requiring BiPAP      Critical care was time spent personally by me on the following activities: development of treatment plan with patient or surrogate and bedside caregivers, discussions with consultants, evaluation of patient's response to treatment, examination of patient, ordering and performing treatments and interventions, ordering and review of laboratory studies, ordering and review of radiographic studies, pulse oximetry, re-evaluation of patient's condition. This critical care time did not overlap with that of any other provider or involve time for any procedures.     Nahid Conley MD  Critical Care Medicine  [facility] - Emergency Dept

## 2023-12-06 NOTE — PROGRESS NOTES
Pharmacist Renal Dose Adjustment Note    Francois Otero Sr. is a 78 y.o. male being treated with the medication Levaquin    Patient Data:    Vital Signs (Most Recent):  Temp: 98.5 °F (36.9 °C) (12/05/23 2105)  Pulse: 100 (12/06/23 0359)  Resp: (!) 32 (12/06/23 0359)  BP: (!) 157/63 (12/06/23 0100)  SpO2: 96 % (12/06/23 0359) Vital Signs (72h Range):  Temp:  [98.5 °F (36.9 °C)-98.7 °F (37.1 °C)]   Pulse:  []   Resp:  [20-32]   BP: (126-220)/()   SpO2:  [87 %-98 %]      Recent Labs   Lab 12/05/23  1519   CREATININE 0.9     Serum creatinine: 0.9 mg/dL 12/05/23 1519  Estimated creatinine clearance: 47.4 mL/min    Medication:Levaquin 750 mg IV every 24 hrs will be changed to medication:Levaquin 750 mg IV every 48 hrs    Pharmacist's Name: Stephen Glass  Pharmacist's Extension: [phone]

## 2023-12-06 NOTE — ASSESSMENT & PLAN NOTE
Anxiety    Prescribed Xanax 0.25 mg QHS PRN for anxiety since at least 2020.  Unable to access  at this time.  Given 1 mg Ativan IV x 2 in ED.    PRN Ativan 0.5 mg q6h PRN for now  Will seek to wean if possible

## 2023-12-06 NOTE — CARE UPDATE
Patient arrived from ED on continuous BiPAP with the following settings: BiPAP 10/5  18/30%. No distress noted. Alarms set and functioning. Will continue to monitor.

## 2023-12-06 NOTE — HOSPITAL COURSE
78M with PMHx of CAD, hyperlipidemia, hypertension and COPD on 5 L of O2 at home presents to the ED c/o worsening shortness of breath for the last several days worse with exertion.  Admitted to MICU for COPD exacerbation requiring BiPAP. Started on solumedrol, duonebs, and levaquin. Working to wean BiPAP and transition to Comfort Flow. Palliative Care to be consulted for additional assistance with GOC discussions.

## 2023-12-06 NOTE — HPI
78M with PMHx of CAD, hyperlipidemia, hypertension and COPD on 5 L of O2 at home presents to the ED c/o worsening shortness of breath for the last several days worse with exertion. Patient reports chronic dyspnea mostly with exertion, uses oxygen consistently with exertion and at night but does not always use it at rest. Baseline SpO2 88-92%. Reports that for the last several days he has noticed decreased exercise tolerance and worsening shortness of breath. Has prednisone at home for emergencies and reports using prednisone 40mg daily for the last 3 days without improvement in his symptoms. Has had prior COPD exacerbations in the past, and this is similar. Denies having any recent sick contacts. Denies having any other symptoms, including worse cough or fevers. Reports consistent use of his inhalers, but more as rescue inhaler and not daily maintenance inhalers. Pt is prior smoker.     ED course: Afebrile, HDS, tachycardic, requiring BiPAP 10/5 30% and satting >90%. CBC significant for WBC 23k, CMP with mild acidosis, nl renal function, covid/flu/rsv negative, BNP/trop negative, VBG 7.43/37/49/25. CXR with hyperinflated lung, emphysematous changes, no consolidation. Admit to MICU for COPD exacerbation requiring BiPAP.

## 2023-12-06 NOTE — ASSESSMENT & PLAN NOTE
Shortness of Breath    Because the patient is experiencing an acute worsening in baseline symptoms (such as cough, dyspnea, and/or sputum production) beyond normal daily variations to an extent that requires a change in therapy, they are in an acute COPD exacerbation.  Patient is is on oxygen at 5 L/min per nasal cannula..  They are currently exhibiting the following signs of a severe exacerbation: accessory respiratory muscle use and paradoxical chest wall movement.  As such, their exacerbation is classified as severe.    Admit to MICU due to his need for continuous BiPAP  Ensure pt is on COPD Pathway and follow Global Initiative for Chronic Obstructive Lung Disease (GOLD) guidelines  Bronchodilators:   - Duonebs q4h   - Continue home LABA or start a LABA ASAP prior to DC  Steroids: Prednisone 40 mg/day PO for 5 days, or IV should patient not be able to tolerate PO  Antibiotics: Levaquin 750 mg daily x 5 days.  Would ideally give CAP coverage with Zithromax  + CTX but penicillin is listed as an allergy  Supplemental oxygen with SpO2 goal > 88%  Wean BiPAP as soon as possible  Smoking cessation education

## 2023-12-06 NOTE — ASSESSMENT & PLAN NOTE
Listed on problem list, but no antihypertensives listed on home meds list.  SBP > 200 on presentation, down to 140s after 2 mg IV Ativan given.    Will start low-dose calcium channel blocker when stable  PRN Hydralazine IV for SBP > 170  Will need close PCP follow-up after DC

## 2023-12-06 NOTE — ASSESSMENT & PLAN NOTE
Shortness of Breath    Because the patient is experiencing an acute worsening in baseline symptoms (such as cough, dyspnea, and/or sputum production) beyond normal daily variations to an extent that requires a change in therapy, they are in an acute COPD exacerbation.  Patient is is on oxygen at 5 L/min per nasal cannula..  They are currently exhibiting the following signs of a severe exacerbation: accessory respiratory muscle use and paradoxical chest wall movement.  As such, their exacerbation is classified as severe.    Admit to MICU due to his need for continuous BiPAP  Ensure pt is on COPD Pathway and follow Global Initiative for Chronic Obstructive Lung Disease (GOLD) guidelines  Bronchodilators:   - Duonebs q4h PRN  - Continue home LABA or start a LABA ASAP prior to DC  Steroids: Prednisone 40 mg/day PO for 5 days, or IV should patient not be able to tolerate PO  Antibiotics: Levaquin 750 mg daily x 5 days.  Would ideally give CAP coverage with Zithromax  + CTX but penicillin is listed as an allergy  Supplemental oxygen with SpO2 goal > 88%  Wean BiPAP as soon as possible  Smoking cessation education

## 2023-12-06 NOTE — ASSESSMENT & PLAN NOTE
Listed on problem list, but no antihypertensives listed on home meds list.  SBP > 200 on presentation, down to 140s after 2 mg IV Ativan given.    Losartan 25mg qd  PRN Hydralazine IV for SBP > 170  Will need close PCP follow-up after DC

## 2023-12-06 NOTE — PLAN OF CARE
MICU DAILY GOALS     Family/Goals of care/Code Status   Code Status: Full Code    24H Vital Sign Range  Temp:  [98.1 °F (36.7 °C)-98.7 °F (37.1 °C)]   Pulse:  []   Resp:  [20-33]   BP: (117-220)/()   SpO2:  [87 %-98 %]      Shift Events (include procedures and significant events)   Pt woke up around 0400 anxious, unable to go back to sleep. Respirations were in the 40's, pt became hypertensive and tachycardic, unable to communicate in full sentences. PRN LORazepam injection 0.5mg was administered per indication on MAR. At this time, pt is resting comfortable in bed.    AWAKE RASS: Goal -    Actual -      Restraint necessity: Not necessary   BREATHE SBT: Not intubated    Coordinate A & B, analgesics/sedatives Pain: managed   SAT: Not intubated   Delirium CAM-ICU: Overall CAM-ICU: Negative   Early(intubated/ Progressive (non-intubated) Mobility MOVE Screen (INTUBATED ONLY): Not intubated    Activity: Activity Management: Rolling - L1   Feeding/Nutrition Diet order: Diet/Nutrition Received: NPO,     Thrombus DVT prophylaxis: VTE Required Core Measure: Pharmacological prophylaxis initiated/maintained   HOB Elevation Head of Bed (HOB) Positioning: HOB at 30-45 degrees   Ulcer Prophylaxis GI: yes   Glucose control managed     Skin Skin assessed during: Daily Assessment    Sacrum intact/not altered? Yes  Heels intact/not altered? Yes  Surgical wound? No    [] No Altered Skin Integrity Present    []Prevention Measures Documented    [] Altered Skin Integrity Present or Discovered   [] LDA present in EPIC              [] LDA added in EPIC   [] Wound Image Taken (required on admit,                   transfer/discharge and every Tuesday)    Wound Care Consulted? No    Attending Nurse:     Second RN/Staff Member:    Bowel Function no issues    Indwelling Catheter Necessity            De-escalation Antibiotics Yes       VS and assessment per flow sheet, patient progressing towards goals as tolerated, plan of care  reviewed with family, all concerns addressed, will continue to monitor.

## 2023-12-06 NOTE — SUBJECTIVE & OBJECTIVE
Interval History/Significant Events: NAEON. Afebrile. HDS. Satting well on BiPAP    VBG this morning on BiPAP 7.32/59/56/31. Trial of NC this morning, still with increased WOB. Will continue Bipap for now    WBC improved.    Review of Systems   Constitutional:  Positive for activity change. Negative for diaphoresis and fever.   Respiratory:  Positive for cough, shortness of breath and wheezing.    Cardiovascular:  Negative for chest pain and leg swelling.   Skin:  Negative for rash.   Neurological:  Negative for syncope.   Psychiatric/Behavioral:  Negative for agitation.      Objective:     Vital Signs (Most Recent):  Temp: 97.5 °F (36.4 °C) (12/06/23 0701)  Pulse: 80 (12/06/23 0901)  Resp: (!) 41 (12/06/23 0901)  BP: (!) 175/102 (12/06/23 0901)  SpO2: 95 % (12/06/23 0901) Vital Signs (24h Range):  Temp:  [97.5 °F (36.4 °C)-98.7 °F (37.1 °C)] 97.5 °F (36.4 °C)  Pulse:  [] 80  Resp:  [16-67] 41  SpO2:  [87 %-100 %] 95 %  BP: (117-220)/() 175/102   Weight: 50.4 kg (111 lb 1.8 oz)  Body mass index is 20.32 kg/m².      Intake/Output Summary (Last 24 hours) at 12/6/2023 1113  Last data filed at 12/6/2023 0901  Gross per 24 hour   Intake 399.69 ml   Output 300 ml   Net 99.69 ml          Physical Exam  Vitals and nursing note reviewed.   Constitutional:       General: He is not in acute distress.     Appearance: He is ill-appearing.      Comments: BiPAP in place   HENT:      Head: Atraumatic.      Mouth/Throat:      Mouth: Mucous membranes are dry.   Eyes:      Conjunctiva/sclera: Conjunctivae normal.   Cardiovascular:      Rate and Rhythm: Regular rhythm. Tachycardia present.   Pulmonary:      Comments: Accessory muscle use. Diffuse end expiratory wheeze  Abdominal:      Palpations: Abdomen is soft.      Tenderness: There is no abdominal tenderness.   Musculoskeletal:         General: No swelling or tenderness. Normal range of motion.   Skin:     General: Skin is warm and dry.   Neurological:      General: No  focal deficit present.      Mental Status: He is alert and oriented to person, place, and time.   Psychiatric:         Mood and Affect: Mood normal.         Behavior: Behavior normal.            Vents:  Oxygen Concentration (%): 40 (12/06/23 0901)  Lines/Drains/Airways       Drain  Duration             Male External Urinary Catheter 12/05/23 2200 Small <1 day              Peripheral Intravenous Line  Duration                  Peripheral IV - Single Lumen 12/05/23 1519 20 G Left Antecubital <1 day         Peripheral IV - Single Lumen 12/05/23 2030 20 G Posterior;Right Forearm <1 day                  Significant Labs:    CBC/Anemia Profile:  Recent Labs   Lab 12/05/23  1519 12/06/23  0333   WBC 22.99* 15.73*   HGB 15.5 13.8*   HCT 45.7 41.2    202   MCV 87 88   RDW 15.2* 15.1*        Chemistries:  Recent Labs   Lab 12/05/23  1519 12/06/23  0333    140   K 4.8 4.2    105   CO2 20* 22*   BUN 14 16   CREATININE 0.9 1.1   CALCIUM 10.0 9.1   ALBUMIN 4.3 3.7   PROT 8.0 6.8   BILITOT 0.4 0.4   ALKPHOS 122 103   ALT 11 9*   AST 18 15   MG 1.9 1.8   PHOS  --  3.4       All pertinent labs within the past 24 hours have been reviewed.    Significant Imaging:  I have reviewed all pertinent imaging results/findings within the past 24 hours.

## 2023-12-06 NOTE — CONSULTS
Patient to be admitted to MICU. Please see H&P from today.     Kinsey Ramos MD PGY-3  Critical Care Medicine

## 2023-12-06 NOTE — ED NOTES
Removed BiPAP, replaced with 5L NC for PO water. Pt became tachypnea, SOB, hypoxic 89%. Pt placed on back on Bipap

## 2023-12-06 NOTE — SUBJECTIVE & OBJECTIVE
Past Medical History:   Diagnosis Date    Colon polyp     COPD (chronic obstructive pulmonary disease)     Coronary artery disease     Hyperlipidemia     Hypertension     On home oxygen therapy     Pneumonia due to COVID-19 virus 2020 12/3/2020    Tobacco abuse     1 PPD X 59 Yrs    Vertigo        Past Surgical History:   Procedure Laterality Date    by pass on leg       CARDIAC CATHETERIZATION      cardiac stents       COLONOSCOPY N/A 2017    Procedure: COLONOSCOPY;  Surgeon: Robb Yepez MD;  Location: [facility];  Service: Endoscopy;  Laterality: N/A; repeat in 5 years    UPPER GASTROINTESTINAL ENDOSCOPY  2017    Dr. Yepez       Review of patient's allergies indicates:   Allergen Reactions    Lisinopril Edema    Penicillin g sodium      Other reaction(s): unknown. was told by his mother.    Penicillins     Sulfa (sulfonamide antibiotics)        Family History       Problem Relation (Age of Onset)    Cancer Mother    Cataracts Mother, Maternal Aunt    Colon cancer Mother    Emphysema Mother    Heart disease Father          Tobacco Use    Smoking status: Former     Current packs/day: 0.00     Types: Cigarettes     Quit date: 9/3/2015     Years since quittin.2    Smokeless tobacco: Never   Substance and Sexual Activity    Alcohol use: Yes     Alcohol/week: 0.0 standard drinks of alcohol     Comment: rarely     Drug use: No    Sexual activity: Not on file      Review of Systems   Constitutional:  Positive for activity change. Negative for diaphoresis and fever.   Respiratory:  Positive for cough, shortness of breath and wheezing.    Cardiovascular:  Negative for chest pain and leg swelling.   Skin:  Negative for rash.   Neurological:  Negative for syncope.   Psychiatric/Behavioral:  Negative for agitation.      Objective:     Vital Signs (Most Recent):  Temp: 98.7 °F (37.1 °C) (23 1346)  Pulse: (!) 113 (23 1815)  Resp: (!) 26 (23 1815)  BP: (!) 154/75 (23  1815)  SpO2: (!) 94 % (12/05/23 1815) Vital Signs (24h Range):  Temp:  [98.7 °F (37.1 °C)] 98.7 °F (37.1 °C)  Pulse:  [107-129] 113  Resp:  [26-32] 26  SpO2:  [87 %-98 %] 94 %  BP: (137-220)/() 154/75   Weight: 52.9 kg (116 lb 9.9 oz)  Body mass index is 21.33 kg/m².      Intake/Output Summary (Last 24 hours) at 12/5/2023 1854  Last data filed at 12/5/2023 1805  Gross per 24 hour   Intake 150 ml   Output --   Net 150 ml        Physical Exam  Vitals and nursing note reviewed.   Constitutional:       General: He is not in acute distress.     Appearance: He is ill-appearing.      Comments: BiPAP in place   HENT:      Head: Atraumatic.   Eyes:      Conjunctiva/sclera: Conjunctivae normal.   Cardiovascular:      Rate and Rhythm: Regular rhythm. Tachycardia present.   Pulmonary:      Comments: Accessory muscle use. Diffuse end expiratory wheeze  Abdominal:      Palpations: Abdomen is soft.      Tenderness: There is no abdominal tenderness.   Musculoskeletal:         General: No swelling or tenderness. Normal range of motion.   Skin:     General: Skin is warm and dry.   Neurological:      General: No focal deficit present.      Mental Status: He is alert and oriented to person, place, and time.   Psychiatric:         Mood and Affect: Mood normal.         Behavior: Behavior normal.        GEN: older man, resting in bed  HEENT: face symmetric, conjunctivae anicteric, MMM  CV: regular rhythm, tachycardic, no audible murmurs  PULM: decreased air movement bilaterally with diffuse wheezing, able to speak in 2-3 word phrases, accessory muscle use  Abd: non-distended, non-tender  Ext: no LE edema  MSK: moving all extremities  Neuro: alert, oriented, answering questions appropriately     Significant Labs:    CBC/Anemia Profile:  Recent Labs   Lab 12/05/23  1519   WBC 22.99*   HGB 15.5   HCT 45.7      MCV 87   RDW 15.2*      Chemistries:  Recent Labs   Lab 12/05/23  1519      K 4.8      CO2 20*   BUN 14    CREATININE 0.9   CALCIUM 10.0   ALBUMIN 4.3   PROT 8.0   BILITOT 0.4   ALKPHOS 122   ALT 11   AST 18     BNP - 37    EKG - sinus tachycardia, RBBB, Qtc 467    CXR - no pleural effusions, no consolidations

## 2023-12-06 NOTE — H&P
[facility] - Emergency Dept  Critical Care Medicine  History & Physical    Patient Name: Francois Otero Sr.  MRN: [id number]  Admission Date: 12/5/2023  Hospital Length of Stay: 0 days  Code Status: Full Code  Attending Physician: Rupinder Fraser MD   Primary Care Provider: Carley Kothari MD   Principal Problem: COPD exacerbation    Subjective:     HPI:  78M with PMHx of CAD, hyperlipidemia, hypertension and COPD on 5 L of O2 at home presents to the ED c/o worsening shortness of breath for the last several days worse with exertion. Patient reports chronic dyspnea mostly with exertion, uses oxygen consistently with exertion and at night but does not always use it at rest. Baseline SpO2 88-92%. Reports that for the last several days he has noticed decreased exercise tolerance and worsening shortness of breath. Has prednisone at home for emergencies and reports using prednisone 40mg daily for the last 3 days without improvement in his symptoms. Has had prior COPD exacerbations in the past, and this is similar. Denies having any recent sick contacts. Denies having any other symptoms, including worse cough or fevers. Reports consistent use of his inhalers, but more as rescue inhaler and not daily maintenance inhalers. Pt is prior smoker.     ED course: Afebrile, HDS, tachycardic, requiring BiPAP 10/5 30% and satting >90%. CBC significant for WBC 23k, CMP with mild acidosis, nl renal function, covid/flu/rsv negative, BNP/trop negative, VBG 7.43/37/49/25. CXR with hyperinflated lung, emphysematous changes, no consolidation. Admit to MICU for COPD exacerbation requiring BiPAP.    Hospital/ICU Course:  No notes on file     No new subjective & objective note has been filed under this hospital service since the last note was generated.    Assessment/Plan:     Pulmonary  * COPD exacerbation  Shortness of Breath    Because the patient is experiencing an acute worsening in baseline symptoms (such as cough, dyspnea,  and/or sputum production) beyond normal daily variations to an extent that requires a change in therapy, they are in an acute COPD exacerbation.  Patient is is on oxygen at 5 L/min per nasal cannula..  They are currently exhibiting the following signs of a severe exacerbation: accessory respiratory muscle use and paradoxical chest wall movement.  As such, their exacerbation is classified as severe.    Admit to MICU due to his need for continuous BiPAP  Ensure pt is on COPD Pathway and follow Global Initiative for Chronic Obstructive Lung Disease (GOLD) guidelines  Bronchodilators:   - Duonebs q4h PRN  - Continue home LABA or start a LABA ASAP prior to DC  Steroids: Prednisone 40 mg/day PO for 5 days, or IV should patient not be able to tolerate PO  Antibiotics: Levaquin 750 mg daily x 5 days.  Would ideally give CAP coverage with Zithromax  + CTX but penicillin is listed as an allergy  Supplemental oxygen with SpO2 goal > 88%  Wean BiPAP as soon as possible  Smoking cessation education        Cardiac/Vascular  Hypertension  Listed on problem list, but no antihypertensives listed on home meds list.  SBP > 200 on presentation, down to 140s after 2 mg IV Ativan given.    Will start low-dose calcium channel blocker when stable  PRN Hydralazine IV for SBP > 170  Will need close PCP follow-up after DC    Other  History of benzodiazepine use  Anxiety    Prescribed Xanax 0.25 mg QHS PRN for anxiety since at least 2020.  Unable to access  at this time.  Given 1 mg Ativan IV x 2 in ED.    PRN Ativan 0.5 mg q6h PRN for now  Will seek to wean if possible        Critical Care Daily Checklist:    A: Awake: RASS Goal/Actual Goal:    Actual:     B: Spontaneous Breathing Trial Performed?     C: SAT & SBT Coordinated?  N/A                      D: Delirium: CAM-ICU     E: Early Mobility Performed? No   F: Feeding Goal:    Status:     Current Diet Order   Procedures    Diet NPO      AS: Analgesia/Sedation None   T: Thromboembolic  Prophylaxis Lovenox   H: HOB > 300 Yes   U: Stress Ulcer Prophylaxis (if needed) None   G: Glucose Control 140-180   B: Bowel Function     I: Indwelling Catheter (Lines & Akhtar) Necessity PIV   D: De-escalation of Antimicrobials/Pharmacotherapies None    Plan for the day/ETD Wean oxygen    Code Status:  Family/Goals of Care: Full Code  Cyril     Critical Care Time: 50 minutes  Critical secondary to Patient has a condition that poses threat to life and bodily function: COPD exacerbation    N/A  Family History   Problem Relation Age of Onset    Cancer Mother     Emphysema Mother     Colon cancer Mother         unsure of age of diagnosis    Cataracts Mother     Heart disease Father     Cataracts Maternal Aunt     Crohn's disease Neg Hx     Ulcerative colitis Neg Hx     Stomach cancer Neg Hx     Esophageal cancer Neg Hx     Glaucoma Neg Hx     Retinal detachment Neg Hx     Macular degeneration Neg Hx     Strabismus Neg Hx         Critical care was time spent personally by me on the following activities: development of treatment plan with patient or surrogate and bedside caregivers, discussions with consultants, evaluation of patient's response to treatment, examination of patient, ordering and performing treatments and interventions, ordering and review of laboratory studies, ordering and review of radiographic studies, pulse oximetry, re-evaluation of patient's condition. This critical care time did not overlap with that of any other provider or involve time for any procedures.     Chela Aly DNP  Critical Care Medicine  [facility] - Emergency Dept

## 2023-12-06 NOTE — ASSESSMENT & PLAN NOTE
Anxiety    Prescribed Xanax 0.25 mg QHS PRN for anxiety since at least 2020.  Unable to access  at this time.  Given 1 mg Ativan IV x 2 in ED.    Xanax 1mg prn

## 2023-12-07 LAB
ALBUMIN SERPL BCP-MCNC: 2.9 G/DL (ref 3.5–5.2)
ALLENS TEST: ABNORMAL
ALLENS TEST: ABNORMAL
ALP SERPL-CCNC: 81 U/L (ref 55–135)
ALT SERPL W/O P-5'-P-CCNC: 9 U/L (ref 10–44)
ANION GAP SERPL CALC-SCNC: 10 MMOL/L (ref 8–16)
AST SERPL-CCNC: 13 U/L (ref 10–40)
BASOPHILS # BLD AUTO: 0.04 K/UL (ref 0–0.2)
BASOPHILS NFR BLD: 0.3 % (ref 0–1.9)
BILIRUB SERPL-MCNC: 0.3 MG/DL (ref 0.1–1)
BUN SERPL-MCNC: 23 MG/DL (ref 8–23)
CA-I BLDV-SCNC: 1.13 MMOL/L (ref 1.06–1.42)
CALCIUM SERPL-MCNC: 7.4 MG/DL (ref 8.7–10.5)
CHLORIDE SERPL-SCNC: 111 MMOL/L (ref 95–110)
CO2 SERPL-SCNC: 22 MMOL/L (ref 23–29)
CREAT SERPL-MCNC: 0.8 MG/DL (ref 0.5–1.4)
DELSYS: ABNORMAL
DELSYS: ABNORMAL
DIFFERENTIAL METHOD: ABNORMAL
EOSINOPHIL # BLD AUTO: 0 K/UL (ref 0–0.5)
EOSINOPHIL NFR BLD: 0 % (ref 0–8)
EP: 8
EP: 8
ERYTHROCYTE [DISTWIDTH] IN BLOOD BY AUTOMATED COUNT: 15.3 % (ref 11.5–14.5)
ERYTHROCYTE [SEDIMENTATION RATE] IN BLOOD BY WESTERGREN METHOD: 16 MM/H
ERYTHROCYTE [SEDIMENTATION RATE] IN BLOOD BY WESTERGREN METHOD: 18 MM/H
EST. GFR  (NO RACE VARIABLE): >60 ML/MIN/1.73 M^2
FIO2: 32
FIO2: 34
GLUCOSE SERPL-MCNC: 103 MG/DL (ref 70–110)
HCO3 UR-SCNC: 27.9 MMOL/L (ref 24–28)
HCO3 UR-SCNC: 31 MMOL/L (ref 24–28)
HCT VFR BLD AUTO: 41.8 % (ref 40–54)
HGB BLD-MCNC: 13.3 G/DL (ref 14–18)
IMM GRANULOCYTES # BLD AUTO: 0.11 K/UL (ref 0–0.04)
IMM GRANULOCYTES NFR BLD AUTO: 0.7 % (ref 0–0.5)
IP: 12
IP: 18
LYMPHOCYTES # BLD AUTO: 4.9 K/UL (ref 1–4.8)
LYMPHOCYTES NFR BLD: 30.9 % (ref 18–48)
MAGNESIUM SERPL-MCNC: 1.9 MG/DL (ref 1.6–2.6)
MCH RBC QN AUTO: 31 PG (ref 27–31)
MCHC RBC AUTO-ENTMCNC: 31.8 G/DL (ref 32–36)
MCV RBC AUTO: 97 FL (ref 82–98)
MIN VOL: 12.4
MIN VOL: 8.1
MODE: ABNORMAL
MODE: ABNORMAL
MONOCYTES # BLD AUTO: 0.5 K/UL (ref 0.3–1)
MONOCYTES NFR BLD: 3.2 % (ref 4–15)
NEUTROPHILS # BLD AUTO: 10.2 K/UL (ref 1.8–7.7)
NEUTROPHILS NFR BLD: 64.9 % (ref 38–73)
NRBC BLD-RTO: 0 /100 WBC
PCO2 BLDA: 57.7 MMHG (ref 35–45)
PCO2 BLDA: 66.2 MMHG (ref 35–45)
PH SMN: 7.28 [PH] (ref 7.35–7.45)
PH SMN: 7.29 [PH] (ref 7.35–7.45)
PHOSPHATE SERPL-MCNC: 3.6 MG/DL (ref 2.7–4.5)
PLATELET # BLD AUTO: 174 K/UL (ref 150–450)
PMV BLD AUTO: 10 FL (ref 9.2–12.9)
PO2 BLDA: 29 MMHG (ref 40–60)
PO2 BLDA: 42 MMHG (ref 40–60)
POC BE: 1 MMOL/L
POC BE: 4 MMOL/L
POC SATURATED O2: 46 % (ref 95–100)
POC SATURATED O2: 70 % (ref 95–100)
POC TCO2: 30 MMOL/L (ref 24–29)
POC TCO2: 33 MMOL/L (ref 24–29)
POTASSIUM SERPL-SCNC: 4.1 MMOL/L (ref 3.5–5.1)
PROT SERPL-MCNC: 5.4 G/DL (ref 6–8.4)
RBC # BLD AUTO: 4.29 M/UL (ref 4.6–6.2)
SAMPLE: ABNORMAL
SAMPLE: ABNORMAL
SITE: ABNORMAL
SITE: ABNORMAL
SODIUM SERPL-SCNC: 143 MMOL/L (ref 136–145)
SP02: 96
SP02: 96
SPONT RATE: 19
WBC # BLD AUTO: 15.75 K/UL (ref 3.9–12.7)

## 2023-12-07 PROCEDURE — 25000242 PHARM REV CODE 250 ALT 637 W/ HCPCS

## 2023-12-07 PROCEDURE — 27100171 HC OXYGEN HIGH FLOW UP TO 24 HOURS

## 2023-12-07 PROCEDURE — 83735 ASSAY OF MAGNESIUM: CPT

## 2023-12-07 PROCEDURE — 63600175 PHARM REV CODE 636 W HCPCS

## 2023-12-07 PROCEDURE — 99900035 HC TECH TIME PER 15 MIN (STAT)

## 2023-12-07 PROCEDURE — 84100 ASSAY OF PHOSPHORUS: CPT

## 2023-12-07 PROCEDURE — 94640 AIRWAY INHALATION TREATMENT: CPT

## 2023-12-07 PROCEDURE — 94761 N-INVAS EAR/PLS OXIMETRY MLT: CPT

## 2023-12-07 PROCEDURE — 99291 CRITICAL CARE FIRST HOUR: CPT | Mod: GC,,, | Performed by: INTERNAL MEDICINE

## 2023-12-07 PROCEDURE — 82330 ASSAY OF CALCIUM: CPT | Performed by: INTERNAL MEDICINE

## 2023-12-07 PROCEDURE — 94660 CPAP INITIATION&MGMT: CPT

## 2023-12-07 PROCEDURE — 82803 BLOOD GASES ANY COMBINATION: CPT

## 2023-12-07 PROCEDURE — 99292 CRITICAL CARE ADDL 30 MIN: CPT | Mod: GC,,, | Performed by: INTERNAL MEDICINE

## 2023-12-07 PROCEDURE — 99292 PR CRITICAL CARE, ADDL 30 MIN: ICD-10-PCS | Mod: GC,,, | Performed by: INTERNAL MEDICINE

## 2023-12-07 PROCEDURE — 25000242 PHARM REV CODE 250 ALT 637 W/ HCPCS: Performed by: INTERNAL MEDICINE

## 2023-12-07 PROCEDURE — 85025 COMPLETE CBC W/AUTO DIFF WBC: CPT

## 2023-12-07 PROCEDURE — 25000003 PHARM REV CODE 250

## 2023-12-07 PROCEDURE — C9113 INJ PANTOPRAZOLE SODIUM, VIA: HCPCS

## 2023-12-07 PROCEDURE — 80053 COMPREHEN METABOLIC PANEL: CPT

## 2023-12-07 PROCEDURE — 99291 PR CRITICAL CARE, E/M 30-74 MINUTES: ICD-10-PCS | Mod: GC,,, | Performed by: INTERNAL MEDICINE

## 2023-12-07 PROCEDURE — 20000000 HC ICU ROOM

## 2023-12-07 RX ORDER — LEVALBUTEROL 1.25 MG/.5ML
1.25 SOLUTION, CONCENTRATE RESPIRATORY (INHALATION) EVERY 4 HOURS
Status: DISCONTINUED | OUTPATIENT
Start: 2023-12-07 | End: 2023-12-12

## 2023-12-07 RX ORDER — LORAZEPAM 2 MG/ML
INJECTION INTRAMUSCULAR
Status: COMPLETED
Start: 2023-12-07 | End: 2023-12-07

## 2023-12-07 RX ORDER — FUROSEMIDE 10 MG/ML
60 INJECTION INTRAMUSCULAR; INTRAVENOUS ONCE
Status: COMPLETED | OUTPATIENT
Start: 2023-12-07 | End: 2023-12-07

## 2023-12-07 RX ORDER — MORPHINE SULFATE 2 MG/ML
1 INJECTION, SOLUTION INTRAMUSCULAR; INTRAVENOUS ONCE
Status: COMPLETED | OUTPATIENT
Start: 2023-12-07 | End: 2023-12-07

## 2023-12-07 RX ORDER — IPRATROPIUM BROMIDE 0.5 MG/2.5ML
0.5 SOLUTION RESPIRATORY (INHALATION) EVERY 4 HOURS
Status: DISCONTINUED | OUTPATIENT
Start: 2023-12-07 | End: 2023-12-12

## 2023-12-07 RX ORDER — ALBUTEROL SULFATE 2.5 MG/.5ML
10 SOLUTION RESPIRATORY (INHALATION) ONCE
Status: COMPLETED | OUTPATIENT
Start: 2023-12-07 | End: 2023-12-07

## 2023-12-07 RX ORDER — PANTOPRAZOLE SODIUM 40 MG/10ML
40 INJECTION, POWDER, LYOPHILIZED, FOR SOLUTION INTRAVENOUS DAILY
Status: DISCONTINUED | OUTPATIENT
Start: 2023-12-07 | End: 2023-12-09

## 2023-12-07 RX ORDER — MORPHINE SULFATE 2 MG/ML
INJECTION, SOLUTION INTRAMUSCULAR; INTRAVENOUS
Status: COMPLETED
Start: 2023-12-07 | End: 2023-12-07

## 2023-12-07 RX ORDER — LORAZEPAM 2 MG/ML
1 INJECTION INTRAMUSCULAR ONCE
Status: COMPLETED | OUTPATIENT
Start: 2023-12-07 | End: 2023-12-07

## 2023-12-07 RX ORDER — LEVALBUTEROL 1.25 MG/.5ML
1.25 SOLUTION, CONCENTRATE RESPIRATORY (INHALATION) EVERY 4 HOURS
Status: DISCONTINUED | OUTPATIENT
Start: 2023-12-07 | End: 2023-12-07

## 2023-12-07 RX ORDER — MORPHINE SULFATE 2 MG/ML
1 INJECTION, SOLUTION INTRAMUSCULAR; INTRAVENOUS EVERY 6 HOURS
Status: COMPLETED | OUTPATIENT
Start: 2023-12-07 | End: 2023-12-08

## 2023-12-07 RX ADMIN — MORPHINE SULFATE 1 MG: 2 INJECTION, SOLUTION INTRAMUSCULAR; INTRAVENOUS at 08:12

## 2023-12-07 RX ADMIN — FUROSEMIDE 60 MG: 10 INJECTION, SOLUTION INTRAVENOUS at 12:12

## 2023-12-07 RX ADMIN — IPRATROPIUM BROMIDE AND ALBUTEROL SULFATE 3 ML: .5; 3 SOLUTION RESPIRATORY (INHALATION) at 07:12

## 2023-12-07 RX ADMIN — MUPIROCIN: 20 OINTMENT TOPICAL at 09:12

## 2023-12-07 RX ADMIN — IPRATROPIUM BROMIDE AND ALBUTEROL SULFATE 3 ML: .5; 3 SOLUTION RESPIRATORY (INHALATION) at 12:12

## 2023-12-07 RX ADMIN — IPRATROPIUM BROMIDE AND ALBUTEROL SULFATE 3 ML: .5; 3 SOLUTION RESPIRATORY (INHALATION) at 04:12

## 2023-12-07 RX ADMIN — PANTOPRAZOLE SODIUM 40 MG: 40 INJECTION, POWDER, FOR SOLUTION INTRAVENOUS at 01:12

## 2023-12-07 RX ADMIN — LOSARTAN POTASSIUM 25 MG: 25 TABLET, FILM COATED ORAL at 09:12

## 2023-12-07 RX ADMIN — LORAZEPAM 1 MG: 2 INJECTION INTRAMUSCULAR; INTRAVENOUS at 08:12

## 2023-12-07 RX ADMIN — ENOXAPARIN SODIUM 40 MG: 40 INJECTION SUBCUTANEOUS at 05:12

## 2023-12-07 RX ADMIN — IPRATROPIUM BROMIDE AND ALBUTEROL SULFATE 3 ML: .5; 3 SOLUTION RESPIRATORY (INHALATION) at 03:12

## 2023-12-07 RX ADMIN — MORPHINE SULFATE 1 MG: 2 INJECTION, SOLUTION INTRAMUSCULAR; INTRAVENOUS at 06:12

## 2023-12-07 RX ADMIN — METHYLPREDNISOLONE SODIUM SUCCINATE 40 MG: 40 INJECTION, POWDER, FOR SOLUTION INTRAMUSCULAR; INTRAVENOUS at 01:12

## 2023-12-07 RX ADMIN — NIFEDIPINE 30 MG: 30 TABLET, FILM COATED, EXTENDED RELEASE ORAL at 09:12

## 2023-12-07 RX ADMIN — METHYLPREDNISOLONE SODIUM SUCCINATE 40 MG: 40 INJECTION, POWDER, FOR SOLUTION INTRAMUSCULAR; INTRAVENOUS at 10:12

## 2023-12-07 RX ADMIN — LEVOFLOXACIN 750 MG: 750 INJECTION, SOLUTION INTRAVENOUS at 03:12

## 2023-12-07 RX ADMIN — METHYLPREDNISOLONE SODIUM SUCCINATE 40 MG: 40 INJECTION, POWDER, FOR SOLUTION INTRAMUSCULAR; INTRAVENOUS at 06:12

## 2023-12-07 RX ADMIN — LABETALOL HYDROCHLORIDE 10 MG: 5 INJECTION, SOLUTION INTRAVENOUS at 08:12

## 2023-12-07 RX ADMIN — ALPRAZOLAM 1 MG: 0.5 TABLET ORAL at 05:12

## 2023-12-07 RX ADMIN — IPRATROPIUM BROMIDE 0.5 MG: 0.5 SOLUTION RESPIRATORY (INHALATION) at 07:12

## 2023-12-07 RX ADMIN — ALBUTEROL SULFATE 10 MG: 2.5 SOLUTION RESPIRATORY (INHALATION) at 08:12

## 2023-12-07 RX ADMIN — MORPHINE SULFATE 1 MG: 2 INJECTION, SOLUTION INTRAMUSCULAR; INTRAVENOUS at 12:12

## 2023-12-07 RX ADMIN — LEVALBUTEROL 1.25 MG: 1.25 SOLUTION, CONCENTRATE RESPIRATORY (INHALATION) at 07:12

## 2023-12-07 RX ADMIN — IPRATROPIUM BROMIDE AND ALBUTEROL SULFATE 3 ML: .5; 3 SOLUTION RESPIRATORY (INHALATION) at 11:12

## 2023-12-07 NOTE — PLAN OF CARE
MICU DAILY GOALS     Family/Goals of care/Code Status   Code Status: Full Code    24H Vital Sign Range  Temp:  [97.2 °F (36.2 °C)-98.5 °F (36.9 °C)]   Pulse:  []   Resp:  [15-67]   BP: (119-228)/()   SpO2:  [92 %-100 %]      Shift Events (include procedures and significant events)   No acute events throughout shift    AWAKE RASS: Goal -    Actual -      Restraint necessity: Not necessary   BREATHE SBT: Not intubated    Coordinate A & B, analgesics/sedatives Pain: managed   SAT: Not intubated   Delirium CAM-ICU: Overall CAM-ICU: Negative   Early(intubated/ Progressive (non-intubated) Mobility MOVE Screen (INTUBATED ONLY): Not intubated    Activity: Activity Management: Rolling - L1   Feeding/Nutrition Diet order: Diet/Nutrition Received: NPO,     Thrombus DVT prophylaxis: VTE Required Core Measure: Pharmacological prophylaxis initiated/maintained   HOB Elevation Head of Bed (HOB) Positioning: HOB at 30 degrees   Ulcer Prophylaxis GI: yes   Glucose control managed     Skin Skin assessed during: Daily Assessment    Sacrum intact/not altered? Yes  Heels intact/not altered? Yes  Surgical wound? No    [] No Altered Skin Integrity Present    []Prevention Measures Documented    [] Altered Skin Integrity Present or Discovered   [] LDA present in EPIC              [] LDA added in EPIC   [] Wound Image Taken (required on admit,                   transfer/discharge and every Tuesday)    Wound Care Consulted? No    Attending Nurse:     Second RN/Staff Member:    Bowel Function no issues    Indwelling Catheter Necessity            De-escalation Antibiotics Yes

## 2023-12-07 NOTE — SUBJECTIVE & OBJECTIVE
Interval History/Significant Events: NAEON. Afebrile, HDS    BiPAP requirements increasing this morning, now on 18/8 35% after slightly worsening VBG 7.28/66/29. Pt also saying he is having a difficult time breathing despite continuous bipap for past 36 hours, increasingly frequent duoneb treatments, and solumedrol. Anxiety regarding condition may be contributing somewhat to this. Also adjusted bipap setting to allow for longer expiratory phase. Has significant distress when taken off bipap for PO medications.       Review of Systems   Constitutional:  Positive for activity change. Negative for diaphoresis and fever.   HENT:  Positive for congestion.    Respiratory:  Positive for cough, shortness of breath and wheezing.    Cardiovascular:  Negative for chest pain and leg swelling.   Gastrointestinal:  Negative for abdominal pain, diarrhea, nausea and vomiting.   Genitourinary:  Negative for difficulty urinating.   Skin:  Negative for rash.   Neurological:  Negative for syncope.   Psychiatric/Behavioral:  Negative for agitation.      Objective:     Vital Signs (Most Recent):  Temp: 96.7 °F (35.9 °C) (12/07/23 0700)  Pulse: 82 (12/07/23 0900)  Resp: (!) 26 (12/07/23 0900)  BP: (!) 140/77 (12/07/23 0934)  SpO2: (!) 89 % (12/07/23 0900) Vital Signs (24h Range):  Temp:  [96.7 °F (35.9 °C)-98.5 °F (36.9 °C)] 96.7 °F (35.9 °C)  Pulse:  [74-98] 82  Resp:  [15-55] 26  SpO2:  [89 %-99 %] 89 %  BP: (119-228)/() 140/77   Weight: 50.4 kg (111 lb 1.8 oz)  Body mass index is 20.32 kg/m².      Intake/Output Summary (Last 24 hours) at 12/7/2023 1012  Last data filed at 12/7/2023 0800  Gross per 24 hour   Intake 200 ml   Output 700 ml   Net -500 ml          Physical Exam  Vitals and nursing note reviewed.   Constitutional:       General: He is not in acute distress.     Appearance: He is ill-appearing.      Comments: BiPAP in place   HENT:      Head: Atraumatic.      Mouth/Throat:      Mouth: Mucous membranes are dry.   Eyes:       Conjunctiva/sclera: Conjunctivae normal.   Cardiovascular:      Rate and Rhythm: Normal rate and regular rhythm.   Pulmonary:      Comments: Accessory muscle use. Diffuse end expiratory wheeze  Abdominal:      Palpations: Abdomen is soft.      Tenderness: There is no abdominal tenderness.   Musculoskeletal:         General: No swelling or tenderness. Normal range of motion.   Skin:     General: Skin is warm and dry.   Neurological:      General: No focal deficit present.      Mental Status: He is alert and oriented to person, place, and time.   Psychiatric:         Mood and Affect: Mood normal.         Behavior: Behavior normal.            Vents:  Oxygen Concentration (%): 32 (12/07/23 0900)  Lines/Drains/Airways       Drain  Duration             Male External Urinary Catheter 12/05/23 2200 Small 1 day              Peripheral Intravenous Line  Duration                  Peripheral IV - Single Lumen 12/05/23 2030 20 G Posterior;Right Forearm 1 day         Peripheral IV - Single Lumen 12/07/23 0900 22 G;1 in Anterior;Left;Proximal Forearm <1 day                  Significant Labs:    CBC/Anemia Profile:  Recent Labs   Lab 12/05/23  1519 12/06/23  0333 12/07/23  0435   WBC 22.99* 15.73* 15.75*   HGB 15.5 13.8* 13.3*   HCT 45.7 41.2 41.8    202 174   MCV 87 88 97   RDW 15.2* 15.1* 15.3*        Chemistries:  Recent Labs   Lab 12/05/23  1519 12/06/23  0333 12/07/23  0435    140 143   K 4.8 4.2 4.1    105 111*   CO2 20* 22* 22*   BUN 14 16 23   CREATININE 0.9 1.1 0.8   CALCIUM 10.0 9.1 7.4*   ALBUMIN 4.3 3.7 2.9*   PROT 8.0 6.8 5.4*   BILITOT 0.4 0.4 0.3   ALKPHOS 122 103 81   ALT 11 9* 9*   AST 18 15 13   MG 1.9 1.8 1.9   PHOS  --  3.4 3.6       All pertinent labs within the past 24 hours have been reviewed.    Significant Imaging:  I have reviewed all pertinent imaging results/findings within the past 24 hours.

## 2023-12-07 NOTE — ASSESSMENT & PLAN NOTE
Listed on problem list, but no antihypertensives listed on home meds list.  SBP > 200 on presentation, down to 140s after 2 mg IV Ativan given.    Losartan 25mg qd, nifedipine 30mg qd  PRN labetalol IV for SBP > 170  Will need close PCP follow-up after DC   Secondary Intention Text (Leave Blank If You Do Not Want): The defect will heal with secondary intention.

## 2023-12-07 NOTE — ASSESSMENT & PLAN NOTE
Shortness of Breath    Because the patient is experiencing an acute worsening in baseline symptoms (such as cough, dyspnea, and/or sputum production) beyond normal daily variations to an extent that requires a change in therapy, they are in an acute COPD exacerbation.  Patient is is on oxygen at 5 L/min per nasal cannula..  They are currently exhibiting the following signs of a severe exacerbation: accessory respiratory muscle use and paradoxical chest wall movement.  As such, their exacerbation is classified as severe.    Admit to MICU due to his need for continuous BiPAP  Requiring increasing support, now on 18/8. Desats quickly when bipap removed for PO meds this morning  Trend VBGs, most recent 7.28/66/29  Ensure pt is on COPD Pathway and follow Global Initiative for Chronic Obstructive Lung Disease (GOLD) guidelines  Bronchodilators:   - Duonebs q4h   - Continue home LABA or start a LABA ASAP prior to DC  Steroids: solumedrol 40mg q8h for 2 days, can transition to PO after if required.  Antibiotics: Levaquin 750 mg daily x 5 days.  Would ideally give CAP coverage with Zithromax  + CTX but penicillin is listed as an allergy  Ativan prn for anxiety/dyspnea crisis on BiPAP  Supplemental oxygen with SpO2 goal > 88%  Wean BiPAP as soon as possible  Smoking cessation education

## 2023-12-07 NOTE — PLAN OF CARE
MICU DAILY GOALS     Family/Goals of care/Code Status   Code Status: Full Code    24H Vital Sign Range  Temp:  [97.5 °F (36.4 °C)-98.5 °F (36.9 °C)]   Pulse:  []   Resp:  [15-67]   BP: (117-228)/()   SpO2:  [91 %-100 %]      Shift Events (include procedures and significant events)   Continuous BIPAP, morphine and xanax given for anxiety and agitation.    AWAKE RASS: Goal -    Actual -      Restraint necessity: Not necessary   BREATHE SBT: Not intubated    Coordinate A & B, analgesics/sedatives Pain: managed   SAT: Not intubated   Delirium CAM-ICU: Overall CAM-ICU: Negative   Early(intubated/ Progressive (non-intubated) Mobility MOVE Screen (INTUBATED ONLY): Not intubated    Activity: Activity Management: Rolling - L1   Feeding/Nutrition Diet order: Diet/Nutrition Received: NPO,     Thrombus DVT prophylaxis: VTE Required Core Measure: Pharmacological prophylaxis initiated/maintained   HOB Elevation Head of Bed (HOB) Positioning: HOB elevated   Ulcer Prophylaxis GI: yes   Glucose control managed     Skin Skin assessed during: Daily Assessment    Sacrum intact/not altered? Yes  Heels intact/not altered? Yes  Surgical wound? No    [] No Altered Skin Integrity Present    []Prevention Measures Documented    [] Altered Skin Integrity Present or Discovered   [] LDA present in EPIC              [] LDA added in EPIC   [] Wound Image Taken (required on admit,                   transfer/discharge and every Tuesday)    Wound Care Consulted? No    Attending Nurse:     Second RN/Staff Member:    Bowel Function no issues    Indwelling Catheter Necessity            De-escalation Antibiotics Yes       VS and assessment per flow sheet, patient progressing towards goals as tolerated, plan of care reviewed with family, all concerns addressed, will continue to monitor.

## 2023-12-07 NOTE — PROGRESS NOTES
[facility]  Critical Care Medicine  Progress Note    Patient Name: Francois Otero Sr.  MRN: [id number]  Admission Date: 12/5/2023  Hospital Length of Stay: 2 days  Code Status: Full Code  Attending Provider: Rupinder Fraser MD  Primary Care Provider: Carley Kothari MD   Principal Problem: COPD exacerbation    Subjective:     HPI:  78M with PMHx of CAD, hyperlipidemia, hypertension and COPD on 5 L of O2 at home presents to the ED c/o worsening shortness of breath for the last several days worse with exertion. Patient reports chronic dyspnea mostly with exertion, uses oxygen consistently with exertion and at night but does not always use it at rest. Baseline SpO2 88-92%. Reports that for the last several days he has noticed decreased exercise tolerance and worsening shortness of breath. Has prednisone at home for emergencies and reports using prednisone 40mg daily for the last 3 days without improvement in his symptoms. Has had prior COPD exacerbations in the past, and this is similar. Denies having any recent sick contacts. Denies having any other symptoms, including worse cough or fevers. Reports consistent use of his inhalers, but more as rescue inhaler and not daily maintenance inhalers. Pt is prior smoker.     ED course: Afebrile, HDS, tachycardic, requiring BiPAP 10/5 30% and satting >90%. CBC significant for WBC 23k, CMP with mild acidosis, nl renal function, covid/flu/rsv negative, BNP/trop negative, VBG 7.43/37/49/25. CXR with hyperinflated lung, emphysematous changes, no consolidation. Admit to MICU for COPD exacerbation requiring BiPAP.    Hospital/ICU Course:  78M with PMHx of CAD, hyperlipidemia, hypertension and COPD on 5 L of O2 at home presents to the ED c/o worsening shortness of breath for the last several days worse with exertion.  Admitted to MICU for COPD exacerbation requiring BiPAP. Started on solumedrol, duonebs, and levaquin. Working to wean BiPAP    Interval  History/Significant Events: NAEON. Afebrile, HDS    BiPAP requirements increasing this morning, now on 18/8 35% after slightly worsening VBG 7.28/66/29. Pt also saying he is having a difficult time breathing despite continuous bipap for past 36 hours, increasingly frequent duoneb treatments, and solumedrol. Anxiety regarding condition may be contributing somewhat to this. Also adjusted bipap setting to allow for longer expiratory phase. Has significant distress when taken off bipap for PO medications.       Review of Systems   Constitutional:  Positive for activity change. Negative for diaphoresis and fever.   HENT:  Positive for congestion.    Respiratory:  Positive for cough, shortness of breath and wheezing.    Cardiovascular:  Negative for chest pain and leg swelling.   Gastrointestinal:  Negative for abdominal pain, diarrhea, nausea and vomiting.   Genitourinary:  Negative for difficulty urinating.   Skin:  Negative for rash.   Neurological:  Negative for syncope.   Psychiatric/Behavioral:  Negative for agitation.      Objective:     Vital Signs (Most Recent):  Temp: 96.7 °F (35.9 °C) (12/07/23 0700)  Pulse: 82 (12/07/23 0900)  Resp: (!) 26 (12/07/23 0900)  BP: (!) 140/77 (12/07/23 0934)  SpO2: (!) 89 % (12/07/23 0900) Vital Signs (24h Range):  Temp:  [96.7 °F (35.9 °C)-98.5 °F (36.9 °C)] 96.7 °F (35.9 °C)  Pulse:  [74-98] 82  Resp:  [15-55] 26  SpO2:  [89 %-99 %] 89 %  BP: (119-228)/() 140/77   Weight: 50.4 kg (111 lb 1.8 oz)  Body mass index is 20.32 kg/m².      Intake/Output Summary (Last 24 hours) at 12/7/2023 1012  Last data filed at 12/7/2023 0800  Gross per 24 hour   Intake 200 ml   Output 700 ml   Net -500 ml          Physical Exam  Vitals and nursing note reviewed.   Constitutional:       General: He is not in acute distress.     Appearance: He is ill-appearing.      Comments: BiPAP in place   HENT:      Head: Atraumatic.      Mouth/Throat:      Mouth: Mucous membranes are dry.   Eyes:       Conjunctiva/sclera: Conjunctivae normal.   Cardiovascular:      Rate and Rhythm: Normal rate and regular rhythm.   Pulmonary:      Comments: Accessory muscle use. Diffuse end expiratory wheeze  Abdominal:      Palpations: Abdomen is soft.      Tenderness: There is no abdominal tenderness.   Musculoskeletal:         General: No swelling or tenderness. Normal range of motion.   Skin:     General: Skin is warm and dry.   Neurological:      General: No focal deficit present.      Mental Status: He is alert and oriented to person, place, and time.   Psychiatric:         Mood and Affect: Mood normal.         Behavior: Behavior normal.            Vents:  Oxygen Concentration (%): 32 (12/07/23 0900)  Lines/Drains/Airways       Drain  Duration             Male External Urinary Catheter 12/05/23 2200 Small 1 day              Peripheral Intravenous Line  Duration                  Peripheral IV - Single Lumen 12/05/23 2030 20 G Posterior;Right Forearm 1 day         Peripheral IV - Single Lumen 12/07/23 0900 22 G;1 in Anterior;Left;Proximal Forearm <1 day                  Significant Labs:    CBC/Anemia Profile:  Recent Labs   Lab 12/05/23  1519 12/06/23  0333 12/07/23  0435   WBC 22.99* 15.73* 15.75*   HGB 15.5 13.8* 13.3*   HCT 45.7 41.2 41.8    202 174   MCV 87 88 97   RDW 15.2* 15.1* 15.3*        Chemistries:  Recent Labs   Lab 12/05/23  1519 12/06/23  0333 12/07/23  0435    140 143   K 4.8 4.2 4.1    105 111*   CO2 20* 22* 22*   BUN 14 16 23   CREATININE 0.9 1.1 0.8   CALCIUM 10.0 9.1 7.4*   ALBUMIN 4.3 3.7 2.9*   PROT 8.0 6.8 5.4*   BILITOT 0.4 0.4 0.3   ALKPHOS 122 103 81   ALT 11 9* 9*   AST 18 15 13   MG 1.9 1.8 1.9   PHOS  --  3.4 3.6       All pertinent labs within the past 24 hours have been reviewed.    Significant Imaging:  I have reviewed all pertinent imaging results/findings within the past 24 hours.    ABG  Recent Labs   Lab 12/07/23  0515   PH 7.278*   PO2 29*   PCO2 66.2*   HCO3 31.0*   BE  4*     Assessment/Plan:     Pulmonary  * COPD exacerbation  Shortness of Breath    Because the patient is experiencing an acute worsening in baseline symptoms (such as cough, dyspnea, and/or sputum production) beyond normal daily variations to an extent that requires a change in therapy, they are in an acute COPD exacerbation.  Patient is is on oxygen at 5 L/min per nasal cannula..  They are currently exhibiting the following signs of a severe exacerbation: accessory respiratory muscle use and paradoxical chest wall movement.  As such, their exacerbation is classified as severe.    Admit to MICU due to his need for continuous BiPAP  Requiring increasing support, now on 18/8. Desats quickly when bipap removed for PO meds this morning  Trend VBGs, most recent 7.28/66/29  Ensure pt is on COPD Pathway and follow Global Initiative for Chronic Obstructive Lung Disease (GOLD) guidelines  Bronchodilators:   - Duonebs q4h   - Continue home LABA or start a LABA ASAP prior to DC  Steroids: solumedrol 40mg q8h for 2 days, can transition to PO after if required.  Antibiotics: Levaquin 750 mg daily x 5 days.  Would ideally give CAP coverage with Zithromax  + CTX but penicillin is listed as an allergy  Ativan prn for anxiety/dyspnea crisis on BiPAP  Supplemental oxygen with SpO2 goal > 88%  Wean BiPAP as soon as possible  Smoking cessation education        Cardiac/Vascular  Hypertension  Listed on problem list, but no antihypertensives listed on home meds list.  SBP > 200 on presentation, down to 140s after 2 mg IV Ativan given.    Losartan 25mg qd, nifedipine 30mg qd  PRN labetalol IV for SBP > 170  Will need close PCP follow-up after DC    Other  History of benzodiazepine use  Anxiety    Prescribed Xanax 0.25 mg QHS PRN for anxiety since at least 2020.  Unable to access  at this time.  Given 1 mg Ativan IV x 2 in ED.    Xanax 1mg prn       Critical Care Daily Checklist:    A: Awake: RASS Goal/Actual Goal:    Actual:     B:  "Spontaneous Breathing Trial Performed?     C: SAT & SBT Coordinated?  N/a                      D: Delirium: CAM-ICU Overall CAM-ICU: Negative   E: Early Mobility Performed? No   F: Feeding Goal:    Status:     Current Diet Order   Procedures    Diet NPO      AS: Analgesia/Sedation Xanax prn, morphine prn   T: Thromboembolic Prophylaxis lvx   H: HOB > 300 Yes   U: Stress Ulcer Prophylaxis (if needed) Iv ppi   G: Glucose Control None required   B: Bowel Function     I: Indwelling Catheter (Lines & Akhtar) Necessity Piv x2   D: De-escalation of Antimicrobials/Pharmacotherapies levaquin    Plan for the day/ETD Wean bipap    Code Status:  Family/Goals of Care: Full Code  Ongoing discussion with pt and pt daughter about goals of care. Regarding Code status, pt Initially requesting Chest compressions but declining intubation, however later said "do whatever is right". Will continue discussions       Critical secondary to acute respiratory failure requiring continuous biPAP     Critical care was time spent personally by me on the following activities: development of treatment plan with patient or surrogate and bedside caregivers, discussions with consultants, evaluation of patient's response to treatment, examination of patient, ordering and performing treatments and interventions, ordering and review of laboratory studies, ordering and review of radiographic studies, pulse oximetry, re-evaluation of patient's condition. This critical care time did not overlap with that of any other provider or involve time for any procedures.     Nahid Conley MD  Critical Care Medicine  [facility] - Cardiac Medical ICU  "

## 2023-12-08 LAB
ALBUMIN SERPL BCP-MCNC: 3.6 G/DL (ref 3.5–5.2)
ALLENS TEST: ABNORMAL
ALP SERPL-CCNC: 94 U/L (ref 55–135)
ALT SERPL W/O P-5'-P-CCNC: 8 U/L (ref 10–44)
ANION GAP SERPL CALC-SCNC: 12 MMOL/L (ref 8–16)
AST SERPL-CCNC: 13 U/L (ref 10–40)
BASOPHILS # BLD AUTO: ABNORMAL K/UL (ref 0–0.2)
BASOPHILS NFR BLD: 0 % (ref 0–1.9)
BILIRUB SERPL-MCNC: 0.5 MG/DL (ref 0.1–1)
BUN SERPL-MCNC: 51 MG/DL (ref 8–23)
CALCIUM SERPL-MCNC: 9.3 MG/DL (ref 8.7–10.5)
CHLORIDE SERPL-SCNC: 102 MMOL/L (ref 95–110)
CO2 SERPL-SCNC: 25 MMOL/L (ref 23–29)
CREAT SERPL-MCNC: 1.4 MG/DL (ref 0.5–1.4)
DELSYS: ABNORMAL
DIFFERENTIAL METHOD: ABNORMAL
EOSINOPHIL # BLD AUTO: ABNORMAL K/UL (ref 0–0.5)
EOSINOPHIL NFR BLD: 0 % (ref 0–8)
EP: 8
ERYTHROCYTE [DISTWIDTH] IN BLOOD BY AUTOMATED COUNT: 14.8 % (ref 11.5–14.5)
EST. GFR  (NO RACE VARIABLE): 51.1 ML/MIN/1.73 M^2
FIO2: 30
GLUCOSE SERPL-MCNC: 133 MG/DL (ref 70–110)
HCO3 UR-SCNC: 30.6 MMOL/L (ref 24–28)
HCT VFR BLD AUTO: 43.4 % (ref 40–54)
HCT VFR BLD CALC: 47 %PCV (ref 36–54)
HGB BLD-MCNC: 14.1 G/DL (ref 14–18)
IMM GRANULOCYTES # BLD AUTO: ABNORMAL K/UL (ref 0–0.04)
IMM GRANULOCYTES NFR BLD AUTO: ABNORMAL % (ref 0–0.5)
IP: 18
LYMPHOCYTES # BLD AUTO: ABNORMAL K/UL (ref 1–4.8)
LYMPHOCYTES NFR BLD: 33 % (ref 18–48)
MAGNESIUM SERPL-MCNC: 2.4 MG/DL (ref 1.6–2.6)
MCH RBC QN AUTO: 30.3 PG (ref 27–31)
MCHC RBC AUTO-ENTMCNC: 32.5 G/DL (ref 32–36)
MCV RBC AUTO: 93 FL (ref 82–98)
MODE: ABNORMAL
MONOCYTES # BLD AUTO: ABNORMAL K/UL (ref 0.3–1)
MONOCYTES NFR BLD: 4 % (ref 4–15)
NEUTROPHILS NFR BLD: 63 % (ref 38–73)
NRBC BLD-RTO: 0 /100 WBC
PCO2 BLDA: 51.3 MMHG (ref 35–45)
PH SMN: 7.38 [PH] (ref 7.35–7.45)
PHOSPHATE SERPL-MCNC: 4.1 MG/DL (ref 2.7–4.5)
PLATELET # BLD AUTO: 207 K/UL (ref 150–450)
PLATELET BLD QL SMEAR: ABNORMAL
PMV BLD AUTO: 10.3 FL (ref 9.2–12.9)
PO2 BLDA: 52 MMHG (ref 40–60)
POC BE: 6 MMOL/L
POC IONIZED CALCIUM: 1.19 MMOL/L (ref 1.06–1.42)
POC SATURATED O2: 85 % (ref 95–100)
POC TCO2: 32 MMOL/L (ref 24–29)
POTASSIUM BLD-SCNC: 4.7 MMOL/L (ref 3.5–5.1)
POTASSIUM SERPL-SCNC: 4.4 MMOL/L (ref 3.5–5.1)
PROT SERPL-MCNC: 6.8 G/DL (ref 6–8.4)
RBC # BLD AUTO: 4.66 M/UL (ref 4.6–6.2)
SAMPLE: ABNORMAL
SITE: ABNORMAL
SMUDGE CELLS BLD QL SMEAR: PRESENT
SODIUM BLD-SCNC: 137 MMOL/L (ref 136–145)
SODIUM SERPL-SCNC: 139 MMOL/L (ref 136–145)
WBC # BLD AUTO: 20.98 K/UL (ref 3.9–12.7)

## 2023-12-08 PROCEDURE — 99291 PR CRITICAL CARE, E/M 30-74 MINUTES: ICD-10-PCS | Mod: GC,,, | Performed by: INTERNAL MEDICINE

## 2023-12-08 PROCEDURE — 27100171 HC OXYGEN HIGH FLOW UP TO 24 HOURS

## 2023-12-08 PROCEDURE — 63600175 PHARM REV CODE 636 W HCPCS

## 2023-12-08 PROCEDURE — 85007 BL SMEAR W/DIFF WBC COUNT: CPT | Performed by: INTERNAL MEDICINE

## 2023-12-08 PROCEDURE — 25000003 PHARM REV CODE 250

## 2023-12-08 PROCEDURE — 25000242 PHARM REV CODE 250 ALT 637 W/ HCPCS

## 2023-12-08 PROCEDURE — 82803 BLOOD GASES ANY COMBINATION: CPT

## 2023-12-08 PROCEDURE — 94640 AIRWAY INHALATION TREATMENT: CPT

## 2023-12-08 PROCEDURE — C9113 INJ PANTOPRAZOLE SODIUM, VIA: HCPCS

## 2023-12-08 PROCEDURE — 63600175 PHARM REV CODE 636 W HCPCS: Performed by: NURSE PRACTITIONER

## 2023-12-08 PROCEDURE — 99291 CRITICAL CARE FIRST HOUR: CPT | Mod: GC,,, | Performed by: INTERNAL MEDICINE

## 2023-12-08 PROCEDURE — 99900035 HC TECH TIME PER 15 MIN (STAT)

## 2023-12-08 PROCEDURE — 94761 N-INVAS EAR/PLS OXIMETRY MLT: CPT | Mod: XB

## 2023-12-08 PROCEDURE — 20000000 HC ICU ROOM

## 2023-12-08 PROCEDURE — 83735 ASSAY OF MAGNESIUM: CPT | Performed by: INTERNAL MEDICINE

## 2023-12-08 PROCEDURE — 25000003 PHARM REV CODE 250: Performed by: NURSE PRACTITIONER

## 2023-12-08 PROCEDURE — 94660 CPAP INITIATION&MGMT: CPT

## 2023-12-08 PROCEDURE — 84100 ASSAY OF PHOSPHORUS: CPT | Performed by: INTERNAL MEDICINE

## 2023-12-08 PROCEDURE — 85027 COMPLETE CBC AUTOMATED: CPT | Performed by: INTERNAL MEDICINE

## 2023-12-08 PROCEDURE — 25000242 PHARM REV CODE 250 ALT 637 W/ HCPCS: Performed by: INTERNAL MEDICINE

## 2023-12-08 PROCEDURE — 99292 PR CRITICAL CARE, ADDL 30 MIN: ICD-10-PCS | Mod: GC,,, | Performed by: INTERNAL MEDICINE

## 2023-12-08 PROCEDURE — 80053 COMPREHEN METABOLIC PANEL: CPT | Performed by: INTERNAL MEDICINE

## 2023-12-08 PROCEDURE — 99292 CRITICAL CARE ADDL 30 MIN: CPT | Mod: GC,,, | Performed by: INTERNAL MEDICINE

## 2023-12-08 RX ORDER — BENZONATATE 100 MG/1
100 CAPSULE ORAL 3 TIMES DAILY PRN
Status: DISCONTINUED | OUTPATIENT
Start: 2023-12-08 | End: 2023-12-20 | Stop reason: HOSPADM

## 2023-12-08 RX ORDER — FLUTICASONE FUROATE AND VILANTEROL 200; 25 UG/1; UG/1
1 POWDER RESPIRATORY (INHALATION) DAILY
Status: DISCONTINUED | OUTPATIENT
Start: 2023-12-08 | End: 2023-12-20 | Stop reason: HOSPADM

## 2023-12-08 RX ADMIN — LEVALBUTEROL 1.25 MG: 1.25 SOLUTION, CONCENTRATE RESPIRATORY (INHALATION) at 04:12

## 2023-12-08 RX ADMIN — ALPRAZOLAM 1 MG: 0.5 TABLET ORAL at 09:12

## 2023-12-08 RX ADMIN — METHYLPREDNISOLONE SODIUM SUCCINATE 40 MG: 40 INJECTION, POWDER, FOR SOLUTION INTRAMUSCULAR; INTRAVENOUS at 06:12

## 2023-12-08 RX ADMIN — LEVALBUTEROL 1.25 MG: 1.25 SOLUTION, CONCENTRATE RESPIRATORY (INHALATION) at 01:12

## 2023-12-08 RX ADMIN — LEVALBUTEROL 1.25 MG: 1.25 SOLUTION, CONCENTRATE RESPIRATORY (INHALATION) at 07:12

## 2023-12-08 RX ADMIN — PANTOPRAZOLE SODIUM 40 MG: 40 INJECTION, POWDER, FOR SOLUTION INTRAVENOUS at 09:12

## 2023-12-08 RX ADMIN — NIFEDIPINE 30 MG: 30 TABLET, FILM COATED, EXTENDED RELEASE ORAL at 09:12

## 2023-12-08 RX ADMIN — IPRATROPIUM BROMIDE 0.5 MG: 0.5 SOLUTION RESPIRATORY (INHALATION) at 07:12

## 2023-12-08 RX ADMIN — LEVALBUTEROL 1.25 MG: 1.25 SOLUTION, CONCENTRATE RESPIRATORY (INHALATION) at 12:12

## 2023-12-08 RX ADMIN — MORPHINE SULFATE 2 MG: 2 INJECTION, SOLUTION INTRAMUSCULAR; INTRAVENOUS at 08:12

## 2023-12-08 RX ADMIN — MORPHINE SULFATE 2 MG: 2 INJECTION, SOLUTION INTRAMUSCULAR; INTRAVENOUS at 02:12

## 2023-12-08 RX ADMIN — MUPIROCIN: 20 OINTMENT TOPICAL at 08:12

## 2023-12-08 RX ADMIN — IPRATROPIUM BROMIDE 0.5 MG: 0.5 SOLUTION RESPIRATORY (INHALATION) at 01:12

## 2023-12-08 RX ADMIN — METHYLPREDNISOLONE SODIUM SUCCINATE 40 MG: 40 INJECTION, POWDER, FOR SOLUTION INTRAMUSCULAR; INTRAVENOUS at 09:12

## 2023-12-08 RX ADMIN — FLUTICASONE FUROATE AND VILANTEROL TRIFENATATE 1 PUFF: 200; 25 POWDER RESPIRATORY (INHALATION) at 01:12

## 2023-12-08 RX ADMIN — LEVALBUTEROL 1.25 MG: 1.25 SOLUTION, CONCENTRATE RESPIRATORY (INHALATION) at 11:12

## 2023-12-08 RX ADMIN — MORPHINE SULFATE 1 MG: 2 INJECTION, SOLUTION INTRAMUSCULAR; INTRAVENOUS at 12:12

## 2023-12-08 RX ADMIN — LOSARTAN POTASSIUM 25 MG: 25 TABLET, FILM COATED ORAL at 09:12

## 2023-12-08 RX ADMIN — IPRATROPIUM BROMIDE 0.5 MG: 0.5 SOLUTION RESPIRATORY (INHALATION) at 04:12

## 2023-12-08 RX ADMIN — IPRATROPIUM BROMIDE 0.5 MG: 0.5 SOLUTION RESPIRATORY (INHALATION) at 11:12

## 2023-12-08 RX ADMIN — MORPHINE SULFATE 1 MG: 2 INJECTION, SOLUTION INTRAMUSCULAR; INTRAVENOUS at 06:12

## 2023-12-08 RX ADMIN — ENOXAPARIN SODIUM 40 MG: 40 INJECTION SUBCUTANEOUS at 06:12

## 2023-12-08 RX ADMIN — IPRATROPIUM BROMIDE 0.5 MG: 0.5 SOLUTION RESPIRATORY (INHALATION) at 12:12

## 2023-12-08 RX ADMIN — METHYLPREDNISOLONE SODIUM SUCCINATE 40 MG: 40 INJECTION, POWDER, FOR SOLUTION INTRAMUSCULAR; INTRAVENOUS at 02:12

## 2023-12-08 RX ADMIN — BENZONATATE 100 MG: 100 CAPSULE ORAL at 08:12

## 2023-12-08 RX ADMIN — MUPIROCIN: 20 OINTMENT TOPICAL at 09:12

## 2023-12-08 NOTE — SUBJECTIVE & OBJECTIVE
Interval History/Significant Events: afebrile, HDS, remained on bipap overnight    Will work to transition off bipap and to comfort flow vs bubble flow. Will obtain VBG early afternoon to guide decision making.     CXR slightly improved from previous, c/w emphysema, no obvious effusion or consolidation.    Will add breo to daily medications inpatient.     Cr increase 2/2 lasix yesterday. CTM    Review of Systems   Constitutional:  Positive for activity change. Negative for diaphoresis and fever.   HENT:  Positive for congestion.    Respiratory:  Positive for cough, shortness of breath and wheezing.    Cardiovascular:  Negative for chest pain and leg swelling.   Gastrointestinal:  Negative for abdominal pain, diarrhea, nausea and vomiting.   Genitourinary:  Negative for difficulty urinating.   Skin:  Negative for rash.   Neurological:  Negative for syncope.   Psychiatric/Behavioral:  Negative for agitation.      Objective:     Vital Signs (Most Recent):  Temp: 97 °F (36.1 °C) (12/08/23 0300)  Pulse: 89 (12/08/23 0730)  Resp: 18 (12/08/23 0730)  BP: (!) 159/72 (12/08/23 0915)  SpO2: (!) 94 % (12/08/23 0600) Vital Signs (24h Range):  Temp:  [97 °F (36.1 °C)-97.7 °F (36.5 °C)] 97 °F (36.1 °C)  Pulse:  [80-98] 89  Resp:  [15-42] 18  SpO2:  [93 %-97 %] 94 %  BP: (107-163)/(63-85) 159/72   Weight: 50.4 kg (111 lb 1.8 oz)  Body mass index is 20.32 kg/m².      Intake/Output Summary (Last 24 hours) at 12/8/2023 0923  Last data filed at 12/8/2023 0453  Gross per 24 hour   Intake 148.33 ml   Output 1085 ml   Net -936.67 ml          Physical Exam  Vitals and nursing note reviewed.   Constitutional:       General: He is not in acute distress.     Appearance: He is ill-appearing.      Comments: BiPAP in place   HENT:      Head: Atraumatic.      Mouth/Throat:      Mouth: Mucous membranes are dry.   Eyes:      Conjunctiva/sclera: Conjunctivae normal.   Cardiovascular:      Rate and Rhythm: Normal rate and regular rhythm.    Pulmonary:      Comments: Accessory muscle use. Diffuse end expiratory wheeze  Abdominal:      Palpations: Abdomen is soft.      Tenderness: There is no abdominal tenderness.   Musculoskeletal:         General: No swelling or tenderness. Normal range of motion.   Skin:     General: Skin is warm and dry.   Neurological:      General: No focal deficit present.      Mental Status: He is alert and oriented to person, place, and time.   Psychiatric:         Mood and Affect: Mood normal.         Behavior: Behavior normal.            Vents:  Oxygen Concentration (%): (S) 30 (12/08/23 0731)  Lines/Drains/Airways       Drain  Duration             Male External Urinary Catheter 12/05/23 2200 Small 2 days              Peripheral Intravenous Line  Duration                  Peripheral IV - Single Lumen 12/05/23 2030 20 G Posterior;Right Forearm 2 days         Peripheral IV - Single Lumen 12/07/23 0900 22 G;1 in Anterior;Left;Proximal Forearm 1 day                  Significant Labs:    CBC/Anemia Profile:  Recent Labs   Lab 12/07/23  0435 12/08/23  0543   WBC 15.75* 20.98*   HGB 13.3* 14.1   HCT 41.8 43.4    207   MCV 97 93   RDW 15.3* 14.8*        Chemistries:  Recent Labs   Lab 12/07/23  0435 12/08/23  0521    139   K 4.1 4.4   * 102   CO2 22* 25   BUN 23 51*   CREATININE 0.8 1.4   CALCIUM 7.4* 9.3   ALBUMIN 2.9* 3.6   PROT 5.4* 6.8   BILITOT 0.3 0.5   ALKPHOS 81 94   ALT 9* 8*   AST 13 13   MG 1.9 2.4   PHOS 3.6 4.1       All pertinent labs within the past 24 hours have been reviewed.    Significant Imaging:  I have reviewed all pertinent imaging results/findings within the past 24 hours.

## 2023-12-08 NOTE — PROGRESS NOTES
[facility]  Critical Care Medicine  Progress Note    Patient Name: Francois Otero Sr.  MRN: [id number]  Admission Date: 12/5/2023  Hospital Length of Stay: 3 days  Code Status: Full Code  Attending Provider: Rupinder Fraser MD  Primary Care Provider: Carley Kothari MD   Principal Problem: COPD exacerbation    Subjective:     HPI:  78M with PMHx of CAD, hyperlipidemia, hypertension and COPD on 5 L of O2 at home presents to the ED c/o worsening shortness of breath for the last several days worse with exertion. Patient reports chronic dyspnea mostly with exertion, uses oxygen consistently with exertion and at night but does not always use it at rest. Baseline SpO2 88-92%. Reports that for the last several days he has noticed decreased exercise tolerance and worsening shortness of breath. Has prednisone at home for emergencies and reports using prednisone 40mg daily for the last 3 days without improvement in his symptoms. Has had prior COPD exacerbations in the past, and this is similar. Denies having any recent sick contacts. Denies having any other symptoms, including worse cough or fevers. Reports consistent use of his inhalers, but more as rescue inhaler and not daily maintenance inhalers. Pt is prior smoker.     ED course: Afebrile, HDS, tachycardic, requiring BiPAP 10/5 30% and satting >90%. CBC significant for WBC 23k, CMP with mild acidosis, nl renal function, covid/flu/rsv negative, BNP/trop negative, VBG 7.43/37/49/25. CXR with hyperinflated lung, emphysematous changes, no consolidation. Admit to MICU for COPD exacerbation requiring BiPAP.    Hospital/ICU Course:  78M with PMHx of CAD, hyperlipidemia, hypertension and COPD on 5 L of O2 at home presents to the ED c/o worsening shortness of breath for the last several days worse with exertion.  Admitted to MICU for COPD exacerbation requiring BiPAP. Started on solumedrol, duonebs, and levaquin. Working to wean BiPAP    Interval  History/Significant Events: afebrile, HDS, remained on bipap overnight    Will work to transition off bipap and to comfort flow vs bubble flow. Will obtain VBG early afternoon to guide decision making.     CXR slightly improved from previous, c/w emphysema, no obvious effusion or consolidation.    Will add breo to daily medications inpatient.     Cr increase 2/2 lasix yesterday. CTM    Review of Systems   Constitutional:  Positive for activity change. Negative for diaphoresis and fever.   HENT:  Positive for congestion.    Respiratory:  Positive for cough, shortness of breath and wheezing.    Cardiovascular:  Negative for chest pain and leg swelling.   Gastrointestinal:  Negative for abdominal pain, diarrhea, nausea and vomiting.   Genitourinary:  Negative for difficulty urinating.   Skin:  Negative for rash.   Neurological:  Negative for syncope.   Psychiatric/Behavioral:  Negative for agitation.      Objective:     Vital Signs (Most Recent):  Temp: 97 °F (36.1 °C) (12/08/23 0300)  Pulse: 89 (12/08/23 0730)  Resp: 18 (12/08/23 0730)  BP: (!) 159/72 (12/08/23 0915)  SpO2: (!) 94 % (12/08/23 0600) Vital Signs (24h Range):  Temp:  [97 °F (36.1 °C)-97.7 °F (36.5 °C)] 97 °F (36.1 °C)  Pulse:  [80-98] 89  Resp:  [15-42] 18  SpO2:  [93 %-97 %] 94 %  BP: (107-163)/(63-85) 159/72   Weight: 50.4 kg (111 lb 1.8 oz)  Body mass index is 20.32 kg/m².      Intake/Output Summary (Last 24 hours) at 12/8/2023 0923  Last data filed at 12/8/2023 0453  Gross per 24 hour   Intake 148.33 ml   Output 1085 ml   Net -936.67 ml          Physical Exam  Vitals and nursing note reviewed.   Constitutional:       General: He is not in acute distress.     Appearance: He is ill-appearing.      Comments: BiPAP in place   HENT:      Head: Atraumatic.      Mouth/Throat:      Mouth: Mucous membranes are dry.   Eyes:      Conjunctiva/sclera: Conjunctivae normal.   Cardiovascular:      Rate and Rhythm: Normal rate and regular rhythm.   Pulmonary:       Comments: Accessory muscle use. Diffuse end expiratory wheeze  Abdominal:      Palpations: Abdomen is soft.      Tenderness: There is no abdominal tenderness.   Musculoskeletal:         General: No swelling or tenderness. Normal range of motion.   Skin:     General: Skin is warm and dry.   Neurological:      General: No focal deficit present.      Mental Status: He is alert and oriented to person, place, and time.   Psychiatric:         Mood and Affect: Mood normal.         Behavior: Behavior normal.            Vents:  Oxygen Concentration (%): (S) 30 (12/08/23 0731)  Lines/Drains/Airways       Drain  Duration             Male External Urinary Catheter 12/05/23 2200 Small 2 days              Peripheral Intravenous Line  Duration                  Peripheral IV - Single Lumen 12/05/23 2030 20 G Posterior;Right Forearm 2 days         Peripheral IV - Single Lumen 12/07/23 0900 22 G;1 in Anterior;Left;Proximal Forearm 1 day                  Significant Labs:    CBC/Anemia Profile:  Recent Labs   Lab 12/07/23  0435 12/08/23  0543   WBC 15.75* 20.98*   HGB 13.3* 14.1   HCT 41.8 43.4    207   MCV 97 93   RDW 15.3* 14.8*        Chemistries:  Recent Labs   Lab 12/07/23  0435 12/08/23  0521    139   K 4.1 4.4   * 102   CO2 22* 25   BUN 23 51*   CREATININE 0.8 1.4   CALCIUM 7.4* 9.3   ALBUMIN 2.9* 3.6   PROT 5.4* 6.8   BILITOT 0.3 0.5   ALKPHOS 81 94   ALT 9* 8*   AST 13 13   MG 1.9 2.4   PHOS 3.6 4.1       All pertinent labs within the past 24 hours have been reviewed.    Significant Imaging:  I have reviewed all pertinent imaging results/findings within the past 24 hours.    ABG  Recent Labs   Lab 12/07/23  1126   PH 7.292*   PO2 42   PCO2 57.7*   HCO3 27.9   BE 1     Assessment/Plan:     Pulmonary  * COPD exacerbation  Shortness of Breath    Because the patient is experiencing an acute worsening in baseline symptoms (such as cough, dyspnea, and/or sputum production) beyond normal daily variations to an  extent that requires a change in therapy, they are in an acute COPD exacerbation.  Patient is is on oxygen at 5 L/min per nasal cannula..  They are currently exhibiting the following signs of a severe exacerbation: accessory respiratory muscle use and paradoxical chest wall movement.  As such, their exacerbation is classified as severe.    Admit to MICU due to his need for continuous BiPAP  Requiring increasing support, now on 18/8. Desats quickly when bipap removed for PO meds this morning  Trend VBGs, most recent 7.28/66/29  Ensure pt is on COPD Pathway and follow Global Initiative for Chronic Obstructive Lung Disease (GOLD) guidelines  Bronchodilators:   Duonebs q4h   Breo qd  Steroids: solumedrol 40mg q8h  Antibiotics: Levaquin 750 mg daily x 5 days.  Would ideally give CAP coverage with Zithromax  + CTX but penicillin is listed as an allergy  Xanax prn, morphine prn for anxiety/dyspnea crisis on BiPAP  Supplemental oxygen with SpO2 goal > 88%  Wean BiPAP as soon as possible  Smoking cessation education        Cardiac/Vascular  Hypertension  Listed on problem list, but no antihypertensives listed on home meds list.  SBP > 200 on presentation, down to 140s after 2 mg IV Ativan given.    Losartan 25mg qd, nifedipine 30mg qd  PRN labetalol IV for SBP > 170  Will need close PCP follow-up after DC    Other  History of benzodiazepine use  Anxiety    Prescribed Xanax 0.25 mg QHS PRN for anxiety since at least 2020.  Unable to access  at this time.  Given 1 mg Ativan IV x 2 in ED.    Xanax 1mg prn       Critical Care Daily Checklist:    A: Awake: RASS Goal/Actual Goal: RASS Goal: 0-->alert and calm  Actual:     B: Spontaneous Breathing Trial Performed?     C: SAT & SBT Coordinated?  N/a                      D: Delirium: CAM-ICU Overall CAM-ICU: Negative   E: Early Mobility Performed? Yes   F: Feeding Goal:    Status:     Current Diet Order   Procedures    Diet full liquid Standard Tray     Order Specific Question:    Tray type:     Answer:   Standard Tray      AS: Analgesia/Sedation Morphine, xanax   T: Thromboembolic Prophylaxis lvx   H: HOB > 300 Yes   U: Stress Ulcer Prophylaxis (if needed) ppi   G: Glucose Control ssi   B: Bowel Function     I: Indwelling Catheter (Lines & Akhtar) Necessity Piv x2   D: De-escalation of Antimicrobials/Pharmacotherapies levaquin    Plan for the day/ETD Wean bipap    Code Status:  Family/Goals of Care: Full Code         Critical secondary to AHRF requiring bipap      Critical care was time spent personally by me on the following activities: development of treatment plan with patient or surrogate and bedside caregivers, discussions with consultants, evaluation of patient's response to treatment, examination of patient, ordering and performing treatments and interventions, ordering and review of laboratory studies, ordering and review of radiographic studies, pulse oximetry, re-evaluation of patient's condition. This critical care time did not overlap with that of any other provider or involve time for any procedures.     Nahid Conley MD  Critical Care Medicine  [facility] - Cardiac Medical ICU

## 2023-12-08 NOTE — ASSESSMENT & PLAN NOTE
Shortness of Breath    Because the patient is experiencing an acute worsening in baseline symptoms (such as cough, dyspnea, and/or sputum production) beyond normal daily variations to an extent that requires a change in therapy, they are in an acute COPD exacerbation.  Patient is is on oxygen at 5 L/min per nasal cannula..  They are currently exhibiting the following signs of a severe exacerbation: accessory respiratory muscle use and paradoxical chest wall movement.  As such, their exacerbation is classified as severe.    Admit to MICU due to his need for continuous BiPAP  Requiring increasing support, now on 18/8. Desats quickly when bipap removed for PO meds this morning  Trend VBGs, most recent 7.28/66/29  Ensure pt is on COPD Pathway and follow Global Initiative for Chronic Obstructive Lung Disease (GOLD) guidelines  Bronchodilators:   Duonebs q4h   Breo qd  Steroids: solumedrol 40mg q8h  Antibiotics: Levaquin 750 mg daily x 5 days.  Would ideally give CAP coverage with Zithromax  + CTX but penicillin is listed as an allergy  Xanax prn, morphine prn for anxiety/dyspnea crisis on BiPAP  Supplemental oxygen with SpO2 goal > 88%  Wean BiPAP as soon as possible  Smoking cessation education

## 2023-12-08 NOTE — EICU
Intervention Initiated From:  [facility] / JAYY Pablo intervened regarding:  Documentation    Nurse Notified:  No    Doctor Notified:  No    Comments: shashank day 1 & 2 sent to daughter Jennifer Otero

## 2023-12-08 NOTE — PLAN OF CARE
MICU DAILY GOALS     Family/Goals of care/Code Status   Code Status: Full Code    24H Vital Sign Range  Temp:  [96.7 °F (35.9 °C)-97.7 °F (36.5 °C)]   Pulse:  [74-98]   Resp:  [15-49]   BP: (119-200)/(62-92)   SpO2:  [89 %-98 %]      Shift Events (include procedures and significant events)   Attempted to swap patient from bipap to NC today. This morning patient became SOB and had increased WOB after about five minutes off of bipap. Reattempted this afternoon around 1530; patient was able to tolerate off of bipap for ~30 minutes before using abdominal muscles to breathe and endorsed needing the bipap mask.     AWAKE RASS: Goal -    Actual -      Restraint necessity: Not necessary   BREATHE SBT: Not intubated    Coordinate A & B, analgesics/sedatives Pain: managed   SAT: Not intubated   Delirium CAM-ICU: Overall CAM-ICU: Negative   Early(intubated/ Progressive (non-intubated) Mobility MOVE Screen (INTUBATED ONLY): Not intubated    Activity: Activity Management: Rolling - L1   Feeding/Nutrition Diet order: Diet/Nutrition Received: NPO,     Thrombus DVT prophylaxis: VTE Required Core Measure: Pharmacological prophylaxis initiated/maintained   HOB Elevation Head of Bed (HOB) Positioning: HOB at 30-45 degrees   Ulcer Prophylaxis GI: yes   Glucose control managed     Skin Skin assessed during: Daily Assessment    Sacrum intact/not altered? Yes  Heels intact/not altered? Yes  Surgical wound? No    [] No Altered Skin Integrity Present    []Prevention Measures Documented    [] Altered Skin Integrity Present or Discovered   [] LDA present in EPIC              [] LDA added in EPIC   [] Wound Image Taken (required on admit,                   transfer/discharge and every Tuesday)    Wound Care Consulted? No    Attending Nurse:     Second RN/Staff Member:    Bowel Function constipation    Indwelling Catheter Necessity            De-escalation Antibiotics No     Problem: Adult Inpatient Plan of Care  Goal: Plan of Care  Review  Outcome: Ongoing, Progressing  Goal: Patient-Specific Goal (Individualized)  Outcome: Ongoing, Progressing  Goal: Optimal Comfort and Wellbeing  Outcome: Ongoing, Progressing     Problem: Renal Function Impairment (Acute Kidney Injury/Impairment)  Goal: Effective Renal Function  Outcome: Ongoing, Progressing

## 2023-12-08 NOTE — PLAN OF CARE
MICU DAILY GOALS     Family/Goals of care/Code Status   Code Status: Full Code    24H Vital Sign Range  Temp:  [96.7 °F (35.9 °C)-97.7 °F (36.5 °C)]   Pulse:  [74-98]   Resp:  [15-49]   BP: (107-200)/(63-92)   SpO2:  [89 %-97 %]      Shift Events (include procedures and significant events)   No acute events throughout shift    AWAKE RASS: Goal - RASS Goal: 0-->alert and calm  Actual - RASS (Laguerre Agitation-Sedation Scale): alert and calm    Restraint necessity: Not necessary   BREATHE SBT: Not intubated    Coordinate A & B, analgesics/sedatives Pain: managed   SAT: Not intubated   Delirium CAM-ICU: Overall CAM-ICU: Negative   Early(intubated/ Progressive (non-intubated) Mobility MOVE Screen (INTUBATED ONLY): Not intubated    Activity: Activity Management: Rolling - L1   Feeding/Nutrition Diet order: Diet/Nutrition Received: full liquid,     Thrombus DVT prophylaxis: VTE Required Core Measure: Pharmacological prophylaxis initiated/maintained   HOB Elevation Head of Bed (HOB) Positioning: HOB at 30-45 degrees   Ulcer Prophylaxis GI: yes   Glucose control managed     Skin Skin assessed during: Q Shift Change    Sacrum intact/not altered? Yes  Heels intact/not altered? Yes  Surgical wound? No    [x] No Altered Skin Integrity Present    [x]Prevention Measures Documented    [] Altered Skin Integrity Present or Discovered   [] LDA present in EPIC              [] LDA added in EPIC   [] Wound Image Taken (required on admit,                   transfer/discharge and every Tuesday)    Wound Care Consulted? No    Attending Nurse:     Second RN/Staff Member:    Bowel Function constipation    Indwelling Catheter Necessity            De-escalation Antibiotics Yes       VS and assessment per flow sheet, patient progressing towards goals as tolerated, plan of care reviewed with  patient , all concerns addressed, will continue to monitor.

## 2023-12-09 LAB
ALBUMIN SERPL BCP-MCNC: 3.7 G/DL (ref 3.5–5.2)
ALLENS TEST: ABNORMAL
ALP SERPL-CCNC: 90 U/L (ref 55–135)
ALT SERPL W/O P-5'-P-CCNC: 11 U/L (ref 10–44)
ANION GAP SERPL CALC-SCNC: 15 MMOL/L (ref 8–16)
AST SERPL-CCNC: 19 U/L (ref 10–40)
BASOPHILS # BLD AUTO: 0.08 K/UL (ref 0–0.2)
BASOPHILS NFR BLD: 0.4 % (ref 0–1.9)
BILIRUB SERPL-MCNC: 0.6 MG/DL (ref 0.1–1)
BUN SERPL-MCNC: 67 MG/DL (ref 8–23)
CALCIUM SERPL-MCNC: 9.3 MG/DL (ref 8.7–10.5)
CHLORIDE SERPL-SCNC: 105 MMOL/L (ref 95–110)
CO2 SERPL-SCNC: 20 MMOL/L (ref 23–29)
CREAT SERPL-MCNC: 1.4 MG/DL (ref 0.5–1.4)
DELSYS: ABNORMAL
DIFFERENTIAL METHOD: ABNORMAL
EOSINOPHIL # BLD AUTO: 0 K/UL (ref 0–0.5)
EOSINOPHIL NFR BLD: 0 % (ref 0–8)
ERYTHROCYTE [DISTWIDTH] IN BLOOD BY AUTOMATED COUNT: 14.8 % (ref 11.5–14.5)
EST. GFR  (NO RACE VARIABLE): 51.1 ML/MIN/1.73 M^2
GLUCOSE SERPL-MCNC: 146 MG/DL (ref 70–110)
HCO3 UR-SCNC: 30 MMOL/L (ref 24–28)
HCT VFR BLD AUTO: 47.9 % (ref 40–54)
HGB BLD-MCNC: 15.4 G/DL (ref 14–18)
IMM GRANULOCYTES # BLD AUTO: 0.21 K/UL (ref 0–0.04)
IMM GRANULOCYTES NFR BLD AUTO: 0.9 % (ref 0–0.5)
LYMPHOCYTES # BLD AUTO: 8.5 K/UL (ref 1–4.8)
LYMPHOCYTES NFR BLD: 37.2 % (ref 18–48)
MAGNESIUM SERPL-MCNC: 2.8 MG/DL (ref 1.6–2.6)
MCH RBC QN AUTO: 29.8 PG (ref 27–31)
MCHC RBC AUTO-ENTMCNC: 32.2 G/DL (ref 32–36)
MCV RBC AUTO: 93 FL (ref 82–98)
MODE: ABNORMAL
MONOCYTES # BLD AUTO: 0.7 K/UL (ref 0.3–1)
MONOCYTES NFR BLD: 3 % (ref 4–15)
NEUTROPHILS # BLD AUTO: 13.4 K/UL (ref 1.8–7.7)
NEUTROPHILS NFR BLD: 58.5 % (ref 38–73)
NRBC BLD-RTO: 0 /100 WBC
PCO2 BLDA: 54 MMHG (ref 35–45)
PH SMN: 7.35 [PH] (ref 7.35–7.45)
PHOSPHATE SERPL-MCNC: 3.9 MG/DL (ref 2.7–4.5)
PLATELET # BLD AUTO: 221 K/UL (ref 150–450)
PMV BLD AUTO: 10.5 FL (ref 9.2–12.9)
PO2 BLDA: 51 MMHG (ref 40–60)
POC BE: 4 MMOL/L
POC SATURATED O2: 83 % (ref 95–100)
POC TCO2: 32 MMOL/L (ref 24–29)
POTASSIUM SERPL-SCNC: 4.7 MMOL/L (ref 3.5–5.1)
PROT SERPL-MCNC: 7.1 G/DL (ref 6–8.4)
RBC # BLD AUTO: 5.16 M/UL (ref 4.6–6.2)
SAMPLE: ABNORMAL
SITE: ABNORMAL
SODIUM SERPL-SCNC: 140 MMOL/L (ref 136–145)
WBC # BLD AUTO: 22.84 K/UL (ref 3.9–12.7)

## 2023-12-09 PROCEDURE — 94660 CPAP INITIATION&MGMT: CPT

## 2023-12-09 PROCEDURE — 63600175 PHARM REV CODE 636 W HCPCS

## 2023-12-09 PROCEDURE — 63600175 PHARM REV CODE 636 W HCPCS: Performed by: NURSE PRACTITIONER

## 2023-12-09 PROCEDURE — 27100171 HC OXYGEN HIGH FLOW UP TO 24 HOURS

## 2023-12-09 PROCEDURE — 82800 BLOOD PH: CPT

## 2023-12-09 PROCEDURE — 83735 ASSAY OF MAGNESIUM: CPT

## 2023-12-09 PROCEDURE — C9113 INJ PANTOPRAZOLE SODIUM, VIA: HCPCS

## 2023-12-09 PROCEDURE — 99292 PR CRITICAL CARE, ADDL 30 MIN: ICD-10-PCS | Mod: GC,,, | Performed by: INTERNAL MEDICINE

## 2023-12-09 PROCEDURE — 99291 PR CRITICAL CARE, E/M 30-74 MINUTES: ICD-10-PCS | Mod: GC,,, | Performed by: INTERNAL MEDICINE

## 2023-12-09 PROCEDURE — 94761 N-INVAS EAR/PLS OXIMETRY MLT: CPT

## 2023-12-09 PROCEDURE — 82803 BLOOD GASES ANY COMBINATION: CPT

## 2023-12-09 PROCEDURE — 25000242 PHARM REV CODE 250 ALT 637 W/ HCPCS: Performed by: INTERNAL MEDICINE

## 2023-12-09 PROCEDURE — 25000003 PHARM REV CODE 250

## 2023-12-09 PROCEDURE — 99291 CRITICAL CARE FIRST HOUR: CPT | Mod: GC,,, | Performed by: INTERNAL MEDICINE

## 2023-12-09 PROCEDURE — 80053 COMPREHEN METABOLIC PANEL: CPT

## 2023-12-09 PROCEDURE — 20000000 HC ICU ROOM

## 2023-12-09 PROCEDURE — 94640 AIRWAY INHALATION TREATMENT: CPT

## 2023-12-09 PROCEDURE — 99900035 HC TECH TIME PER 15 MIN (STAT)

## 2023-12-09 PROCEDURE — 99292 CRITICAL CARE ADDL 30 MIN: CPT | Mod: GC,,, | Performed by: INTERNAL MEDICINE

## 2023-12-09 PROCEDURE — 85025 COMPLETE CBC W/AUTO DIFF WBC: CPT

## 2023-12-09 PROCEDURE — 25000242 PHARM REV CODE 250 ALT 637 W/ HCPCS

## 2023-12-09 PROCEDURE — 84100 ASSAY OF PHOSPHORUS: CPT

## 2023-12-09 RX ORDER — POLYETHYLENE GLYCOL 3350 17 G/17G
17 POWDER, FOR SOLUTION ORAL DAILY
Status: DISCONTINUED | OUTPATIENT
Start: 2023-12-09 | End: 2023-12-20 | Stop reason: HOSPADM

## 2023-12-09 RX ORDER — AMOXICILLIN 250 MG
1 CAPSULE ORAL DAILY PRN
Status: DISCONTINUED | OUTPATIENT
Start: 2023-12-09 | End: 2023-12-10

## 2023-12-09 RX ORDER — NIFEDIPINE 30 MG/1
60 TABLET, EXTENDED RELEASE ORAL DAILY
Status: DISCONTINUED | OUTPATIENT
Start: 2023-12-10 | End: 2023-12-20 | Stop reason: HOSPADM

## 2023-12-09 RX ORDER — HEPARIN SODIUM 5000 [USP'U]/ML
5000 INJECTION, SOLUTION INTRAVENOUS; SUBCUTANEOUS EVERY 8 HOURS
Status: DISCONTINUED | OUTPATIENT
Start: 2023-12-09 | End: 2023-12-20 | Stop reason: HOSPADM

## 2023-12-09 RX ORDER — PANTOPRAZOLE SODIUM 40 MG/1
40 TABLET, DELAYED RELEASE ORAL DAILY
Status: DISCONTINUED | OUTPATIENT
Start: 2023-12-10 | End: 2023-12-20 | Stop reason: HOSPADM

## 2023-12-09 RX ADMIN — PANTOPRAZOLE SODIUM 40 MG: 40 INJECTION, POWDER, FOR SOLUTION INTRAVENOUS at 08:12

## 2023-12-09 RX ADMIN — SODIUM CHLORIDE, POTASSIUM CHLORIDE, SODIUM LACTATE AND CALCIUM CHLORIDE 500 ML: 600; 310; 30; 20 INJECTION, SOLUTION INTRAVENOUS at 08:12

## 2023-12-09 RX ADMIN — IPRATROPIUM BROMIDE 0.5 MG: 0.5 SOLUTION RESPIRATORY (INHALATION) at 11:12

## 2023-12-09 RX ADMIN — MORPHINE SULFATE 2 MG: 2 INJECTION, SOLUTION INTRAMUSCULAR; INTRAVENOUS at 08:12

## 2023-12-09 RX ADMIN — LOSARTAN POTASSIUM 25 MG: 25 TABLET, FILM COATED ORAL at 08:12

## 2023-12-09 RX ADMIN — LEVALBUTEROL 1.25 MG: 1.25 SOLUTION, CONCENTRATE RESPIRATORY (INHALATION) at 11:12

## 2023-12-09 RX ADMIN — HEPARIN SODIUM 5000 UNITS: 5000 INJECTION INTRAVENOUS; SUBCUTANEOUS at 09:12

## 2023-12-09 RX ADMIN — LEVALBUTEROL 1.25 MG: 1.25 SOLUTION, CONCENTRATE RESPIRATORY (INHALATION) at 08:12

## 2023-12-09 RX ADMIN — IPRATROPIUM BROMIDE 0.5 MG: 0.5 SOLUTION RESPIRATORY (INHALATION) at 08:12

## 2023-12-09 RX ADMIN — LEVALBUTEROL 1.25 MG: 1.25 SOLUTION, CONCENTRATE RESPIRATORY (INHALATION) at 07:12

## 2023-12-09 RX ADMIN — ALPRAZOLAM 1 MG: 0.5 TABLET ORAL at 05:12

## 2023-12-09 RX ADMIN — LEVOFLOXACIN 750 MG: 750 INJECTION, SOLUTION INTRAVENOUS at 04:12

## 2023-12-09 RX ADMIN — NIFEDIPINE 30 MG: 30 TABLET, FILM COATED, EXTENDED RELEASE ORAL at 08:12

## 2023-12-09 RX ADMIN — ALPRAZOLAM 1 MG: 0.5 TABLET ORAL at 11:12

## 2023-12-09 RX ADMIN — IPRATROPIUM BROMIDE 0.5 MG: 0.5 SOLUTION RESPIRATORY (INHALATION) at 04:12

## 2023-12-09 RX ADMIN — IPRATROPIUM BROMIDE 0.5 MG: 0.5 SOLUTION RESPIRATORY (INHALATION) at 07:12

## 2023-12-09 RX ADMIN — MUPIROCIN: 20 OINTMENT TOPICAL at 08:12

## 2023-12-09 RX ADMIN — POLYETHYLENE GLYCOL 3350 17 G: 17 POWDER, FOR SOLUTION ORAL at 02:12

## 2023-12-09 RX ADMIN — METHYLPREDNISOLONE SODIUM SUCCINATE 40 MG: 40 INJECTION, POWDER, FOR SOLUTION INTRAMUSCULAR; INTRAVENOUS at 06:12

## 2023-12-09 RX ADMIN — FLUTICASONE FUROATE AND VILANTEROL TRIFENATATE 1 PUFF: 200; 25 POWDER RESPIRATORY (INHALATION) at 08:12

## 2023-12-09 RX ADMIN — METHYLPREDNISOLONE SODIUM SUCCINATE 40 MG: 40 INJECTION, POWDER, FOR SOLUTION INTRAMUSCULAR; INTRAVENOUS at 09:12

## 2023-12-09 RX ADMIN — METHYLPREDNISOLONE SODIUM SUCCINATE 40 MG: 40 INJECTION, POWDER, FOR SOLUTION INTRAMUSCULAR; INTRAVENOUS at 02:12

## 2023-12-09 RX ADMIN — LEVALBUTEROL 1.25 MG: 1.25 SOLUTION, CONCENTRATE RESPIRATORY (INHALATION) at 04:12

## 2023-12-09 RX ADMIN — HEPARIN SODIUM 5000 UNITS: 5000 INJECTION INTRAVENOUS; SUBCUTANEOUS at 02:12

## 2023-12-09 NOTE — PROGRESS NOTES
[facility] - Cardiac Medical [facility]  Critical Care Medicine  Progress Note    Patient Name: Francois Otero Sr.  MRN: [id number]  Admission Date: 12/5/2023  Hospital Length of Stay: 4 days  Code Status: Full Code  Attending Provider: Rupinder Fraser MD  Primary Care Provider: Carley Kothari MD   Principal Problem: COPD exacerbation    Subjective:     HPI:  78M with PMHx of CAD, hyperlipidemia, hypertension and COPD on 5 L of O2 at home presents to the ED c/o worsening shortness of breath for the last several days worse with exertion. Patient reports chronic dyspnea mostly with exertion, uses oxygen consistently with exertion and at night but does not always use it at rest. Baseline SpO2 88-92%. Reports that for the last several days he has noticed decreased exercise tolerance and worsening shortness of breath. Has prednisone at home for emergencies and reports using prednisone 40mg daily for the last 3 days without improvement in his symptoms. Has had prior COPD exacerbations in the past, and this is similar. Denies having any recent sick contacts. Denies having any other symptoms, including worse cough or fevers. Reports consistent use of his inhalers, but more as rescue inhaler and not daily maintenance inhalers. Pt is prior smoker.     ED course: Afebrile, HDS, tachycardic, requiring BiPAP 10/5 30% and satting >90%. CBC significant for WBC 23k, CMP with mild acidosis, nl renal function, covid/flu/rsv negative, BNP/trop negative, VBG 7.43/37/49/25. CXR with hyperinflated lung, emphysematous changes, no consolidation. Admit to MICU for COPD exacerbation requiring BiPAP.    Hospital/ICU Course:  78M with PMHx of CAD, hyperlipidemia, hypertension and COPD on 5 L of O2 at home presents to the ED c/o worsening shortness of breath for the last several days worse with exertion.  Admitted to MICU for COPD exacerbation requiring BiPAP. Started on solumedrol, duonebs, and levaquin. Working to wean BiPAP    Interval  History/Significant Events: NAEON. Afebrile, HDS. Wore bipap overnight    Working to spend majority of day on comfort flow. Having trouble making sustained forward progress 2/2 increased WOB/anxiety surrounding SOB when off bipap.     VBG this morning slightly improved, trending in the right direction, 7.35/54/51/30     Review of Systems   Constitutional:  Positive for activity change. Negative for diaphoresis and fever.   HENT:  Positive for congestion.    Respiratory:  Positive for cough, shortness of breath and wheezing.    Cardiovascular:  Negative for chest pain and leg swelling.   Gastrointestinal:  Negative for abdominal pain, diarrhea, nausea and vomiting.   Genitourinary:  Negative for difficulty urinating.   Skin:  Negative for rash.   Neurological:  Negative for syncope.   Psychiatric/Behavioral:  Negative for agitation.      Objective:     Vital Signs (Most Recent):  Temp: 96.8 °F (36 °C) (12/09/23 0700)  Pulse: 89 (12/09/23 1000)  Resp: 16 (12/09/23 1000)  BP: (!) 166/78 (12/09/23 1000)  SpO2: 96 % (12/09/23 1000) Vital Signs (24h Range):  Temp:  [96.8 °F (36 °C)-97.7 °F (36.5 °C)] 96.8 °F (36 °C)  Pulse:  [] 89  Resp:  [12-51] 16  SpO2:  [89 %-98 %] 96 %  BP: (134-166)/(62-83) 166/78   Weight: 50.4 kg (111 lb 1.8 oz)  Body mass index is 20.32 kg/m².      Intake/Output Summary (Last 24 hours) at 12/9/2023 1110  Last data filed at 12/9/2023 1000  Gross per 24 hour   Intake 433.07 ml   Output 495 ml   Net -61.93 ml          Physical Exam  Vitals and nursing note reviewed.   Constitutional:       General: He is not in acute distress.     Appearance: He is ill-appearing.      Comments: BiPAP in place   HENT:      Head: Atraumatic.      Mouth/Throat:      Mouth: Mucous membranes are dry.   Eyes:      Conjunctiva/sclera: Conjunctivae normal.   Cardiovascular:      Rate and Rhythm: Normal rate and regular rhythm.   Pulmonary:      Comments: Accessory muscle use. Diffuse end expiratory wheeze  Abdominal:       Palpations: Abdomen is soft.      Tenderness: There is no abdominal tenderness.   Musculoskeletal:         General: No swelling or tenderness. Normal range of motion.   Skin:     General: Skin is warm and dry.   Neurological:      General: No focal deficit present.      Mental Status: He is alert and oriented to person, place, and time.   Psychiatric:         Mood and Affect: Mood normal.         Behavior: Behavior normal.            Vents:  Oxygen Concentration (%): 30 (12/09/23 1000)  Lines/Drains/Airways       Drain  Duration             Male External Urinary Catheter 12/05/23 2200 Small 3 days              Peripheral Intravenous Line  Duration                  Peripheral IV - Single Lumen 12/05/23 2030 20 G Posterior;Right Forearm 3 days         Peripheral IV - Single Lumen 12/07/23 0900 22 G;1 in Anterior;Left;Proximal Forearm 2 days                  Significant Labs:    CBC/Anemia Profile:  Recent Labs   Lab 12/08/23  0543 12/08/23  1125 12/09/23  0252   WBC 20.98*  --  22.84*   HGB 14.1  --  15.4   HCT 43.4 47 47.9     --  221   MCV 93  --  93   RDW 14.8*  --  14.8*        Chemistries:  Recent Labs   Lab 12/08/23  0521 12/09/23  0252    140   K 4.4 4.7    105   CO2 25 20*   BUN 51* 67*   CREATININE 1.4 1.4   CALCIUM 9.3 9.3   ALBUMIN 3.6 3.7   PROT 6.8 7.1   BILITOT 0.5 0.6   ALKPHOS 94 90   ALT 8* 11   AST 13 19   MG 2.4 2.8*   PHOS 4.1 3.9       All pertinent labs within the past 24 hours have been reviewed.    Significant Imaging:  I have reviewed all pertinent imaging results/findings within the past 24 hours.    ABG  Recent Labs   Lab 12/09/23  0823   PH 7.352   PO2 51   PCO2 54.0*   HCO3 30.0*   BE 4*     Assessment/Plan:     Pulmonary  * COPD exacerbation  Shortness of Breath    Because the patient is experiencing an acute worsening in baseline symptoms (such as cough, dyspnea, and/or sputum production) beyond normal daily variations to an extent that requires a change in  therapy, they are in an acute COPD exacerbation.  Patient is is on oxygen at 5 L/min per nasal cannula..  They are currently exhibiting the following signs of a severe exacerbation: accessory respiratory muscle use and paradoxical chest wall movement.  As such, their exacerbation is classified as severe.    Admit to MICU due to his need for continuous BiPAP  Requiring increasing support, now on 18/8. Desats quickly when bipap removed for PO meds this morning  Trend VBGs, most recent 7.28/66/29  Ensure pt is on COPD Pathway and follow Global Initiative for Chronic Obstructive Lung Disease (GOLD) guidelines  Bronchodilators:   Duonebs q4h   Breo qd  Steroids: solumedrol 40mg q8h  Antibiotics: Levaquin 750 mg daily x 5 days.  Would ideally give CAP coverage with Zithromax  + CTX but penicillin is listed as an allergy  Xanax prn, morphine prn for anxiety/dyspnea crisis on BiPAP  Supplemental oxygen with SpO2 goal > 88%  Wean BiPAP as soon as possible  Smoking cessation education        Cardiac/Vascular  Hypertension  Listed on problem list, but no antihypertensives listed on home meds list.  SBP > 200 on presentation, down to 140s after 2 mg IV Ativan given.    Losartan 25mg qd, nifedipine 30mg qd  PRN labetalol IV for SBP > 170  Will need close PCP follow-up after DC    Other  History of benzodiazepine use  Anxiety    Prescribed Xanax 0.25 mg QHS PRN for anxiety since at least 2020.  Unable to access  at this time.  Given 1 mg Ativan IV x 2 in ED.    Xanax 1mg prn       Critical Care Daily Checklist:    A: Awake: RASS Goal/Actual Goal: RASS Goal: 0-->alert and calm  Actual:     B: Spontaneous Breathing Trial Performed?     C: SAT & SBT Coordinated?  N/a                      D: Delirium: CAM-ICU Overall CAM-ICU: Negative   E: Early Mobility Performed? Yes   F: Feeding Goal:    Status:     Current Diet Order   Procedures    Diet full liquid Standard Tray     Order Specific Question:   Tray type:     Answer:   Standard  Tray      AS: Analgesia/Sedation Morphine prn, xanax prn   T: Thromboembolic Prophylaxis lvx   H: HOB > 300 Yes   U: Stress Ulcer Prophylaxis (if needed) None required   G: Glucose Control None require   B: Bowel Function     I: Indwelling Catheter (Lines & Akhtar) Necessity PIV x2   D: De-escalation of Antimicrobials/Pharmacotherapies levaquin    Plan for the day/ETD Wean bipap    Code Status:  Family/Goals of Care: Full Code         Critical secondary to AHRF requiring bipap      Critical care was time spent personally by me on the following activities: development of treatment plan with patient or surrogate and bedside caregivers, discussions with consultants, evaluation of patient's response to treatment, examination of patient, ordering and performing treatments and interventions, ordering and review of laboratory studies, ordering and review of radiographic studies, pulse oximetry, re-evaluation of patient's condition. This critical care time did not overlap with that of any other provider or involve time for any procedures.     Nahid Conley MD  Critical Care Medicine  [facility] - Cardiac Medical ICU

## 2023-12-09 NOTE — SUBJECTIVE & OBJECTIVE
Interval History/Significant Events: NAEON. Afebrile, HDS. Wore bipap overnight    Working to spend majority of day on comfort flow. Having trouble making sustained forward progress 2/2 increased WOB/anxiety surrounding SOB when off bipap.     VBG this morning slightly improved, trending in the right direction, 7.35/54/51/30     Review of Systems   Constitutional:  Positive for activity change. Negative for diaphoresis and fever.   HENT:  Positive for congestion.    Respiratory:  Positive for cough, shortness of breath and wheezing.    Cardiovascular:  Negative for chest pain and leg swelling.   Gastrointestinal:  Negative for abdominal pain, diarrhea, nausea and vomiting.   Genitourinary:  Negative for difficulty urinating.   Skin:  Negative for rash.   Neurological:  Negative for syncope.   Psychiatric/Behavioral:  Negative for agitation.      Objective:     Vital Signs (Most Recent):  Temp: 96.8 °F (36 °C) (12/09/23 0700)  Pulse: 89 (12/09/23 1000)  Resp: 16 (12/09/23 1000)  BP: (!) 166/78 (12/09/23 1000)  SpO2: 96 % (12/09/23 1000) Vital Signs (24h Range):  Temp:  [96.8 °F (36 °C)-97.7 °F (36.5 °C)] 96.8 °F (36 °C)  Pulse:  [] 89  Resp:  [12-51] 16  SpO2:  [89 %-98 %] 96 %  BP: (134-166)/(62-83) 166/78   Weight: 50.4 kg (111 lb 1.8 oz)  Body mass index is 20.32 kg/m².      Intake/Output Summary (Last 24 hours) at 12/9/2023 1110  Last data filed at 12/9/2023 1000  Gross per 24 hour   Intake 433.07 ml   Output 495 ml   Net -61.93 ml          Physical Exam  Vitals and nursing note reviewed.   Constitutional:       General: He is not in acute distress.     Appearance: He is ill-appearing.      Comments: BiPAP in place   HENT:      Head: Atraumatic.      Mouth/Throat:      Mouth: Mucous membranes are dry.   Eyes:      Conjunctiva/sclera: Conjunctivae normal.   Cardiovascular:      Rate and Rhythm: Normal rate and regular rhythm.   Pulmonary:      Comments: Accessory muscle use. Diffuse end expiratory  wheeze  Abdominal:      Palpations: Abdomen is soft.      Tenderness: There is no abdominal tenderness.   Musculoskeletal:         General: No swelling or tenderness. Normal range of motion.   Skin:     General: Skin is warm and dry.   Neurological:      General: No focal deficit present.      Mental Status: He is alert and oriented to person, place, and time.   Psychiatric:         Mood and Affect: Mood normal.         Behavior: Behavior normal.            Vents:  Oxygen Concentration (%): 30 (12/09/23 1000)  Lines/Drains/Airways       Drain  Duration             Male External Urinary Catheter 12/05/23 2200 Small 3 days              Peripheral Intravenous Line  Duration                  Peripheral IV - Single Lumen 12/05/23 2030 20 G Posterior;Right Forearm 3 days         Peripheral IV - Single Lumen 12/07/23 0900 22 G;1 in Anterior;Left;Proximal Forearm 2 days                  Significant Labs:    CBC/Anemia Profile:  Recent Labs   Lab 12/08/23  0543 12/08/23  1125 12/09/23  0252   WBC 20.98*  --  22.84*   HGB 14.1  --  15.4   HCT 43.4 47 47.9     --  221   MCV 93  --  93   RDW 14.8*  --  14.8*        Chemistries:  Recent Labs   Lab 12/08/23  0521 12/09/23  0252    140   K 4.4 4.7    105   CO2 25 20*   BUN 51* 67*   CREATININE 1.4 1.4   CALCIUM 9.3 9.3   ALBUMIN 3.6 3.7   PROT 6.8 7.1   BILITOT 0.5 0.6   ALKPHOS 94 90   ALT 8* 11   AST 13 19   MG 2.4 2.8*   PHOS 4.1 3.9       All pertinent labs within the past 24 hours have been reviewed.    Significant Imaging:  I have reviewed all pertinent imaging results/findings within the past 24 hours.

## 2023-12-10 PROBLEM — Z51.5 PALLIATIVE CARE ENCOUNTER: Status: ACTIVE | Noted: 2020-12-03

## 2023-12-10 LAB
ALBUMIN SERPL BCP-MCNC: 3.5 G/DL (ref 3.5–5.2)
ALP SERPL-CCNC: 76 U/L (ref 55–135)
ALT SERPL W/O P-5'-P-CCNC: 13 U/L (ref 10–44)
ANION GAP SERPL CALC-SCNC: 10 MMOL/L (ref 8–16)
AST SERPL-CCNC: 18 U/L (ref 10–40)
BACTERIA BLD CULT: NORMAL
BACTERIA BLD CULT: NORMAL
BASOPHILS # BLD AUTO: 0.05 K/UL (ref 0–0.2)
BASOPHILS NFR BLD: 0.3 % (ref 0–1.9)
BILIRUB SERPL-MCNC: 0.6 MG/DL (ref 0.1–1)
BUN SERPL-MCNC: 55 MG/DL (ref 8–23)
CALCIUM SERPL-MCNC: 9 MG/DL (ref 8.7–10.5)
CHLORIDE SERPL-SCNC: 107 MMOL/L (ref 95–110)
CO2 SERPL-SCNC: 24 MMOL/L (ref 23–29)
CREAT SERPL-MCNC: 1.2 MG/DL (ref 0.5–1.4)
DIFFERENTIAL METHOD: ABNORMAL
EOSINOPHIL # BLD AUTO: 0 K/UL (ref 0–0.5)
EOSINOPHIL NFR BLD: 0 % (ref 0–8)
ERYTHROCYTE [DISTWIDTH] IN BLOOD BY AUTOMATED COUNT: 14.9 % (ref 11.5–14.5)
EST. GFR  (NO RACE VARIABLE): >60 ML/MIN/1.73 M^2
GLUCOSE SERPL-MCNC: 139 MG/DL (ref 70–110)
HCT VFR BLD AUTO: 43.6 % (ref 40–54)
HGB BLD-MCNC: 14.7 G/DL (ref 14–18)
IMM GRANULOCYTES # BLD AUTO: 0.17 K/UL (ref 0–0.04)
IMM GRANULOCYTES NFR BLD AUTO: 0.9 % (ref 0–0.5)
LYMPHOCYTES # BLD AUTO: 7.4 K/UL (ref 1–4.8)
LYMPHOCYTES NFR BLD: 38.4 % (ref 18–48)
MAGNESIUM SERPL-MCNC: 2.6 MG/DL (ref 1.6–2.6)
MCH RBC QN AUTO: 30.1 PG (ref 27–31)
MCHC RBC AUTO-ENTMCNC: 33.7 G/DL (ref 32–36)
MCV RBC AUTO: 89 FL (ref 82–98)
MONOCYTES # BLD AUTO: 0.6 K/UL (ref 0.3–1)
MONOCYTES NFR BLD: 3 % (ref 4–15)
NEUTROPHILS # BLD AUTO: 11 K/UL (ref 1.8–7.7)
NEUTROPHILS NFR BLD: 57.4 % (ref 38–73)
NRBC BLD-RTO: 0 /100 WBC
PHOSPHATE SERPL-MCNC: 3 MG/DL (ref 2.7–4.5)
PLATELET # BLD AUTO: 178 K/UL (ref 150–450)
PLATELET BLD QL SMEAR: ABNORMAL
PMV BLD AUTO: 10 FL (ref 9.2–12.9)
POTASSIUM SERPL-SCNC: 4.9 MMOL/L (ref 3.5–5.1)
PROT SERPL-MCNC: 6.7 G/DL (ref 6–8.4)
RBC # BLD AUTO: 4.89 M/UL (ref 4.6–6.2)
SODIUM SERPL-SCNC: 141 MMOL/L (ref 136–145)
WBC # BLD AUTO: 19.15 K/UL (ref 3.9–12.7)

## 2023-12-10 PROCEDURE — 85025 COMPLETE CBC W/AUTO DIFF WBC: CPT

## 2023-12-10 PROCEDURE — 25000003 PHARM REV CODE 250

## 2023-12-10 PROCEDURE — 97165 OT EVAL LOW COMPLEX 30 MIN: CPT

## 2023-12-10 PROCEDURE — 99900035 HC TECH TIME PER 15 MIN (STAT)

## 2023-12-10 PROCEDURE — 99233 PR SUBSEQUENT HOSPITAL CARE,LEVL III: ICD-10-PCS | Mod: GC,,, | Performed by: INTERNAL MEDICINE

## 2023-12-10 PROCEDURE — 25000242 PHARM REV CODE 250 ALT 637 W/ HCPCS

## 2023-12-10 PROCEDURE — 94761 N-INVAS EAR/PLS OXIMETRY MLT: CPT

## 2023-12-10 PROCEDURE — 97530 THERAPEUTIC ACTIVITIES: CPT

## 2023-12-10 PROCEDURE — 94640 AIRWAY INHALATION TREATMENT: CPT

## 2023-12-10 PROCEDURE — 83735 ASSAY OF MAGNESIUM: CPT

## 2023-12-10 PROCEDURE — 80053 COMPREHEN METABOLIC PANEL: CPT

## 2023-12-10 PROCEDURE — 20000000 HC ICU ROOM

## 2023-12-10 PROCEDURE — 25000242 PHARM REV CODE 250 ALT 637 W/ HCPCS: Performed by: INTERNAL MEDICINE

## 2023-12-10 PROCEDURE — 84100 ASSAY OF PHOSPHORUS: CPT

## 2023-12-10 PROCEDURE — 27100171 HC OXYGEN HIGH FLOW UP TO 24 HOURS

## 2023-12-10 PROCEDURE — 94660 CPAP INITIATION&MGMT: CPT

## 2023-12-10 PROCEDURE — 99233 SBSQ HOSP IP/OBS HIGH 50: CPT | Mod: GC,,, | Performed by: INTERNAL MEDICINE

## 2023-12-10 PROCEDURE — 63600175 PHARM REV CODE 636 W HCPCS

## 2023-12-10 RX ORDER — AMOXICILLIN 250 MG
1 CAPSULE ORAL 2 TIMES DAILY
Status: COMPLETED | OUTPATIENT
Start: 2023-12-10 | End: 2023-12-10

## 2023-12-10 RX ORDER — ALPRAZOLAM 0.5 MG/1
1 TABLET ORAL 3 TIMES DAILY PRN
Status: DISCONTINUED | OUTPATIENT
Start: 2023-12-10 | End: 2023-12-11

## 2023-12-10 RX ADMIN — IPRATROPIUM BROMIDE 0.5 MG: 0.5 SOLUTION RESPIRATORY (INHALATION) at 08:12

## 2023-12-10 RX ADMIN — IPRATROPIUM BROMIDE 0.5 MG: 0.5 SOLUTION RESPIRATORY (INHALATION) at 11:12

## 2023-12-10 RX ADMIN — LEVALBUTEROL 1.25 MG: 1.25 SOLUTION, CONCENTRATE RESPIRATORY (INHALATION) at 11:12

## 2023-12-10 RX ADMIN — METHYLPREDNISOLONE SODIUM SUCCINATE 40 MG: 40 INJECTION, POWDER, FOR SOLUTION INTRAMUSCULAR; INTRAVENOUS at 05:12

## 2023-12-10 RX ADMIN — METHYLPREDNISOLONE SODIUM SUCCINATE 40 MG: 40 INJECTION, POWDER, FOR SOLUTION INTRAMUSCULAR; INTRAVENOUS at 09:12

## 2023-12-10 RX ADMIN — LEVALBUTEROL 1.25 MG: 1.25 SOLUTION, CONCENTRATE RESPIRATORY (INHALATION) at 03:12

## 2023-12-10 RX ADMIN — SENNOSIDES AND DOCUSATE SODIUM 1 TABLET: 50; 8.6 TABLET ORAL at 08:12

## 2023-12-10 RX ADMIN — PANTOPRAZOLE SODIUM 40 MG: 40 TABLET, DELAYED RELEASE ORAL at 08:12

## 2023-12-10 RX ADMIN — IPRATROPIUM BROMIDE 0.5 MG: 0.5 SOLUTION RESPIRATORY (INHALATION) at 07:12

## 2023-12-10 RX ADMIN — NIFEDIPINE 60 MG: 30 TABLET, FILM COATED, EXTENDED RELEASE ORAL at 08:12

## 2023-12-10 RX ADMIN — MUPIROCIN: 20 OINTMENT TOPICAL at 08:12

## 2023-12-10 RX ADMIN — HEPARIN SODIUM 5000 UNITS: 5000 INJECTION INTRAVENOUS; SUBCUTANEOUS at 09:12

## 2023-12-10 RX ADMIN — LEVALBUTEROL 1.25 MG: 1.25 SOLUTION, CONCENTRATE RESPIRATORY (INHALATION) at 08:12

## 2023-12-10 RX ADMIN — LEVALBUTEROL 1.25 MG: 1.25 SOLUTION, CONCENTRATE RESPIRATORY (INHALATION) at 12:12

## 2023-12-10 RX ADMIN — HEPARIN SODIUM 5000 UNITS: 5000 INJECTION INTRAVENOUS; SUBCUTANEOUS at 05:12

## 2023-12-10 RX ADMIN — HEPARIN SODIUM 5000 UNITS: 5000 INJECTION INTRAVENOUS; SUBCUTANEOUS at 01:12

## 2023-12-10 RX ADMIN — IPRATROPIUM BROMIDE 0.5 MG: 0.5 SOLUTION RESPIRATORY (INHALATION) at 12:12

## 2023-12-10 RX ADMIN — LEVALBUTEROL 1.25 MG: 1.25 SOLUTION, CONCENTRATE RESPIRATORY (INHALATION) at 07:12

## 2023-12-10 RX ADMIN — ALPRAZOLAM 1 MG: 0.5 TABLET ORAL at 06:12

## 2023-12-10 RX ADMIN — METHYLPREDNISOLONE SODIUM SUCCINATE 40 MG: 40 INJECTION, POWDER, FOR SOLUTION INTRAMUSCULAR; INTRAVENOUS at 01:12

## 2023-12-10 RX ADMIN — FLUTICASONE FUROATE AND VILANTEROL TRIFENATATE 1 PUFF: 200; 25 POWDER RESPIRATORY (INHALATION) at 08:12

## 2023-12-10 RX ADMIN — IPRATROPIUM BROMIDE 0.5 MG: 0.5 SOLUTION RESPIRATORY (INHALATION) at 03:12

## 2023-12-10 RX ADMIN — POLYETHYLENE GLYCOL 3350 17 G: 17 POWDER, FOR SOLUTION ORAL at 08:12

## 2023-12-10 NOTE — PROGRESS NOTES
[facility] - Cardiac Medical [facility]  Critical Care Medicine  Progress Note    Patient Name: Francois Otero Sr.  MRN: [id number]  Admission Date: 12/5/2023  Hospital Length of Stay: 5 days  Code Status: Full Code  Attending Provider: Rupinder Fraser MD  Primary Care Provider: Carley Kothari MD   Principal Problem: COPD exacerbation    Subjective:     HPI:  78M with PMHx of CAD, hyperlipidemia, hypertension and COPD on 5 L of O2 at home presents to the ED c/o worsening shortness of breath for the last several days worse with exertion. Patient reports chronic dyspnea mostly with exertion, uses oxygen consistently with exertion and at night but does not always use it at rest. Baseline SpO2 88-92%. Reports that for the last several days he has noticed decreased exercise tolerance and worsening shortness of breath. Has prednisone at home for emergencies and reports using prednisone 40mg daily for the last 3 days without improvement in his symptoms. Has had prior COPD exacerbations in the past, and this is similar. Denies having any recent sick contacts. Denies having any other symptoms, including worse cough or fevers. Reports consistent use of his inhalers, but more as rescue inhaler and not daily maintenance inhalers. Pt is prior smoker.     ED course: Afebrile, HDS, tachycardic, requiring BiPAP 10/5 30% and satting >90%. CBC significant for WBC 23k, CMP with mild acidosis, nl renal function, covid/flu/rsv negative, BNP/trop negative, VBG 7.43/37/49/25. CXR with hyperinflated lung, emphysematous changes, no consolidation. Admit to MICU for COPD exacerbation requiring BiPAP.    Hospital/ICU Course:  78M with PMHx of CAD, hyperlipidemia, hypertension and COPD on 5 L of O2 at home presents to the ED c/o worsening shortness of breath for the last several days worse with exertion.  Admitted to MICU for COPD exacerbation requiring BiPAP. Started on solumedrol, duonebs, and levaquin. Working to wean BiPAP and transition  to Comfort Flow. Palliative Care to be consulted for additional assistance with GOC discussions.     Interval History/Significant Events: No acute events overnight. Pt transitioned to LFNC, tolerating well. Still requiring BiPap nightly. Started on scheduled bowel regimen.    Review of Systems   Constitutional:  Positive for activity change. Negative for diaphoresis and fever.   HENT:  Positive for congestion.    Respiratory:  Positive for cough, shortness of breath and wheezing.    Cardiovascular:  Negative for chest pain and leg swelling.   Gastrointestinal:  Negative for abdominal pain, diarrhea, nausea and vomiting.   Genitourinary:  Negative for difficulty urinating.   Skin:  Negative for rash.   Neurological:  Negative for syncope.   Psychiatric/Behavioral:  Negative for agitation.      Objective:     Vital Signs (Most Recent):  Temp: 97.8 °F (36.6 °C) (12/10/23 1100)  Pulse: 90 (12/10/23 1301)  Resp: 19 (12/10/23 1301)  BP: (Abnormal) 176/79 (12/10/23 1300)  SpO2: 95 % (12/10/23 1301) Vital Signs (24h Range):  Temp:  [97.4 °F (36.3 °C)-97.8 °F (36.6 °C)] 97.8 °F (36.6 °C)  Pulse:  [81-90] 90  Resp:  [12-32] 19  SpO2:  [91 %-100 %] 95 %  BP: (124-186)/(71-87) 176/79   Weight: 51.3 kg (113 lb 1.5 oz)  Body mass index is 20.69 kg/m².      Intake/Output Summary (Last 24 hours) at 12/10/2023 1319  Last data filed at 12/10/2023 1242  Gross per 24 hour   Intake 269.34 ml   Output 750 ml   Net -480.66 ml          Physical Exam  Vitals and nursing note reviewed.   Constitutional:       General: He is not in acute distress.     Appearance: He is ill-appearing.      Comments: Tolerating LFNC daily and BiPap at night   HENT:      Head: Atraumatic.      Mouth/Throat:      Mouth: Mucous membranes are dry.   Eyes:      Conjunctiva/sclera: Conjunctivae normal.   Cardiovascular:      Rate and Rhythm: Normal rate and regular rhythm.   Pulmonary:      Effort: No respiratory distress.      Breath sounds: Wheezing and rales  present.      Comments: Accessory muscle use. Diffuse end expiratory wheeze  Abdominal:      Palpations: Abdomen is soft.      Tenderness: There is no abdominal tenderness.   Musculoskeletal:         General: No swelling or tenderness. Normal range of motion.   Skin:     General: Skin is warm and dry.   Neurological:      General: No focal deficit present.      Mental Status: He is alert and oriented to person, place, and time.   Psychiatric:         Mood and Affect: Mood normal.         Behavior: Behavior normal.            Vents:  Oxygen Concentration (%): 44 (12/10/23 0800)  Lines/Drains/Airways       Drain       Name Duration    Male External Urinary Catheter 12/05/23 2200 Small 4 days              Peripheral Intravenous Line       Name Duration         Peripheral IV - Single Lumen 12/05/23 2030 20 G Posterior;Right Forearm 4 days         Peripheral IV - Single Lumen 12/07/23 0900 22 G;1 in Anterior;Left;Proximal Forearm 3 days                  Significant Labs:    CBC/Anemia Profile:  Recent Labs   Lab 12/09/23 0252 12/10/23  0238   WBC 22.84* 19.15*   HGB 15.4 14.7   HCT 47.9 43.6    178   MCV 93 89   RDW 14.8* 14.9*        Chemistries:  Recent Labs   Lab 12/09/23  0252 12/10/23  0238    141   K 4.7 4.9    107   CO2 20* 24   BUN 67* 55*   CREATININE 1.4 1.2   CALCIUM 9.3 9.0   ALBUMIN 3.7 3.5   PROT 7.1 6.7   BILITOT 0.6 0.6   ALKPHOS 90 76   ALT 11 13   AST 19 18   MG 2.8* 2.6   PHOS 3.9 3.0       All pertinent labs within the past 24 hours have been reviewed.    Significant Imaging:  I have reviewed all pertinent imaging results/findings within the past 24 hours.    ABG  Recent Labs   Lab 12/09/23  0823   PH 7.352   PO2 51   PCO2 54.0*   HCO3 30.0*   BE 4*     Assessment/Plan:     Psychiatric  Anxiety  Managed inpatient with Xanax 1mg PRN    Pulmonary  * COPD exacerbation  Shortness of Breath    Because the patient is experiencing an acute worsening in baseline symptoms (such as cough,  dyspnea, and/or sputum production) beyond normal daily variations to an extent that requires a change in therapy, they are in an acute COPD exacerbation.  Patient is is on oxygen at 5 L/min per nasal cannula..  They are currently exhibiting the following signs of a severe exacerbation: accessory respiratory muscle use and paradoxical chest wall movement.  As such, their exacerbation is classified as severe.    Admit to MICU due to his need for continuous BiPAP  Requiring increasing support, now on 18/8. Desats quickly when bipap removed for PO meds this morning  Trend VBGs, most recent 7.28/66/29  Ensure pt is on COPD Pathway and follow Global Initiative for Chronic Obstructive Lung Disease (GOLD) guidelines  Bronchodilators:   Duonebs q4h   Breo qd  Steroids: solumedrol 40mg q8h  Antibiotics: Levaquin 750 mg daily x 5 days.  Would ideally give CAP coverage with Zithromax  + CTX but penicillin is listed as an allergy  Xanax prn, morphine prn for anxiety/dyspnea crisis on BiPAP  Supplemental oxygen with SpO2 goal > 88%  Wean BiPAP to [facility]  Smoking cessation education        Cardiac/Vascular  Hypertension  Listed on problem list, but no antihypertensives listed on home meds list.  SBP > 200 on presentation, down to 140s after 2 mg IV Ativan given.    Losartan 25mg qd, nifedipine 30mg qd  PRN labetalol IV for SBP > 170  Will need close PCP follow-up after DC    Palliative Care  Palliative care encounter  Pt with chronic airway disease and frequent hospitalizations 2/t exacerbations. Pt now requiring increased supplemental O2 requirements while inpatient. If pt is able to be weaned off increased oxygen requirements, he remains a high risk for rehospitalization. Palliative Care consulted for C discussion with pt and family.    Other  History of benzodiazepine use  Anxiety    Prescribed Xanax 0.25 mg QHS PRN for anxiety since at least 2020.  Unable to access  at this time.  Given 1 mg Ativan IV x 2 in ED.    Xanax  1mg prn       Critical Care Daily Checklist:    A: Awake: RASS Goal/Actual Goal: RASS Goal: 0-->alert and calm  Actual:     B: Spontaneous Breathing Trial Performed?     C: SAT & SBT Coordinated?  N/A                      D: Delirium: CAM-ICU Overall CAM-ICU: Negative   E: Early Mobility Performed? Yes   F: Feeding Goal:    Status:     Current Diet Order   Procedures    Diet Adult Regular (IDDSI Level 7)      AS: Analgesia/Sedation PRN meds   T: Thromboembolic Prophylaxis Heparin   H: HOB > 300 Yes   U: Stress Ulcer Prophylaxis (if needed) PPI   G: Glucose Control SSI   B: Bowel Function     I: Indwelling Catheter (Lines & Akhtar) Necessity PIV   D: De-escalation of Antimicrobials/Pharmacotherapies N/A    Plan for the day/ETD GOC    Code Status:  Family/Goals of Care: Full Code  Ongoing       Critical secondary to Patient has a condition that poses threat to life and bodily function: Severe Respiratory Distress      Critical care was time spent personally by me on the following activities: development of treatment plan with patient or surrogate and bedside caregivers, discussions with consultants, evaluation of patient's response to treatment, examination of patient, ordering and performing treatments and interventions, ordering and review of laboratory studies, ordering and review of radiographic studies, pulse oximetry, re-evaluation of patient's condition. This critical care time did not overlap with that of any other provider or involve time for any procedures.     En Larson MD  Critical Care Medicine  [facility] - Cardiac Medical ICU

## 2023-12-10 NOTE — SUBJECTIVE & OBJECTIVE
Interval History/Significant Events: No acute events overnight. Pt transitioned to LFNC, tolerating well. Still requiring BiPap nightly. Started on scheduled bowel regimen.    Review of Systems   Constitutional:  Positive for activity change. Negative for diaphoresis and fever.   HENT:  Positive for congestion.    Respiratory:  Positive for cough, shortness of breath and wheezing.    Cardiovascular:  Negative for chest pain and leg swelling.   Gastrointestinal:  Negative for abdominal pain, diarrhea, nausea and vomiting.   Genitourinary:  Negative for difficulty urinating.   Skin:  Negative for rash.   Neurological:  Negative for syncope.   Psychiatric/Behavioral:  Negative for agitation.      Objective:     Vital Signs (Most Recent):  Temp: 97.8 °F (36.6 °C) (12/10/23 1100)  Pulse: 90 (12/10/23 1301)  Resp: 19 (12/10/23 1301)  BP: (Abnormal) 176/79 (12/10/23 1300)  SpO2: 95 % (12/10/23 1301) Vital Signs (24h Range):  Temp:  [97.4 °F (36.3 °C)-97.8 °F (36.6 °C)] 97.8 °F (36.6 °C)  Pulse:  [81-90] 90  Resp:  [12-32] 19  SpO2:  [91 %-100 %] 95 %  BP: (124-186)/(71-87) 176/79   Weight: 51.3 kg (113 lb 1.5 oz)  Body mass index is 20.69 kg/m².      Intake/Output Summary (Last 24 hours) at 12/10/2023 1319  Last data filed at 12/10/2023 1242  Gross per 24 hour   Intake 269.34 ml   Output 750 ml   Net -480.66 ml          Physical Exam  Vitals and nursing note reviewed.   Constitutional:       General: He is not in acute distress.     Appearance: He is ill-appearing.      Comments: Tolerating LFNC daily and BiPap at night   HENT:      Head: Atraumatic.      Mouth/Throat:      Mouth: Mucous membranes are dry.   Eyes:      Conjunctiva/sclera: Conjunctivae normal.   Cardiovascular:      Rate and Rhythm: Normal rate and regular rhythm.   Pulmonary:      Effort: No respiratory distress.      Breath sounds: Wheezing and rales present.      Comments: Accessory muscle use. Diffuse end expiratory wheeze  Abdominal:      Palpations:  Abdomen is soft.      Tenderness: There is no abdominal tenderness.   Musculoskeletal:         General: No swelling or tenderness. Normal range of motion.   Skin:     General: Skin is warm and dry.   Neurological:      General: No focal deficit present.      Mental Status: He is alert and oriented to person, place, and time.   Psychiatric:         Mood and Affect: Mood normal.         Behavior: Behavior normal.            Vents:  Oxygen Concentration (%): 44 (12/10/23 0800)  Lines/Drains/Airways       Drain       Name Duration    Male External Urinary Catheter 12/05/23 2200 Small 4 days              Peripheral Intravenous Line       Name Duration         Peripheral IV - Single Lumen 12/05/23 2030 20 G Posterior;Right Forearm 4 days         Peripheral IV - Single Lumen 12/07/23 0900 22 G;1 in Anterior;Left;Proximal Forearm 3 days                  Significant Labs:    CBC/Anemia Profile:  Recent Labs   Lab 12/09/23  0252 12/10/23  0238   WBC 22.84* 19.15*   HGB 15.4 14.7   HCT 47.9 43.6    178   MCV 93 89   RDW 14.8* 14.9*        Chemistries:  Recent Labs   Lab 12/09/23  0252 12/10/23  0238    141   K 4.7 4.9    107   CO2 20* 24   BUN 67* 55*   CREATININE 1.4 1.2   CALCIUM 9.3 9.0   ALBUMIN 3.7 3.5   PROT 7.1 6.7   BILITOT 0.6 0.6   ALKPHOS 90 76   ALT 11 13   AST 19 18   MG 2.8* 2.6   PHOS 3.9 3.0       All pertinent labs within the past 24 hours have been reviewed.    Significant Imaging:  I have reviewed all pertinent imaging results/findings within the past 24 hours.

## 2023-12-10 NOTE — PT/OT/SLP EVAL
"Occupational Therapy   Evaluation and Treatment    Name: Francois Otero Sr.  MRN: [id number]  Admitting Diagnosis: COPD exacerbation  Recent Surgery: * No surgery found *      Recommendations:     Discharge Recommendations: Moderate Intensity Therapy  Discharge Equipment Recommendations:  other (see comments) (TBD pending progress)  Barriers to discharge:  Other (Comment), Inaccessible home environment, Decreased caregiver support (impaired cardiopulmonary response to activity)    Assessment:     Francois Otero Sr. is a 78 y.o. male with a medical diagnosis of COPD exacerbation.  Pt demonstrated good participation during session, but he's limited by his impaired cardiopulmonary response to activity and requires assistance with ADLs and mobility.  Due to his current level of function compared to his PLOF and his home environment, moderate intensity therapy recommended at d/c for maximal pt gains in functional independence and to ensure his safety upon returning home.  He presents with the following.  Performance deficits affecting function: weakness, impaired endurance, impaired self care skills, impaired functional mobility, gait instability, impaired balance, impaired cardiopulmonary response to activity.      Rehab Prognosis: Good; patient would benefit from acute skilled OT services to address these deficits and reach maximum level of function.       Plan:     Patient to be seen 4 x/week to address the above listed problems via self-care/home management, therapeutic activities, therapeutic exercises  Plan of Care Expires: 01/08/24  Plan of Care Reviewed with: patient, daughter, son    Subjective   "My legs feel weak."  Chief Complaint: SOB during mobility  Patient/Family Comments/goals: to get stronger    Occupational Profile:  Living Environment: Pt lives alone in a trailer with 4 LUL with HR.  He has a tub/shower combo with no DME.    Previous level of function: Independent with ADLs and mobility.  Pt " drives.  Roles and Routines: father   Equipment Used at Home: oxygen (pt owns but does not use rollator - was his family member's)  Assistance upon Discharge: Pt lives an hour away from his children.  One of them may let him stay at his/her house.      Pain/Comfort:  Pain Rating 1: 0/10 (abdominal pain had subsided)  Pain Rating Post-Intervention 1: 0/10    Patients cultural, spiritual, Gnosticism conflicts given the current situation: no    Objective:     Communicated with: nurse prior to session.  Patient found  seated on bedside commode  with blood pressure cuff, peripheral IV, oxygen, telemetry, pulse ox (continuous), Condom Catheter with his son and nurse present upon OT entry to room.    General Precautions: Standard, fall  Orthopedic Precautions: N/A  Braces: N/A  Respiratory Status: Nasal cannula, flow 7 L/min.  Nurse said pt was on 3 L in bed but desatted to 82% while transferring to commode.  Ended on 4-5 L oxygen.     Occupational Performance:    Bed Mobility:    N/A due to pt sitting on bedside commode at beginning of session and in bedside chair at end of session    Functional Mobility/Transfers:  Patient completed Sit <> Stand Transfer from bedside commode and bedside chair x 1 trial each with minimum assistance  with  hand-held assist   Patient completed Bedside Commode <> Bedside Chair Transfer using Step Transfer technique with minimum assistance with hand-held assist  Functional Mobility: Pt took ~2-3 steps forward and to the R and ~2 steps to the L and backward with Min A with HHA.  He was SOB but reported no dizziness.  Pt's sats ranged from 88% to 94%.     Activities of Daily Living:  Toileting: maximal assistance for perineal care while standing.  Nurse cleaned pt while therapist assisted with standing balance.  He had a BM.     Cognitive/Visual Perceptual:  Cognitive/Psychosocial Skills:     -       Oriented to: Person, Place, Time, and Situation   -       Follows Commands/attention:Follows  multistep  commands  -       Communication: clear/fluent    Physical Exam:  Upper Extremity Range of Motion:     -       Right Upper Extremity: WFL  -       Left Upper Extremity: WFL  Upper Extremity Strength:    -       Right Upper Extremity: WFL  -       Left Upper Extremity: WFL   Strength:    -       Right Upper Extremity: WFL  -       Left Upper Extremity: WFL    AMPAC 6 Click ADL:  [facility] Total Score: 15    Treatment & Education:  Pt and his son and daughter edu on role of OT, POC, deep breathing techniques, safety when performing self care tasks, benefit of performing OOB activity, and safety when performing functional transfers and mobility.    - Self care tasks completed-- as noted above      Patient left up in chair with all lines intact, call button in reach,  and his nurse and son and daughter present    GOALS:   Multidisciplinary Problems       Occupational Therapy Goals          Problem: Occupational Therapy    Goal Priority Disciplines Outcome Interventions   Occupational Therapy Goal     OT, PT/OT Ongoing, Progressing    Description: Goals to be met by: 12/24/2023     Patient will increase functional independence with ADLs by performing:    UE Dressing with Stand-by Assistance.  LE Dressing with Stand-by Assistance.  Grooming while standing at sink with Stand-by Assistance.  Toileting from toilet with Stand-by Assistance for hygiene and clothing management.   Supine to sit with Modified [place].  Step transfer with Stand-by Assistance with LRAD as needed.   Toilet transfer to toilet with Stand-by Assistance with LRAD as needed.                         History:     Past Medical History:   Diagnosis Date    Colon polyp     COPD (chronic obstructive pulmonary disease)     Coronary artery disease     Hyperlipidemia     Hypertension     On home oxygen therapy     Pneumonia due to COVID-19 virus 12/03/2020 12/3/2020    Tobacco abuse     1 PPD X 59 Yrs    Vertigo          Past Surgical History:    Procedure Laterality Date    by pass on leg       CARDIAC CATHETERIZATION      cardiac stents       COLONOSCOPY N/A 9/14/2017    Procedure: COLONOSCOPY;  Surgeon: Robb Yepez MD;  Location: [facility];  Service: Endoscopy;  Laterality: N/A; repeat in 5 years    UPPER GASTROINTESTINAL ENDOSCOPY  09/14/2017    Dr. Yepez       Time Tracking:     OT Date of Treatment: 12/10/23  OT Start Time: 1452  OT Stop Time: 1508  OT Total Time (min): 16 min    Billable Minutes:Evaluation 8 min  Therapeutic Activity 8 min    12/10/2023

## 2023-12-10 NOTE — CONSULTS
Brief consult acknowledgement note    Palliative medicine consult acknowledged, discussed with team, and will be seen on Monday morning unless needed sooner.     Please call palliative MD on call for discussion or immediate assistance.     Thank you for the opportunity to care for this patient and family.     Please call with questions.     Chuck Aceves MD  Palliative Medicine   [facility]  [phone] (cell)

## 2023-12-10 NOTE — ASSESSMENT & PLAN NOTE
Shortness of Breath    Because the patient is experiencing an acute worsening in baseline symptoms (such as cough, dyspnea, and/or sputum production) beyond normal daily variations to an extent that requires a change in therapy, they are in an acute COPD exacerbation.  Patient is is on oxygen at 5 L/min per nasal cannula..  They are currently exhibiting the following signs of a severe exacerbation: accessory respiratory muscle use and paradoxical chest wall movement.  As such, their exacerbation is classified as severe.    Admit to MICU due to his need for continuous BiPAP  Requiring increasing support, now on 18/8. Desats quickly when bipap removed for PO meds this morning  Trend VBGs, most recent 7.28/66/29  Ensure pt is on COPD Pathway and follow Global Initiative for Chronic Obstructive Lung Disease (GOLD) guidelines  Bronchodilators:   Duonebs q4h   Breo qd  Steroids: solumedrol 40mg q8h  Antibiotics: Levaquin 750 mg daily x 5 days.  Would ideally give CAP coverage with Zithromax  + CTX but penicillin is listed as an allergy  Xanax prn, morphine prn for anxiety/dyspnea crisis on BiPAP  Supplemental oxygen with SpO2 goal > 88%  Wean BiPAP to NC  Smoking cessation education

## 2023-12-10 NOTE — ASSESSMENT & PLAN NOTE
Pt with chronic airway disease and frequent hospitalizations 2/t exacerbations. Pt now requiring increased supplemental O2 requirements while inpatient. If pt is able to be weaned off increased oxygen requirements, he remains a high risk for rehospitalization. Palliative Care consulted for GOC discussion with pt and family.

## 2023-12-10 NOTE — PLAN OF CARE
MICU DAILY GOALS     Family/Goals of care/Code Status   Code Status: Full Code    24H Vital Sign Range  Temp:  [96.8 °F (36 °C)-97.9 °F (36.6 °C)]   Pulse:  [81-91]   Resp:  [12-32]   BP: (124-186)/(69-87)   SpO2:  [89 %-100 %]      Shift Events (include procedures and significant events)   No acute events throughout shift    AWAKE RASS: Goal - RASS Goal: 0-->alert and calm  Actual - RASS (Laguerre Agitation-Sedation Scale): alert and calm    Restraint necessity: Not necessary   BREATHE SBT: Not intubated    Coordinate A & B, analgesics/sedatives Pain: managed   SAT: Not intubated   Delirium CAM-ICU: Overall CAM-ICU: Negative   Early(intubated/ Progressive (non-intubated) Mobility MOVE Screen (INTUBATED ONLY): Not intubated    Activity: Activity Management: Rolling - L1   Feeding/Nutrition Diet order: Diet/Nutrition Received: regular,     Thrombus DVT prophylaxis: VTE Required Core Measure: (SCDs) Sequential compression device initiated/maintained   HOB Elevation Head of Bed (HOB) Positioning: HOB at 30-45 degrees   Ulcer Prophylaxis GI: yes   Glucose control managed     Skin Skin assessed during: Daily Assessment    Sacrum intact/not altered? Yes  Heels intact/not altered? Yes  Surgical wound? No    [] No Altered Skin Integrity Present    []Prevention Measures Documented    [] Altered Skin Integrity Present or Discovered   [] LDA present in EPIC              [] LDA added in EPIC   [] Wound Image Taken (required on admit,                   transfer/discharge and every Tuesday)    Wound Care Consulted? No    Attending Nurse:     Second RN/Staff Member:    Bowel Function no issues    Indwelling Catheter Necessity         icu   De-escalation Antibiotics Yes       VS and assessment per flow sheet, patient progressing towards goals as tolerated, plan of care reviewed with [unfilled] and family, all concerns addressed, will continue to monitor.

## 2023-12-10 NOTE — PLAN OF CARE
MICU DAILY GOALS     Family/Goals of care/Code Status   Code Status: Full Code    24H Vital Sign Range  Temp:  [96.8 °F (36 °C)-97.9 °F (36.6 °C)]   Pulse:  [81-95]   Resp:  [12-32]   BP: (128-173)/(62-85)   SpO2:  [89 %-100 %]      Shift Events (include procedures and significant events)   No events occurred     AWAKE RASS: Goal - RASS Goal: 0-->alert and calm  Actual - RASS (Laguerre Agitation-Sedation Scale): alert and calm    Restraint necessity: Not necessary   BREATHE SBT: Not intubated    Coordinate A & B, analgesics/sedatives Pain: managed   SAT: Not intubated   Delirium CAM-ICU: Overall CAM-ICU: Negative   Early(intubated/ Progressive (non-intubated) Mobility MOVE Screen (INTUBATED ONLY): Not intubated    Activity: Activity Management: Rolling - L1   Feeding/Nutrition Diet order: Diet/Nutrition Received: regular,     Thrombus DVT prophylaxis: VTE Required Core Measure: Pharmacological prophylaxis initiated/maintained   HOB Elevation Head of Bed (HOB) Positioning: HOB at 30-45 degrees   Ulcer Prophylaxis GI: yes   Glucose control managed     Skin Skin assessed during: Daily Assessment    Sacrum intact/not altered? Yes  Heels intact/not altered? Yes  Surgical wound? No    [] No Altered Skin Integrity Present    []Prevention Measures Documented    [] Altered Skin Integrity Present or Discovered   [] LDA present in EPIC              [] LDA added in EPIC   [] Wound Image Taken (required on admit,                   transfer/discharge and every Tuesday)    Wound Care Consulted? No    Attending Nurse:     Second RN/Staff Member:    Bowel Function no issues    Indwelling Catheter Necessity         Clinically necessary, ICU   De-escalation Antibiotics Yes       VS and assessment per flow sheet, patient progressing towards goals as tolerated, plan of care reviewed with [unfilled] and family, all concerns addressed, will continue to monitor.

## 2023-12-10 NOTE — PLAN OF CARE
Problem: Occupational Therapy  Goal: Occupational Therapy Goal  Description: Goals to be met by: 12/24/2023     Patient will increase functional independence with ADLs by performing:    UE Dressing with Stand-by Assistance.  LE Dressing with Stand-by Assistance.  Grooming while standing at sink with Stand-by Assistance.  Toileting from toilet with Stand-by Assistance for hygiene and clothing management.   Supine to sit with Modified [place].  Step transfer with Stand-by Assistance with LRAD as needed.   Toilet transfer to toilet with Stand-by Assistance with LRAD as needed.    Outcome: Ongoing, Progressing     Pt evaluated and OT goals established.

## 2023-12-11 LAB
ALBUMIN SERPL BCP-MCNC: 3.6 G/DL (ref 3.5–5.2)
ALP SERPL-CCNC: 84 U/L (ref 55–135)
ALT SERPL W/O P-5'-P-CCNC: 16 U/L (ref 10–44)
ANION GAP SERPL CALC-SCNC: 10 MMOL/L (ref 8–16)
AST SERPL-CCNC: 18 U/L (ref 10–40)
BASOPHILS # BLD AUTO: ABNORMAL K/UL (ref 0–0.2)
BASOPHILS NFR BLD: 0 % (ref 0–1.9)
BILIRUB SERPL-MCNC: 0.5 MG/DL (ref 0.1–1)
BUN SERPL-MCNC: 54 MG/DL (ref 8–23)
CALCIUM SERPL-MCNC: 9.3 MG/DL (ref 8.7–10.5)
CHLORIDE SERPL-SCNC: 105 MMOL/L (ref 95–110)
CO2 SERPL-SCNC: 23 MMOL/L (ref 23–29)
CREAT SERPL-MCNC: 1 MG/DL (ref 0.5–1.4)
DIFFERENTIAL METHOD: ABNORMAL
EOSINOPHIL # BLD AUTO: ABNORMAL K/UL (ref 0–0.5)
EOSINOPHIL NFR BLD: 0 % (ref 0–8)
ERYTHROCYTE [DISTWIDTH] IN BLOOD BY AUTOMATED COUNT: 15 % (ref 11.5–14.5)
EST. GFR  (NO RACE VARIABLE): >60 ML/MIN/1.73 M^2
GLUCOSE SERPL-MCNC: 156 MG/DL (ref 70–110)
HCT VFR BLD AUTO: 46 % (ref 40–54)
HGB BLD-MCNC: 15.3 G/DL (ref 14–18)
IMM GRANULOCYTES # BLD AUTO: ABNORMAL K/UL (ref 0–0.04)
IMM GRANULOCYTES NFR BLD AUTO: ABNORMAL % (ref 0–0.5)
LYMPHOCYTES # BLD AUTO: ABNORMAL K/UL (ref 1–4.8)
LYMPHOCYTES NFR BLD: 41 % (ref 18–48)
MAGNESIUM SERPL-MCNC: 2.7 MG/DL (ref 1.6–2.6)
MCH RBC QN AUTO: 30.7 PG (ref 27–31)
MCHC RBC AUTO-ENTMCNC: 33.3 G/DL (ref 32–36)
MCV RBC AUTO: 92 FL (ref 82–98)
MONOCYTES # BLD AUTO: ABNORMAL K/UL (ref 0.3–1)
MONOCYTES NFR BLD: 6 % (ref 4–15)
NEUTROPHILS NFR BLD: 53 % (ref 38–73)
NRBC BLD-RTO: 0 /100 WBC
PHOSPHATE SERPL-MCNC: 3 MG/DL (ref 2.7–4.5)
PLATELET # BLD AUTO: 167 K/UL (ref 150–450)
PLATELET BLD QL SMEAR: ABNORMAL
PMV BLD AUTO: 10.1 FL (ref 9.2–12.9)
POTASSIUM SERPL-SCNC: 4.5 MMOL/L (ref 3.5–5.1)
PROT SERPL-MCNC: 6.6 G/DL (ref 6–8.4)
RBC # BLD AUTO: 4.99 M/UL (ref 4.6–6.2)
SODIUM SERPL-SCNC: 138 MMOL/L (ref 136–145)
WBC # BLD AUTO: 22.18 K/UL (ref 3.9–12.7)

## 2023-12-11 PROCEDURE — 84100 ASSAY OF PHOSPHORUS: CPT

## 2023-12-11 PROCEDURE — 99233 PR SUBSEQUENT HOSPITAL CARE,LEVL III: ICD-10-PCS | Mod: GC,,, | Performed by: INTERNAL MEDICINE

## 2023-12-11 PROCEDURE — 97161 PT EVAL LOW COMPLEX 20 MIN: CPT

## 2023-12-11 PROCEDURE — 25000003 PHARM REV CODE 250

## 2023-12-11 PROCEDURE — 94640 AIRWAY INHALATION TREATMENT: CPT

## 2023-12-11 PROCEDURE — 97530 THERAPEUTIC ACTIVITIES: CPT

## 2023-12-11 PROCEDURE — 99223 PR INITIAL HOSPITAL CARE,LEVL III: ICD-10-PCS | Mod: ,,, | Performed by: INTERNAL MEDICINE

## 2023-12-11 PROCEDURE — 25000242 PHARM REV CODE 250 ALT 637 W/ HCPCS: Performed by: INTERNAL MEDICINE

## 2023-12-11 PROCEDURE — 94761 N-INVAS EAR/PLS OXIMETRY MLT: CPT

## 2023-12-11 PROCEDURE — 99497 ADVNCD CARE PLAN 30 MIN: CPT | Mod: 25,,, | Performed by: INTERNAL MEDICINE

## 2023-12-11 PROCEDURE — 83735 ASSAY OF MAGNESIUM: CPT

## 2023-12-11 PROCEDURE — 27100171 HC OXYGEN HIGH FLOW UP TO 24 HOURS

## 2023-12-11 PROCEDURE — 20600001 HC STEP DOWN PRIVATE ROOM

## 2023-12-11 PROCEDURE — 85027 COMPLETE CBC AUTOMATED: CPT

## 2023-12-11 PROCEDURE — 63600175 PHARM REV CODE 636 W HCPCS

## 2023-12-11 PROCEDURE — 99900035 HC TECH TIME PER 15 MIN (STAT)

## 2023-12-11 PROCEDURE — 97116 GAIT TRAINING THERAPY: CPT

## 2023-12-11 PROCEDURE — 99497 PR ADVNCD CARE PLAN 30 MIN: ICD-10-PCS | Mod: 25,,, | Performed by: INTERNAL MEDICINE

## 2023-12-11 PROCEDURE — 99223 1ST HOSP IP/OBS HIGH 75: CPT | Mod: ,,, | Performed by: INTERNAL MEDICINE

## 2023-12-11 PROCEDURE — 80053 COMPREHEN METABOLIC PANEL: CPT

## 2023-12-11 PROCEDURE — 25000242 PHARM REV CODE 250 ALT 637 W/ HCPCS

## 2023-12-11 PROCEDURE — 94660 CPAP INITIATION&MGMT: CPT

## 2023-12-11 PROCEDURE — 97535 SELF CARE MNGMENT TRAINING: CPT

## 2023-12-11 PROCEDURE — 99233 SBSQ HOSP IP/OBS HIGH 50: CPT | Mod: GC,,, | Performed by: INTERNAL MEDICINE

## 2023-12-11 PROCEDURE — 85007 BL SMEAR W/DIFF WBC COUNT: CPT

## 2023-12-11 PROCEDURE — 25000003 PHARM REV CODE 250: Performed by: INTERNAL MEDICINE

## 2023-12-11 RX ORDER — ALPRAZOLAM 0.5 MG/1
0.5 TABLET ORAL 3 TIMES DAILY PRN
Status: DISCONTINUED | OUTPATIENT
Start: 2023-12-11 | End: 2023-12-20 | Stop reason: HOSPADM

## 2023-12-11 RX ORDER — OXYCODONE HCL 5 MG/5 ML
2 SOLUTION, ORAL ORAL EVERY 4 HOURS PRN
Status: DISCONTINUED | OUTPATIENT
Start: 2023-12-11 | End: 2023-12-20 | Stop reason: HOSPADM

## 2023-12-11 RX ADMIN — METHYLPREDNISOLONE SODIUM SUCCINATE 40 MG: 40 INJECTION, POWDER, FOR SOLUTION INTRAMUSCULAR; INTRAVENOUS at 05:12

## 2023-12-11 RX ADMIN — IPRATROPIUM BROMIDE 0.5 MG: 0.5 SOLUTION RESPIRATORY (INHALATION) at 07:12

## 2023-12-11 RX ADMIN — HEPARIN SODIUM 5000 UNITS: 5000 INJECTION INTRAVENOUS; SUBCUTANEOUS at 02:12

## 2023-12-11 RX ADMIN — PANTOPRAZOLE SODIUM 40 MG: 40 TABLET, DELAYED RELEASE ORAL at 10:12

## 2023-12-11 RX ADMIN — IPRATROPIUM BROMIDE 0.5 MG: 0.5 SOLUTION RESPIRATORY (INHALATION) at 11:12

## 2023-12-11 RX ADMIN — LEVALBUTEROL 1.25 MG: 1.25 SOLUTION, CONCENTRATE RESPIRATORY (INHALATION) at 11:12

## 2023-12-11 RX ADMIN — METHYLPREDNISOLONE SODIUM SUCCINATE 40 MG: 40 INJECTION, POWDER, FOR SOLUTION INTRAMUSCULAR; INTRAVENOUS at 09:12

## 2023-12-11 RX ADMIN — IPRATROPIUM BROMIDE 0.5 MG: 0.5 SOLUTION RESPIRATORY (INHALATION) at 03:12

## 2023-12-11 RX ADMIN — LEVALBUTEROL 1.25 MG: 1.25 SOLUTION, CONCENTRATE RESPIRATORY (INHALATION) at 03:12

## 2023-12-11 RX ADMIN — ALPRAZOLAM 0.5 MG: 0.5 TABLET ORAL at 09:12

## 2023-12-11 RX ADMIN — NIFEDIPINE 60 MG: 30 TABLET, FILM COATED, EXTENDED RELEASE ORAL at 10:12

## 2023-12-11 RX ADMIN — HEPARIN SODIUM 5000 UNITS: 5000 INJECTION INTRAVENOUS; SUBCUTANEOUS at 05:12

## 2023-12-11 RX ADMIN — METHYLPREDNISOLONE SODIUM SUCCINATE 40 MG: 40 INJECTION, POWDER, FOR SOLUTION INTRAMUSCULAR; INTRAVENOUS at 02:12

## 2023-12-11 RX ADMIN — ALPRAZOLAM 1 MG: 0.5 TABLET ORAL at 12:12

## 2023-12-11 RX ADMIN — HEPARIN SODIUM 5000 UNITS: 5000 INJECTION INTRAVENOUS; SUBCUTANEOUS at 09:12

## 2023-12-11 RX ADMIN — FLUTICASONE FUROATE AND VILANTEROL TRIFENATATE 1 PUFF: 200; 25 POWDER RESPIRATORY (INHALATION) at 07:12

## 2023-12-11 RX ADMIN — LEVALBUTEROL 1.25 MG: 1.25 SOLUTION, CONCENTRATE RESPIRATORY (INHALATION) at 07:12

## 2023-12-11 NOTE — PT/OT/SLP EVAL
Physical Therapy Co-Evaluation with OT and Treatment     Patient Name:  Francois Otero Sr.  MRN: [id number]    Admit Date: 12/5/2023  Admitting Diagnosis:  COPD exacerbation  Length of Stay: 6 days  Recent Surgery: * No surgery found *      Recommendations:     Discharge Recommendations: Moderate therapy intensity  Equipment recommendations: none  Barriers to discharge: Decreased caregiver support available  and Increased level of skilled assistance required    Assessment:     Francois Otero Sr. is a 78 y.o. male admitted to [facility] on [place]/5/2023 with medical diagnosis of COPD exacerbation. Pt presents with weakness, impaired endurance, impaired functional mobility, impaired balance, gait instability, decreased coordination, impaired cardiopulmonary response to activity. These deficits effect their roles and responsibilities in which they were able to complete prior to admit.     Pt is agreeable to therapy evaluation and session. Daughter at bedside. Pt states he was (I) PTA. Pt able to sit eob, stand and ambulate to sink to perform standing ADLs. Sats > 95% throughout entire session but accessory muscle breathing noted. Pt left sitting up in recliner with all VSS.     Francois Otero Sr. would benefit from acute PT intervention to improve quality of life, focus on recovery of impairments, provide patient/caregiver education, reduce fall risk, and maximize (I) and safety with functional mobility. Once medically stable, recommending pt discharge to moderate therapy intensity. Patient currently demonstrates a need for moderate intensity therapy on a daily basis post acute secondary to a decline in functional status due to illness .      Rehab Prognosis: Good    Plan:     During this hospitalization, patient to be seen 4 x/week to address the identified rehab impairments via gait training, therapeutic activities, therapeutic exercises, neuromuscular re-education and progress towards stated goals.     Plan of Care Expires:   01/10/24  Plan of Care reviewed with: patient, daughter    This plan of care has been discussed with the patient/caregiver, who was included in its development and is in agreement with the identified goals and treatment plan.     Subjective     Communicated with RN prior to session.  Patient found supine upon PT entry to room, agreeable to evaluation. Pt's daughter present during session.    Chief Complaint: none stated    Patient/Family Comments/goals: none stated    Pain/Comfort:  Pain Rating 1: 0/10  Pain Rating Post-Intervention 1: 0/10    Patients cultural, spiritual, Congregation conflicts given the current situation: None identified     Patient History: information obtained from pt     Living Environment: Pt lives alone in mobile home  with 4 LUL with unilateral HR. Bathroom set-up: tub/shower combo  Prior Level of Function: independent with mobility and ADLs; 3-5 L home O2 as needed  DME owned:  rollator, oxygen  Support Available/Caregiver Assistance:  daughter (lives 1 hour away so unclear level of support)    Objective:   OT present for cotreat due to pt's multiple medical comorbidities and functional/cognition deficits requiring two skilled therapists to appropriately progress pt's musculoskeletal strength, neuromuscular control, and endurance while taking into consideration medical acuity and pt safety.    Patient found with: blood pressure cuff, pulse ox (continuous), telemetry, oxygen, Condom Catheter    Recent Surgery: * No surgery found *    General Precautions: Standard, fall   Orthopedic Precautions:    Braces:     Oxygen Device: nasal cannula 4L      Exams:    Cognition:  Alert  Command following: Follows multistep verbal commands  Communication: clear/fluent    Sensation:   Light touch sensation: Intact BLEs    Gross Motor Coordination: No deficits noted during functional mobility tasks     Edema/Skin Integrity: None noted; Visible skin intact    Postural examination/scapula alignment:  no  deficits noted    Lower Extremity Range of Motion:  Right Lower Extremity: WFL  Left Lower Extremity: WFL    Lower Extremity Strength:  Right Lower Extremity: WFL  Left Lower Extremity: WFL    Functional Mobility:    Bed Mobility:  Scooting with stand by assistance  Supine > Sit with stand by assistance    Transfers:   Sit <> Stand Transfer: Minimal Assistance x 1 trials from eob with no AD              Gait:  Distance: 8 ft x2 (standing ADLs between)  Assistance level: Contact Guard Assistance  Assistive Device: HHA  Gait Assessment: decreased step length , decreased juan manuel, and decreased gait speed    Balance:  Dynamic Sitting: FAIR+: Maintains balance through MINIMAL excursions of active trunk motion  Standing:  Static: FAIR: Maintains without assist but unable to take challenges   Dynamic: FAIR: Needs CONTACT GUARD during gait    Outcome Measure: AM-PAC 6 CLICK MOBILITY  Total Score:17     Patient/Caregiver Education:     Therapist educated pt/caregiver regarding:   PT POC and goals for therapy   Safety with mobility and fall risk   Instruction on use of call button and importance of calling nursing staff for assistance with mobility   Time provided for therapeutic counseling and discussion of current health disposition. All questions answered to satisfaction, within scope of PT practice     Patient/caregiver able to verbalize understanding and expressed no further questions this visit; will follow-up with pt/caregiver during current admit for additional questions/concerns within scope of practice.     White board updated.     Patient left up in chair with all lines intact and call button in reach.    GOALS:   Multidisciplinary Problems       Physical Therapy Goals          Problem: Physical Therapy    Goal Priority Disciplines Outcome Goal Variances Interventions   Physical Therapy Goal     PT, PT/OT Ongoing, Progressing     Description: Goals to be met by: 12/25/23     Patient will increase functional  independence with mobility by performin. Supine to sit with [place]  2. Sit to supine with [place]  3. Sit to stand transfer with Supervision  4. Bed to chair transfer with Supervision using No Assistive Device  5. Gait  x 150 feet with Supervision using No Assistive Device.   6. Ascend/descend 4 stair with unilateral handrails Supervision using No Assistive Device.                            History:     Past Medical History:   Diagnosis Date    Colon polyp     COPD (chronic obstructive pulmonary disease)     Coronary artery disease     Hyperlipidemia     Hypertension     On home oxygen therapy     Pneumonia due to COVID-19 virus 2020 12/3/2020    Tobacco abuse     1 PPD X 59 Yrs    Vertigo        Past Surgical History:   Procedure Laterality Date    by pass on leg       CARDIAC CATHETERIZATION      cardiac stents       COLONOSCOPY N/A 2017    Procedure: COLONOSCOPY;  Surgeon: Robb Yepez MD;  Location: [facility];  Service: Endoscopy;  Laterality: N/A; repeat in 5 years    UPPER GASTROINTESTINAL ENDOSCOPY  2017    Dr. Yepez       Time Tracking:     PT Received On: 23  PT Start Time: 1001     PT Stop Time: 1024  PT Total Time (min): 23 min     Billable Minutes: Evaluation 8 and Gait Training 15    2023

## 2023-12-11 NOTE — HOSPITAL COURSE
Admitted to MICU for COPD exacerbation requiring BiPAP. Started on solumedrol, duonebs, and levaquin. Working to wean BiPAP and transition to Comfort Flow. Palliative Care to be consulted for additional assistance with GOC discussions.  Patient steroids tapered as symptoms allow along with nebulizers.  PT/OT consulted given patient's increased dyspnea symptoms limiting his physical conditioning.  Patient with worsening abdominal pain overnight refractory to NG tube placement.  CT abdomen/pelvis notable for significant urinary retention.  Jennings catheter placed with improvement of patient's abdominal pain and respiratory status with 1300 cc removed.  NG tube removed the following day.  Jennings catheter accidentally removed and nursing with difficulty replacing.  Jennings then placed by Urology and patient started on tamsulosin.  Outpatient urology follow-up needed. Abdominal symptoms relieved following jennings placement. Oxygen weaning. Patient deemed ready for discharge. Insurance did not approve SNF. Patient and family wanted to be discharged with hh. Plan discussed with pt, who was agreeable and amenable; medications were discussed and reviewed, outpatient follow-up arranged, ER precautions were given, all questions were answered to the pt's satisfaction, and Francois Otero Sr.  was subsequently discharged.

## 2023-12-11 NOTE — SUBJECTIVE & OBJECTIVE
"Interval History/Significant Events: ***    Review of Systems  Objective:     Vital Signs (Most Recent):  Temp: 97.6 °F (36.4 °C) (12/11/23 1200)  Pulse: 94 (12/11/23 1400)  Resp: (!) 27 (12/11/23 1400)  BP: (!) 188/86 (12/11/23 1400)  SpO2: 98 % (12/11/23 1400) Vital Signs (24h Range):  Temp:  [97.6 °F (36.4 °C)-98.2 °F (36.8 °C)] 97.6 °F (36.4 °C)  Pulse:  [] 94  Resp:  [14-28] 27  SpO2:  [82 %-98 %] 98 %  BP: (140-190)/(66-89) 188/86   Weight: 49.7 kg (109 lb 9.1 oz)  Body mass index is 20.04 kg/m².      Intake/Output Summary (Last 24 hours) at 12/11/2023 1446  Last data filed at 12/11/2023 1400  Gross per 24 hour   Intake 540 ml   Output 1650 ml   Net -1110 ml          Physical Exam       Vents:  Oxygen Concentration (%): 30 (12/11/23 0319)  Lines/Drains/Airways       Drain  Duration             Male External Urinary Catheter 12/05/23 2200 Small 5 days              Peripheral Intravenous Line  Duration                  Peripheral IV - Single Lumen 12/07/23 0900 22 G;1 in Anterior;Left;Proximal Forearm 4 days         Peripheral IV - Single Lumen 12/11/23 0916 20 G Anterior;Distal;Right Upper Arm <1 day                  Significant Labs:    CBC/Anemia Profile:  Recent Labs   Lab 12/10/23  0238 12/11/23  0344   WBC 19.15* 22.18*   HGB 14.7 15.3   HCT 43.6 46.0    167   MCV 89 92   RDW 14.9* 15.0*        Chemistries:  Recent Labs   Lab 12/10/23  0238 12/11/23  0344    138   K 4.9 4.5    105   CO2 24 23   BUN 55* 54*   CREATININE 1.2 1.0   CALCIUM 9.0 9.3   ALBUMIN 3.5 3.6   PROT 6.7 6.6   BILITOT 0.6 0.5   ALKPHOS 76 84   ALT 13 16   AST 18 18   MG 2.6 2.7*   PHOS 3.0 3.0       {Results:64328}    Significant Imaging:  {Imaging Review:57330::"I have reviewed all pertinent imaging results/findings within the past 24 hours."}  "

## 2023-12-11 NOTE — PT/OT/SLP PROGRESS
Occupational Therapy  Co Treatment    Name: Francois Otero Sr.  MRN: [id number]  Admitting Diagnosis:  COPD exacerbation       Recommendations:     Discharge Recommendations: Moderate Intensity Therapy      Assessment:     Francois Otero Sr. is a 78 y.o. male with a medical diagnosis of COPD exacerbation. Performance deficits affecting function are impaired endurance, weakness, impaired self care skills, impaired functional mobility, gait instability, impaired balance. Pt tolerated session fairy well. Pt did have improved tolerance for activity this date with stable oxygen saturation.     Rehab Prognosis:  Good; patient would benefit from acute skilled OT services to address these deficits and reach maximum level of function.       Plan:     Patient to be seen 4 x/week to address the above listed problems via self-care/home management, therapeutic activities  Plan of Care Expires: 01/08/24  Plan of Care Reviewed with: patient, daughter    Subjective     Pt agreeable to therapy   Pain/Comfort:  Pain Rating 1: 0/10    Objective:     Communicated with: nsg  prior to session.  Patient found in bed with 4 LPM oxygen, tele, pulse ox, BP cuff, condom cath  Daughter in room.   Cotx completed this date to optimize functional performance and safety given impaired tolerance for activity in setting of ICU   General Precautions: Standard, fall      Occupational Performance:     Bed Mobility:    Supine>sit with SBA     Functional Mobility/Transfers:  Sit>Stand with MIN A from bed and CGA from BSC.   Pt completed functional  mobility in room with MIN A /HHA     Activities of Daily Living:  Feeding: set-up  G/H standing with CGA for self care with good B UE integration and adequate standing balance/endurance.   Toileting; MAX A     AMPAC 6 Click ADL: 14    Treatment & Education:  Pt demo SBA for seated postural control and required HHA for standing balance/mobility.   Vital signs monitored and remained stable. No SOB noted; however, pt  using accessory mm for breathing. Saturation remaining >95% throughout session.   Education provided re: role of OT and safety with functional mobility/ADL skills.     Patient left in chair with lines intact, needs in reach and daughter present. Nsg notified.     GOALS:   Multidisciplinary Problems       Occupational Therapy Goals          Problem: Occupational Therapy    Goal Priority Disciplines Outcome Interventions   Occupational Therapy Goal     OT, PT/OT Ongoing, Progressing    Description: Goals to be met by: 12/24/2023     Patient will increase functional independence with ADLs by performing:    UE Dressing with Stand-by Assistance.  LE Dressing with Stand-by Assistance.  Grooming while standing at sink with Stand-by Assistance.  Toileting from toilet with Stand-by Assistance for hygiene and clothing management.   Supine to sit with Modified [place].  Step transfer with Stand-by Assistance with LRAD as needed.   Toilet transfer to toilet with Stand-by Assistance with LRAD as needed.                         Time Tracking:     OT Date of Treatment: 12/11/23  OT Start Time: 1000  OT Stop Time: 1023  OT Total Time (min): 23 min    Billable Minutes:Self Care/Home Management 8  Therapeutic Activity 15    OT/REGI: OT          12/11/2023

## 2023-12-11 NOTE — PLAN OF CARE
PT evaluation complete - see note for details. POC and goals established.    Problem: Physical Therapy  Goal: Physical Therapy Goal  Description: Goals to be met by: 23     Patient will increase functional independence with mobility by performin. Supine to sit with [place]  2. Sit to supine with [place]  3. Sit to stand transfer with Supervision  4. Bed to chair transfer with Supervision using No Assistive Device  5. Gait  x 150 feet with Supervision using No Assistive Device.   6. Ascend/descend 4 stair with unilateral handrails Supervision using No Assistive Device.     Outcome: Ongoing, Progressing     2023

## 2023-12-11 NOTE — HPI
Francois Otero Sr. is a 78M with PMHx of CAD, hyperlipidemia, hypertension and COPD on 5 L of O2 at home presents to the ED c/o worsening shortness of breath for the last several days worse with exertion. Patient reports chronic dyspnea mostly with exertion, uses oxygen consistently with exertion and at night but does not always use it at rest. Baseline SpO2 88-92%. Reports that for the last several days he has noticed decreased exercise tolerance and worsening shortness of breath. Has prednisone at home for emergencies and reports using prednisone 40mg daily for the last 3 days without improvement in his symptoms. Has had prior COPD exacerbations in the past, and this is similar. Denies having any recent sick contacts. Denies having any other symptoms, including worse cough or fevers. Reports consistent use of his inhalers, but more as rescue inhaler and not daily maintenance inhalers. Pt is prior smoker.      ED course: Afebrile, HDS, tachycardic, requiring BiPAP 10/5 30% and satting >90%. CBC significant for WBC 23k, CMP with mild acidosis, nl renal function, covid/flu/rsv negative, BNP/trop negative, VBG 7.43/37/49/25. CXR with hyperinflated lung, emphysematous changes, no consolidation. Admit to MICU for COPD exacerbation requiring BiPAP.

## 2023-12-11 NOTE — PROGRESS NOTES
[facility]  Critical Care Medicine  Progress Note    Patient Name: Francois Otero Sr.  MRN: [id number]  Admission Date: 12/5/2023  Hospital Length of Stay: 6 days  Code Status: DNR  Attending Provider: Roe Boswell*  Primary Care Provider: Carley Kothari MD   Principal Problem: COPD exacerbation    Subjective:     HPI:  78M with PMHx of CAD, hyperlipidemia, hypertension and COPD on 5 L of O2 at home presents to the ED c/o worsening shortness of breath for the last several days worse with exertion. Patient reports chronic dyspnea mostly with exertion, uses oxygen consistently with exertion and at night but does not always use it at rest. Baseline SpO2 88-92%. Reports that for the last several days he has noticed decreased exercise tolerance and worsening shortness of breath. Has prednisone at home for emergencies and reports using prednisone 40mg daily for the last 3 days without improvement in his symptoms. Has had prior COPD exacerbations in the past, and this is similar. Denies having any recent sick contacts. Denies having any other symptoms, including worse cough or fevers. Reports consistent use of his inhalers, but more as rescue inhaler and not daily maintenance inhalers. Pt is prior smoker.     ED course: Afebrile, HDS, tachycardic, requiring BiPAP 10/5 30% and satting >90%. CBC significant for WBC 23k, CMP with mild acidosis, nl renal function, covid/flu/rsv negative, BNP/trop negative, VBG 7.43/37/49/25. CXR with hyperinflated lung, emphysematous changes, no consolidation. Admit to MICU for COPD exacerbation requiring BiPAP.    Hospital/ICU Course:  78M with PMHx of CAD, hyperlipidemia, hypertension and COPD on 5 L of O2 at home presents to the ED c/o worsening shortness of breath for the last several days worse with exertion.  Admitted to MICU for COPD exacerbation requiring BiPAP. Started on solumedrol, duonebs, and levaquin. Working to wean BiPAP and transition to  Comfort Flow. Palliative Care to be consulted for additional assistance with GOC discussions.     Interval History/Significant Events: No acute events overnight. Using BiPap nightly. Having multiple bowel movements.     Review of Systems   Constitutional:  Positive for activity change. Negative for diaphoresis and fever.   HENT:  Positive for congestion.    Respiratory:  Positive for cough, shortness of breath and wheezing.    Cardiovascular:  Negative for chest pain and leg swelling.   Gastrointestinal:  Negative for abdominal pain, diarrhea, nausea and vomiting.   Genitourinary:  Negative for difficulty urinating.   Skin:  Negative for rash.   Neurological:  Negative for syncope.   Psychiatric/Behavioral:  Negative for agitation.      Objective:     Vital Signs (Most Recent):  Temp: 97.9 °F (36.6 °C) (12/11/23 0800)  Pulse: 99 (12/11/23 1200)  Resp: (!) 23 (12/11/23 1200)  BP: (!) 176/83 (12/11/23 1200)  SpO2: 97 % (12/11/23 1200) Vital Signs (24h Range):  Temp:  [97.6 °F (36.4 °C)-98.2 °F (36.8 °C)] 97.9 °F (36.6 °C)  Pulse:  [] 99  Resp:  [14-28] 23  SpO2:  [82 %-98 %] 97 %  BP: (140-190)/(66-89) 176/83   Weight: 49.7 kg (109 lb 9.1 oz)  Body mass index is 20.04 kg/m².      Intake/Output Summary (Last 24 hours) at 12/11/2023 1245  Last data filed at 12/11/2023 1200  Gross per 24 hour   Intake 480 ml   Output 1300 ml   Net -820 ml            Physical Exam  Vitals and nursing note reviewed.   Constitutional:       General: He is not in acute distress.     Appearance: He is ill-appearing.      Comments: Tolerating LFNC daily and BiPap at night   HENT:      Head: Atraumatic.      Mouth/Throat:      Mouth: Mucous membranes are dry.   Eyes:      Conjunctiva/sclera: Conjunctivae normal.   Cardiovascular:      Rate and Rhythm: Normal rate and regular rhythm.   Pulmonary:      Effort: No respiratory distress.      Breath sounds: Wheezing and rales present.      Comments: Accessory muscle use. Diffuse end expiratory  wheeze  Abdominal:      Palpations: Abdomen is soft.      Tenderness: There is no abdominal tenderness.   Musculoskeletal:         General: No swelling or tenderness. Normal range of motion.   Skin:     General: Skin is warm and dry.   Neurological:      General: No focal deficit present.      Mental Status: He is alert and oriented to person, place, and time.   Psychiatric:         Mood and Affect: Mood normal.         Behavior: Behavior normal.            Vents:  Oxygen Concentration (%): 30 (12/11/23 0319)  Lines/Drains/Airways       Drain  Duration             Male External Urinary Catheter 12/05/23 2200 Small 5 days              Peripheral Intravenous Line  Duration                  Peripheral IV - Single Lumen 12/07/23 0900 22 G;1 in Anterior;Left;Proximal Forearm 4 days         Peripheral IV - Single Lumen 12/11/23 0916 20 G Anterior;Distal;Right Upper Arm <1 day                  Significant Labs:    CBC/Anemia Profile:  Recent Labs   Lab 12/10/23  0238 12/11/23  0344   WBC 19.15* 22.18*   HGB 14.7 15.3   HCT 43.6 46.0    167   MCV 89 92   RDW 14.9* 15.0*          Chemistries:  Recent Labs   Lab 12/10/23  0238 12/11/23  0344    138   K 4.9 4.5    105   CO2 24 23   BUN 55* 54*   CREATININE 1.2 1.0   CALCIUM 9.0 9.3   ALBUMIN 3.5 3.6   PROT 6.7 6.6   BILITOT 0.6 0.5   ALKPHOS 76 84   ALT 13 16   AST 18 18   MG 2.6 2.7*   PHOS 3.0 3.0         All pertinent labs within the past 24 hours have been reviewed.    Significant Imaging:  I have reviewed all pertinent imaging results/findings within the past 24 hours.    ABG  Recent Labs   Lab 12/09/23  0823   PH 7.352   PO2 51   PCO2 54.0*   HCO3 30.0*   BE 4*     Assessment/Plan:     Psychiatric  Anxiety  Managed inpatient with Xanax 1mg PRN    Pulmonary  * COPD exacerbation  Shortness of Breath    Because the patient is experiencing an acute worsening in baseline symptoms (such as cough, dyspnea, and/or sputum production) beyond normal daily  variations to an extent that requires a change in therapy, they are in an acute COPD exacerbation.  Patient is is on oxygen at 5 L/min per nasal cannula..  They are currently exhibiting the following signs of a severe exacerbation: accessory respiratory muscle use and paradoxical chest wall movement.  As such, their exacerbation is classified as severe.    Admit to MICU due to his need for continuous BiPAP  Requiring increasing support, now on 18/8. Desats quickly when bipap removed for PO meds this morning  Trend VBGs, most recent 7.28/66/29  Ensure pt is on COPD Pathway and follow Global Initiative for Chronic Obstructive Lung Disease (GOLD) guidelines  Bronchodilators:   Duonebs q4h   Breo qd  Steroids: solumedrol 40mg q8h  Antibiotics: Levaquin 750 mg daily x 5 days.  Would ideally give CAP coverage with Zithromax  + CTX but penicillin is listed as an allergy  Xanax prn, morphine prn for anxiety/dyspnea crisis on BiPAP  Supplemental oxygen with SpO2 goal > 88%  Wean BiPAP to LFNC  Smoking cessation education        Cardiac/Vascular  Hypertension  Listed on problem list, but no antihypertensives listed on home meds list.  SBP > 200 on presentation, down to 140s after 2 mg IV Ativan given.    Losartan 25mg qd, nifedipine 30mg qd  PRN labetalol IV for SBP > 170  Will need close PCP follow-up after DC    Palliative Care  Palliative care encounter  Pt with chronic airway disease and frequent hospitalizations 2/t exacerbations. Pt now requiring increased supplemental O2 requirements while inpatient. If pt is able to be weaned off increased oxygen requirements, he remains a high risk for rehospitalization. Palliative Care consulted for GOC discussion with pt and family.    Other  History of benzodiazepine use  Anxiety    Prescribed Xanax 0.25 mg QHS PRN for anxiety since at least 2020.  Unable to access  at this time.  Given 1 mg Ativan IV x 2 in ED.    Xanax 1mg prn         Critical Care Daily Checklist:     A:  Awake: RASS Goal/Actual Goal: RASS Goal: 0-->alert and calm  Actual:     B: Spontaneous Breathing Trial Performed?    C: SAT & SBT Coordinated?  N/A                      D: Delirium: CAM-ICU Overall CAM-ICU: Negative   E: Early Mobility Performed? Yes   F: Feeding Goal:    Status:         Current Diet Order   Procedures    Diet Adult Regular (IDDSI Level 7)      AS: Analgesia/Sedation PRN meds   T: Thromboembolic Prophylaxis Heparin   H: HOB > 300 Yes   U: Stress Ulcer Prophylaxis (if needed) PPI   G: Glucose Control SSI   B: Bowel Function    I: Indwelling Catheter (Lines & Akhtar) Necessity PIV   D: De-escalation of Antimicrobials/Pharmacotherapies N/A     Plan for the day/ETD GOC     Code Status:  Family/Goals of Care: DNR today per palliative  Ongoing           Critical secondary to Patient has a condition that poses threat to life and bodily function: COPD exacerbation       Critical care was time spent personally by me on the following activities: development of treatment plan with patient or surrogate and bedside caregivers, discussions with consultants, evaluation of patient's response to treatment, examination of patient, ordering and performing treatments and interventions, ordering and review of laboratory studies, ordering and review of radiographic studies, pulse oximetry, re-evaluation of patient's condition. This critical care time did not overlap with that of any other provider or involve time for any procedures.     Evans King MD  Critical Care Medicine  [facility] - Cardiac Medical ICU

## 2023-12-11 NOTE — ASSESSMENT & PLAN NOTE
Listed on problem list, but no antihypertensives listed on home meds list.  SBP > 200 on presentation, down to 140s after 2 mg IV Ativan given.     - Losartan 25mg qd, nifedipine 30mg qd  - PRN labetalol IV for SBP > 170  - Will need close PCP follow-up after DC

## 2023-12-11 NOTE — ASSESSMENT & PLAN NOTE
Because the patient is experiencing an acute worsening in baseline symptoms (such as cough, dyspnea, and/or sputum production) beyond normal daily variations to an extent that requires a change in therapy, they are in an acute COPD exacerbation.  Patient is is on oxygen at 5 L/min per nasal cannula..  They are currently exhibiting the following signs of a severe exacerbation: accessory respiratory muscle use and paradoxical chest wall movement.  As such, their exacerbation is classified as severe.     - Admit to MICU due to his need for continuous BiPAP  - Duonebs q6h   - Breo qd  - Steroids tapering: solumedrol 40mg q12h  - Levaquin 750 mg daily x 5 days, eot 12/11  - Xanax prn, morphine prn for anxiety/dyspnea  - Supplemental oxygen with SpO2 goal > 88%  - Smoking cessation education  - PT/OT

## 2023-12-11 NOTE — SUBJECTIVE & OBJECTIVE
Interval History/Significant Events: No acute events overnight. Using BiPap nightly. Having multiple bowel movements.     Review of Systems   Constitutional:  Positive for activity change. Negative for diaphoresis and fever.   HENT:  Positive for congestion.    Respiratory:  Positive for cough, shortness of breath and wheezing.    Cardiovascular:  Negative for chest pain and leg swelling.   Gastrointestinal:  Negative for abdominal pain, diarrhea, nausea and vomiting.   Genitourinary:  Negative for difficulty urinating.   Skin:  Negative for rash.   Neurological:  Negative for syncope.   Psychiatric/Behavioral:  Negative for agitation.      Objective:     Vital Signs (Most Recent):  Temp: 97.9 °F (36.6 °C) (12/11/23 0800)  Pulse: 99 (12/11/23 1200)  Resp: (!) 23 (12/11/23 1200)  BP: (!) 176/83 (12/11/23 1200)  SpO2: 97 % (12/11/23 1200) Vital Signs (24h Range):  Temp:  [97.6 °F (36.4 °C)-98.2 °F (36.8 °C)] 97.9 °F (36.6 °C)  Pulse:  [] 99  Resp:  [14-28] 23  SpO2:  [82 %-98 %] 97 %  BP: (140-190)/(66-89) 176/83   Weight: 49.7 kg (109 lb 9.1 oz)  Body mass index is 20.04 kg/m².      Intake/Output Summary (Last 24 hours) at 12/11/2023 1245  Last data filed at 12/11/2023 1200  Gross per 24 hour   Intake 480 ml   Output 1300 ml   Net -820 ml            Physical Exam  Vitals and nursing note reviewed.   Constitutional:       General: He is not in acute distress.     Appearance: He is ill-appearing.      Comments: Tolerating LFNC daily and BiPap at night   HENT:      Head: Atraumatic.      Mouth/Throat:      Mouth: Mucous membranes are dry.   Eyes:      Conjunctiva/sclera: Conjunctivae normal.   Cardiovascular:      Rate and Rhythm: Normal rate and regular rhythm.   Pulmonary:      Effort: No respiratory distress.      Breath sounds: Wheezing and rales present.      Comments: Accessory muscle use. Diffuse end expiratory wheeze  Abdominal:      Palpations: Abdomen is soft.      Tenderness: There is no abdominal  tenderness.   Musculoskeletal:         General: No swelling or tenderness. Normal range of motion.   Skin:     General: Skin is warm and dry.   Neurological:      General: No focal deficit present.      Mental Status: He is alert and oriented to person, place, and time.   Psychiatric:         Mood and Affect: Mood normal.         Behavior: Behavior normal.            Vents:  Oxygen Concentration (%): 30 (12/11/23 0319)  Lines/Drains/Airways       Drain  Duration             Male External Urinary Catheter 12/05/23 2200 Small 5 days              Peripheral Intravenous Line  Duration                  Peripheral IV - Single Lumen 12/07/23 0900 22 G;1 in Anterior;Left;Proximal Forearm 4 days         Peripheral IV - Single Lumen 12/11/23 0916 20 G Anterior;Distal;Right Upper Arm <1 day                  Significant Labs:    CBC/Anemia Profile:  Recent Labs   Lab 12/10/23  0238 12/11/23  0344   WBC 19.15* 22.18*   HGB 14.7 15.3   HCT 43.6 46.0    167   MCV 89 92   RDW 14.9* 15.0*          Chemistries:  Recent Labs   Lab 12/10/23  0238 12/11/23  0344    138   K 4.9 4.5    105   CO2 24 23   BUN 55* 54*   CREATININE 1.2 1.0   CALCIUM 9.0 9.3   ALBUMIN 3.5 3.6   PROT 6.7 6.6   BILITOT 0.6 0.5   ALKPHOS 76 84   ALT 13 16   AST 18 18   MG 2.6 2.7*   PHOS 3.0 3.0         All pertinent labs within the past 24 hours have been reviewed.    Significant Imaging:  I have reviewed all pertinent imaging results/findings within the past 24 hours.

## 2023-12-11 NOTE — ASSESSMENT & PLAN NOTE
78 year old male with advanced COPD on 4-5L supplemental O2 at home who presents to ICU with acute on chronic hypoxemic respiratory failure due to COPD exacerbation. Palliative consulted for C     COPD exacerbation/Acute hypoxemic respiratory failure/acute hypercapneic respiratory failure/HTN  -management per primary and other consultants     Code status: DNR following conversation with patient 12/11    Surrogate decision maker: 2 children     GOC/ACP  -Met with patient and daughter at bedside. Introduced palliative medicine. Discussed diagnosis and prognosis and planning for the future. Patient is hopeful he can get back to his baseline- ambulatory but having to stop multiple times for breaks. He was still driving prior to admit. He understands he may not get back to baseline. Had in depth discussion regarding code status. Ultimately patient decided life support like the ventilator was no in line with his goals. He desires a peaceful and natural death. Discussed the use of comfort meds in the event of a respiratory arrest. Code status changed to DNR     24 minutes spent discussed ACP    -Code status changed to DNR   -Patient hopeful he will get back to baseline. Time will tell       Dyspnea   -trial oxycodone liquid 2mg q4h prn. Provided education     Opioid induced constipation   -Scheduled miralax     Anxiety  -prn xanax started per primary. Reduce dose from 1mg to 0.5mg. Would try to wean off prior to discharge     Discussed with Dr. Boswell

## 2023-12-11 NOTE — PLAN OF CARE
Hospital Medicine ICU Acceptance Note    Date of Admit: 12/5/2023  Date of Transfer / Stepdown: 12/11/2023  Bobarbaras, C/J, H, L, Onc (IV chemo w/in 1 month), Gyn/Onc, or other special case?: No   ICU team stepping patient down: MICU    Accepting  team: JOSE    Brief History of Present Illness:      Francois Otero Sr. is a 78M with PMHx of CAD, hyperlipidemia, hypertension and COPD on 5 L of O2 at home presents to the ED c/o worsening shortness of breath for the last several days worse with exertion. Patient reports chronic dyspnea mostly with exertion, uses oxygen consistently with exertion and at night but does not always use it at rest. Baseline SpO2 88-92%. Reports that for the last several days he has noticed decreased exercise tolerance and worsening shortness of breath. Has prednisone at home for emergencies and reports using prednisone 40mg daily for the last 3 days without improvement in his symptoms. Has had prior COPD exacerbations in the past, and this is similar. Denies having any recent sick contacts. Denies having any other symptoms, including worse cough or fevers. Reports consistent use of his inhalers, but more as rescue inhaler and not daily maintenance inhalers. Pt is prior smoker.      ED course: Afebrile, HDS, tachycardic, requiring BiPAP 10/5 30% and satting >90%. CBC significant for WBC 23k, CMP with mild acidosis, nl renal function, covid/flu/rsv negative, BNP/trop negative, VBG 7.43/37/49/25. CXR with hyperinflated lung, emphysematous changes, no consolidation. Admit to MICU for COPD exacerbation requiring BiPAP.    Hospital/ICU Course:     Admitted to MICU for COPD exacerbation requiring BiPAP. Started on solumedrol, duonebs, and levaquin. Working to wean BiPAP and transition to Comfort Flow. Palliative Care to be consulted for additional assistance with GOC discussions.       Consultants and Procedures:     Consultants:  Palliative    Procedures:    None    Transfer Information:      Diet:  Regular    Physical Activity:  PT/OT consulted    To Do / Pending Studies / Follow ups:  - Steroid taper  - Antibiotics   - Hypertension management    Patient has been accepted by Hospital Medicine Team B, who will assume care of the patient upon arrival to the floor from the ICU. Please contact ICU team with any concerns prior to arrival. Please contact [facility] at [phone] or [phone] (please do NOT leave a voicemail) when patient arrives to the floor.    Raymond Hurt MD  [facility] Medicine Staff

## 2023-12-11 NOTE — PLAN OF CARE
[facility] Card[facility]  Discharge Reassessment    Primary Care Provider: Carley Kothari MD    Expected Discharge Date: 12/13/2023    Reassessment (most recent)       Discharge Reassessment - 12/11/23 1652          Discharge Reassessment    Assessment Type Discharge Planning Reassessment     Did the patient's condition or plan change since previous assessment? No     Discharge Plan discussed with: Adult children     Communicated RONEL with patient/caregiver Date not available/Unable to determine     Discharge Plan A Skilled Nursing Facility     Discharge Plan B Home Health     DME Needed Upon Discharge  other (see comments)   TBD    Transition of Care Barriers None     Why the patient remains in the hospital Requires continued medical care        Post-Acute Status    Post-Acute Authorization Placement     Post-Acute Placement Status Pending medical clearance/testing     Coverage [employer] Medicare     Discharge Delays None known at this time                   Discharge Plan A and Plan B have been determined by review of patient's clinical status, future medical and therapeutic needs, and coverage/benefits for post-acute care in coordination with multidisciplinary team members.    Alden Uriarte LMSW  [facility]  [phone]

## 2023-12-11 NOTE — ASSESSMENT & PLAN NOTE
Listed on problem list, but no antihypertensives listed on home meds list.  SBP > 200 on presentation, down to 140s after 2 mg IV Ativan given.    Losartan 25mg qd, nifedipine 30mg qd  PRN labetalol IV for SBP > 170  Will need close PCP follow-up after DC

## 2023-12-11 NOTE — HPI
"Per critical care H&P  "78M with PMHx of CAD, hyperlipidemia, hypertension and COPD on 5 L of O2 at home presents to the ED c/o worsening shortness of breath for the last several days worse with exertion. Patient reports chronic dyspnea mostly with exertion, uses oxygen consistently with exertion and at night but does not always use it at rest. Baseline SpO2 88-92%. Reports that for the last several days he has noticed decreased exercise tolerance and worsening shortness of breath. Has prednisone at home for emergencies and reports using prednisone 40mg daily for the last 3 days without improvement in his symptoms. Has had prior COPD exacerbations in the past, and this is similar. Denies having any recent sick contacts. Denies having any other symptoms, including worse cough or fevers. Reports consistent use of his inhalers, but more as rescue inhaler and not daily maintenance inhalers. Pt is prior smoker.      ED course: Afebrile, HDS, tachycardic, requiring BiPAP 10/5 30% and satting >90%. CBC significant for WBC 23k, CMP with mild acidosis, nl renal function, covid/flu/rsv negative, BNP/trop negative, VBG 7.43/37/49/25. CXR with hyperinflated lung, emphysematous changes, no consolidation. Admit to MICU for COPD exacerbation requiring BiPAP.     Hospital/ICU Course:  78M with PMHx of CAD, hyperlipidemia, hypertension and COPD on 5 L of O2 at home presents to the ED c/o worsening shortness of breath for the last several days worse with exertion.  Admitted to MICU for COPD exacerbation requiring BiPAP. Started on solumedrol, duonebs, and levaquin. Working to wean BiPAP and transition to Comfort Flow. Palliative Care to be consulted for additional assistance with GOC discussions."     Palliative consulted to discuss GOC and symptom management. Patient is now on 3L NC         "

## 2023-12-11 NOTE — CONSULTS
[facility] - Cardiac Medical ICU  Palliative Medicine  Consult Note    Patient Name: Francois Otero Sr.  MRN: [id number]  Admission Date: 12/5/2023  Hospital Length of Stay: 6 days  Code Status: DNR   Attending Provider: Roe Boswell*  Consulting Provider: Rupinder Kenyon MD  Primary Care Physician: Carley Kothari MD  Principal Problem:COPD exacerbation    Patient information was obtained from patient, relative(s), and primary team.      Consults  Assessment/Plan:     Palliative Care  Palliative care encounter  78 year old male with advanced COPD on 4-5L supplemental O2 at home who presents to ICU with acute on chronic hypoxemic respiratory failure due to COPD exacerbation. Palliative consulted for GOC     COPD exacerbation/Acute hypoxemic respiratory failure/acute hypercapneic respiratory failure/HTN  -management per primary and other consultants     Code status: DNR following conversation with patient 12/11    Surrogate decision maker: 2 children     GOC/ACP  -Met with patient and daughter at bedside. Introduced palliative medicine. Discussed diagnosis and prognosis and planning for the future. Patient is hopeful he can get back to his baseline- ambulatory but having to stop multiple times for breaks. He was still driving prior to admit. He understands he may not get back to baseline. Had in depth discussion regarding code status. Ultimately patient decided life support like the ventilator was no in line with his goals. He desires a peaceful and natural death. Discussed the use of comfort meds in the event of a respiratory arrest. Code status changed to DNR     24 minutes spent discussed ACP    -Code status changed to DNR   -Patient hopeful he will get back to baseline. Time will tell       Dyspnea   -trial oxycodone liquid 2mg q4h prn. Provided education     Opioid induced constipation   -Scheduled miralax     Anxiety  -prn xanax started per primary. Reduce dose from 1mg to 0.5mg. Would try to wean  "off prior to discharge     Discussed with Dr. Boswell          Thank you for your consult. I will follow-up with patient. Please contact us if you have any additional questions.    Subjective:     HPI:   Per critical care H&P  "78M with PMHx of CAD, hyperlipidemia, hypertension and COPD on 5 L of O2 at home presents to the ED c/o worsening shortness of breath for the last several days worse with exertion. Patient reports chronic dyspnea mostly with exertion, uses oxygen consistently with exertion and at night but does not always use it at rest. Baseline SpO2 88-92%. Reports that for the last several days he has noticed decreased exercise tolerance and worsening shortness of breath. Has prednisone at home for emergencies and reports using prednisone 40mg daily for the last 3 days without improvement in his symptoms. Has had prior COPD exacerbations in the past, and this is similar. Denies having any recent sick contacts. Denies having any other symptoms, including worse cough or fevers. Reports consistent use of his inhalers, but more as rescue inhaler and not daily maintenance inhalers. Pt is prior smoker.      ED course: Afebrile, HDS, tachycardic, requiring BiPAP 10/5 30% and satting >90%. CBC significant for WBC 23k, CMP with mild acidosis, nl renal function, covid/flu/rsv negative, BNP/trop negative, VBG 7.43/37/49/25. CXR with hyperinflated lung, emphysematous changes, no consolidation. Admit to MICU for COPD exacerbation requiring BiPAP.     Hospital/ICU Course:  78M with PMHx of CAD, hyperlipidemia, hypertension and COPD on 5 L of O2 at home presents to the ED c/o worsening shortness of breath for the last several days worse with exertion.  Admitted to MICU for COPD exacerbation requiring BiPAP. Started on solumedrol, duonebs, and levaquin. Working to wean BiPAP and transition to Comfort Flow. Palliative Care to be consulted for additional assistance with GOC discussions."     Palliative consulted to " discuss GOC and symptom management. Patient is now on 3L NC           Hospital Course:  No notes on file        Past Medical History:   Diagnosis Date    Colon polyp     COPD (chronic obstructive pulmonary disease)     Coronary artery disease     Hyperlipidemia     Hypertension     On home oxygen therapy     Pneumonia due to COVID-19 virus 12/03/2020 12/3/2020    Tobacco abuse     1 PPD X 59 Yrs    Vertigo        Past Surgical History:   Procedure Laterality Date    by pass on leg       CARDIAC CATHETERIZATION      cardiac stents       COLONOSCOPY N/A 9/14/2017    Procedure: COLONOSCOPY;  Surgeon: Robb Yepez MD;  Location: [facility];  Service: Endoscopy;  Laterality: N/A; repeat in 5 years    UPPER GASTROINTESTINAL ENDOSCOPY  09/14/2017    Dr. Yepez       Review of patient's allergies indicates:   Allergen Reactions    Lisinopril Edema    Penicillin g sodium      Other reaction(s): unknown. was told by his mother.    Penicillins     Sulfa (sulfonamide antibiotics)        Medications:  Continuous Infusions:  Scheduled Meds:   fluticasone furoate-vilanteroL  1 puff Inhalation Daily    heparin (porcine)  5,000 Units Subcutaneous Q8H    ipratropium  0.5 mg Nebulization Q4H    levalbuterol  1.25 mg Nebulization Q4H    methylPREDNISolone sodium succinate injection  40 mg Intravenous Q8H    NIFEdipine  60 mg Oral Daily    pantoprazole  40 mg Oral Daily    polyethylene glycol  17 g Oral Daily     PRN Meds:ALPRAZolam, benzonatate, calcium gluconate IVPB, calcium gluconate IVPB, calcium gluconate IVPB, labetalol, magnesium sulfate IVPB, magnesium sulfate IVPB, melatonin, ondansetron, oxyCODONE, potassium chloride **AND** potassium chloride **AND** potassium chloride, sodium chloride 0.9%, sodium phosphate 15 mmol in dextrose 5 % (D5W) 250 mL IVPB, sodium phosphate 20.01 mmol in dextrose 5 % (D5W) 250 mL IVPB, sodium phosphate 30 mmol in dextrose 5 % (D5W) 250 mL IVPB    Family History       Problem Relation (Age  of Onset)    Cancer Mother    Cataracts Mother, Maternal Aunt    Colon cancer Mother    Emphysema Mother    Heart disease Father          Tobacco Use    Smoking status: Former     Current packs/day: 0.00     Types: Cigarettes     Quit date: 9/3/2015     Years since quittin.2    Smokeless tobacco: Never   Substance and Sexual Activity    Alcohol use: Yes     Alcohol/week: 0.0 standard drinks of alcohol     Comment: rarely     Drug use: No    Sexual activity: Not on file       Review of Systems   Constitutional:  Positive for activity change and fatigue.   HENT: Negative.     Eyes: Negative.    Respiratory:  Positive for shortness of breath.    Cardiovascular: Negative.    Gastrointestinal: Negative.    Endocrine: Negative.    Musculoskeletal: Negative.    Neurological: Negative.    Psychiatric/Behavioral: Negative.       Objective:     Vital Signs (Most Recent):  Temp: 97.6 °F (36.4 °C) (23 1200)  Pulse: 94 (23 1400)  Resp: (!) 27 (23 1400)  BP: (!) 188/86 (23 1400)  SpO2: 98 % (23 1400) Vital Signs (24h Range):  Temp:  [97.6 °F (36.4 °C)-98.1 °F (36.7 °C)] 97.6 °F (36.4 °C)  Pulse:  [] 94  Resp:  [14-28] 27  SpO2:  [82 %-98 %] 98 %  BP: (140-190)/(66-89) 188/86     Weight: 49.7 kg (109 lb 9.1 oz)  Body mass index is 20.04 kg/m².       Physical Exam  Vitals and nursing note reviewed.   Constitutional:       General: He is not in acute distress.     Appearance: He is ill-appearing.   HENT:      Head: Normocephalic.      Right Ear: External ear normal.      Left Ear: External ear normal.      Nose:      Comments: NC in place     Mouth/Throat:      Mouth: Mucous membranes are dry.   Eyes:      Pupils: Pupils are equal, round, and reactive to light.   Cardiovascular:      Rate and Rhythm: Normal rate.   Pulmonary:      Effort: Pulmonary effort is normal. No respiratory distress.   Abdominal:      General: There is no distension.   Musculoskeletal:         General: Normal range of  motion.      Cervical back: Normal range of motion.   Neurological:      Mental Status: He is alert and oriented to person, place, and time.   Psychiatric:         Mood and Affect: Mood normal.         Thought Content: Thought content normal.            Review of Symptoms      Symptom Assessment (ESAS 0-10 Scale)  Pain:  0  Dyspnea:  4  Anxiety:  4  Nausea:  0  Depression:  0  Anorexia:  0  Fatigue:  0  Insomnia:  0  Restlessness:  0  Agitation:  0     CAM / Delirium:  Negative  Constipation:  Negative  Diarrhea:  Negative      Bowel Management Plan (BMP):  Yes      Modified Babs Scale:  1    Performance Status:  60    Living Arrangements:  Lives alone    Psychosocial/Cultural:   See Palliative Psychosocial Note: No  Two children. Enjoys shooting and selling fireworks   **Primary  to Follow**  Palliative Care  Consult: No    Spiritual:  F - Venita and Belief:  Not addressed         Advance Care Planning  Advance Directives:   Living Will: No    LaPOST: No    Do Not Resuscitate Status: Yes    Medical Power of : No      Decision Making:  Patient answered questions  Goals of Care: The patient endorses that what is most important right now is to focus on improvement in condition but with limits to invasive therapies    Accordingly, we have decided that the best plan to meet the patient's goals includes continuing with treatment         Significant Labs: All pertinent labs within the past 24 hours have been reviewed.  CBC:   Recent Labs   Lab 12/11/23  0344   WBC 22.18*   HGB 15.3   HCT 46.0   MCV 92        BMP:  Recent Labs   Lab 12/11/23  0344   *      K 4.5      CO2 23   BUN 54*   CREATININE 1.0   CALCIUM 9.3   MG 2.7*     LFT:  Lab Results   Component Value Date    AST 18 12/11/2023    ALKPHOS 84 12/11/2023    BILITOT 0.5 12/11/2023     Albumin:   Albumin   Date Value Ref Range Status   12/11/2023 3.6 3.5 - 5.2 g/dL Final     Protein:   Total Protein   Date  Value Ref Range Status   12/11/2023 6.6 6.0 - 8.4 g/dL Final     Lactic acid:   Lab Results   Component Value Date    LACTATE 1.7 12/03/2020       Significant Imaging: I have reviewed all pertinent imaging results/findings within the past 24 hours.    Chest x-ray 12/9/23  There is coarse interstitial attenuation bilaterally, similar in extent and distribution as compared to the previous exam.  No interval large focal consolidation, pneumothorax, or other significant detrimental change.           Rupinder Kenyon MD  Palliative Medicine  [facility] - Cardiac Medical ICU

## 2023-12-11 NOTE — ASSESSMENT & PLAN NOTE
Prescribed Xanax 0.25 mg QHS PRN for anxiety since at least 2020.  Unable to access  at this time.  Given 1 mg Ativan IV x 2 in ED.     - Xanax 1mg prn

## 2023-12-11 NOTE — SUBJECTIVE & OBJECTIVE
Past Medical History:   Diagnosis Date    Colon polyp     COPD (chronic obstructive pulmonary disease)     Coronary artery disease     Hyperlipidemia     Hypertension     On home oxygen therapy     Pneumonia due to COVID-19 virus 12/03/2020 12/3/2020    Tobacco abuse     1 PPD X 59 Yrs    Vertigo        Past Surgical History:   Procedure Laterality Date    by pass on leg       CARDIAC CATHETERIZATION      cardiac stents       COLONOSCOPY N/A 9/14/2017    Procedure: COLONOSCOPY;  Surgeon: Robb Yepez MD;  Location: [facility];  Service: Endoscopy;  Laterality: N/A; repeat in 5 years    UPPER GASTROINTESTINAL ENDOSCOPY  09/14/2017    Dr. Yepez       Review of patient's allergies indicates:   Allergen Reactions    Lisinopril Edema    Penicillin g sodium      Other reaction(s): unknown. was told by his mother.    Penicillins     Sulfa (sulfonamide antibiotics)        Medications:  Continuous Infusions:  Scheduled Meds:   fluticasone furoate-vilanteroL  1 puff Inhalation Daily    heparin (porcine)  5,000 Units Subcutaneous Q8H    ipratropium  0.5 mg Nebulization Q4H    levalbuterol  1.25 mg Nebulization Q4H    methylPREDNISolone sodium succinate injection  40 mg Intravenous Q8H    NIFEdipine  60 mg Oral Daily    pantoprazole  40 mg Oral Daily    polyethylene glycol  17 g Oral Daily     PRN Meds:ALPRAZolam, benzonatate, calcium gluconate IVPB, calcium gluconate IVPB, calcium gluconate IVPB, labetalol, magnesium sulfate IVPB, magnesium sulfate IVPB, melatonin, ondansetron, oxyCODONE, potassium chloride **AND** potassium chloride **AND** potassium chloride, sodium chloride 0.9%, sodium phosphate 15 mmol in dextrose 5 % (D5W) 250 mL IVPB, sodium phosphate 20.01 mmol in dextrose 5 % (D5W) 250 mL IVPB, sodium phosphate 30 mmol in dextrose 5 % (D5W) 250 mL IVPB    Family History       Problem Relation (Age of Onset)    Cancer Mother    Cataracts Mother, Maternal Aunt    Colon cancer Mother    Emphysema Mother     Heart disease Father          Tobacco Use    Smoking status: Former     Current packs/day: 0.00     Types: Cigarettes     Quit date: 9/3/2015     Years since quittin.2    Smokeless tobacco: Never   Substance and Sexual Activity    Alcohol use: Yes     Alcohol/week: 0.0 standard drinks of alcohol     Comment: rarely     Drug use: No    Sexual activity: Not on file       Review of Systems   Constitutional:  Positive for activity change and fatigue.   HENT: Negative.     Eyes: Negative.    Respiratory:  Positive for shortness of breath.    Cardiovascular: Negative.    Gastrointestinal: Negative.    Endocrine: Negative.    Musculoskeletal: Negative.    Neurological: Negative.    Psychiatric/Behavioral: Negative.       Objective:     Vital Signs (Most Recent):  Temp: 97.6 °F (36.4 °C) (23 1200)  Pulse: 94 (23 1400)  Resp: (!) 27 (23 1400)  BP: (!) 188/86 (23 1400)  SpO2: 98 % (23 1400) Vital Signs (24h Range):  Temp:  [97.6 °F (36.4 °C)-98.1 °F (36.7 °C)] 97.6 °F (36.4 °C)  Pulse:  [] 94  Resp:  [14-28] 27  SpO2:  [82 %-98 %] 98 %  BP: (140-190)/(66-89) 188/86     Weight: 49.7 kg (109 lb 9.1 oz)  Body mass index is 20.04 kg/m².       Physical Exam  Vitals and nursing note reviewed.   Constitutional:       General: He is not in acute distress.     Appearance: He is ill-appearing.   HENT:      Head: Normocephalic.      Right Ear: External ear normal.      Left Ear: External ear normal.      Nose:      Comments: NC in place     Mouth/Throat:      Mouth: Mucous membranes are dry.   Eyes:      Pupils: Pupils are equal, round, and reactive to light.   Cardiovascular:      Rate and Rhythm: Normal rate.   Pulmonary:      Effort: Pulmonary effort is normal. No respiratory distress.   Abdominal:      General: There is no distension.   Musculoskeletal:         General: Normal range of motion.      Cervical back: Normal range of motion.   Neurological:      Mental Status: He is alert and  oriented to person, place, and time.   Psychiatric:         Mood and Affect: Mood normal.         Thought Content: Thought content normal.            Review of Symptoms      Symptom Assessment (ESAS 0-10 Scale)  Pain:  0  Dyspnea:  4  Anxiety:  4  Nausea:  0  Depression:  0  Anorexia:  0  Fatigue:  0  Insomnia:  0  Restlessness:  0  Agitation:  0     CAM / Delirium:  Negative  Constipation:  Negative  Diarrhea:  Negative      Bowel Management Plan (BMP):  Yes      Modified Babs Scale:  1    Performance Status:  60    Living Arrangements:  Lives alone    Psychosocial/Cultural:   See Palliative Psychosocial Note: No  Two children. Enjoys shooting and selling [employer]   **Primary  to Follow**  Palliative Care  Consult: No    Spiritual:  F - Venita and Belief:  Not addressed         Advance Care Planning   Advance Directives:   Living Will: No    LaPOST: No    Do Not Resuscitate Status: Yes    Medical Power of : No      Decision Making:  Patient answered questions  Goals of Care: The patient endorses that what is most important right now is to focus on improvement in condition but with limits to invasive therapies    Accordingly, we have decided that the best plan to meet the patient's goals includes continuing with treatment         Significant Labs: All pertinent labs within the past 24 hours have been reviewed.  CBC:   Recent Labs   Lab 12/11/23  0344   WBC 22.18*   HGB 15.3   HCT 46.0   MCV 92        BMP:  Recent Labs   Lab 12/11/23  0344   *      K 4.5      CO2 23   BUN 54*   CREATININE 1.0   CALCIUM 9.3   MG 2.7*     LFT:  Lab Results   Component Value Date    AST 18 12/11/2023    ALKPHOS 84 12/11/2023    BILITOT 0.5 12/11/2023     Albumin:   Albumin   Date Value Ref Range Status   12/11/2023 3.6 3.5 - 5.2 g/dL Final     Protein:   Total Protein   Date Value Ref Range Status   12/11/2023 6.6 6.0 - 8.4 g/dL Final     Lactic acid:   Lab Results   Component  Value Date    LACTATE 1.7 12/03/2020       Significant Imaging: I have reviewed all pertinent imaging results/findings within the past 24 hours.    Chest x-ray 12/9/23  There is coarse interstitial attenuation bilaterally, similar in extent and distribution as compared to the previous exam.  No interval large focal consolidation, pneumothorax, or other significant detrimental change.

## 2023-12-11 NOTE — ASSESSMENT & PLAN NOTE
Pt with chronic airway disease and frequent hospitalizations 2/t exacerbations. Pt now requiring increased supplemental O2 requirements while inpatient. If pt is able to be weaned off increased oxygen requirements, he remains a high risk for rehospitalization.   - Palliative Care consulted for GOC discussion with pt and family.  - DNR

## 2023-12-12 LAB
ALBUMIN SERPL BCP-MCNC: 3.6 G/DL (ref 3.5–5.2)
ALP SERPL-CCNC: 81 U/L (ref 55–135)
ALT SERPL W/O P-5'-P-CCNC: 28 U/L (ref 10–44)
ANION GAP SERPL CALC-SCNC: 13 MMOL/L (ref 8–16)
AST SERPL-CCNC: 31 U/L (ref 10–40)
BASOPHILS # BLD AUTO: 0.12 K/UL (ref 0–0.2)
BASOPHILS NFR BLD: 0.5 % (ref 0–1.9)
BILIRUB SERPL-MCNC: 0.6 MG/DL (ref 0.1–1)
BUN SERPL-MCNC: 55 MG/DL (ref 8–23)
CALCIUM SERPL-MCNC: 9.8 MG/DL (ref 8.7–10.5)
CHLORIDE SERPL-SCNC: 103 MMOL/L (ref 95–110)
CO2 SERPL-SCNC: 22 MMOL/L (ref 23–29)
CREAT SERPL-MCNC: 1.2 MG/DL (ref 0.5–1.4)
DIFFERENTIAL METHOD: ABNORMAL
EOSINOPHIL # BLD AUTO: 0 K/UL (ref 0–0.5)
EOSINOPHIL NFR BLD: 0 % (ref 0–8)
ERYTHROCYTE [DISTWIDTH] IN BLOOD BY AUTOMATED COUNT: 14.8 % (ref 11.5–14.5)
EST. GFR  (NO RACE VARIABLE): >60 ML/MIN/1.73 M^2
GLUCOSE SERPL-MCNC: 161 MG/DL (ref 70–110)
HCT VFR BLD AUTO: 49 % (ref 40–54)
HGB BLD-MCNC: 16.2 G/DL (ref 14–18)
IMM GRANULOCYTES # BLD AUTO: 0.34 K/UL (ref 0–0.04)
IMM GRANULOCYTES NFR BLD AUTO: 1.3 % (ref 0–0.5)
LYMPHOCYTES # BLD AUTO: 10.2 K/UL (ref 1–4.8)
LYMPHOCYTES NFR BLD: 38.7 % (ref 18–48)
MAGNESIUM SERPL-MCNC: 2.5 MG/DL (ref 1.6–2.6)
MCH RBC QN AUTO: 31 PG (ref 27–31)
MCHC RBC AUTO-ENTMCNC: 33.1 G/DL (ref 32–36)
MCV RBC AUTO: 94 FL (ref 82–98)
MONOCYTES # BLD AUTO: 0.9 K/UL (ref 0.3–1)
MONOCYTES NFR BLD: 3.3 % (ref 4–15)
NEUTROPHILS # BLD AUTO: 14.9 K/UL (ref 1.8–7.7)
NEUTROPHILS NFR BLD: 56.2 % (ref 38–73)
NRBC BLD-RTO: 0 /100 WBC
PHOSPHATE SERPL-MCNC: 2.6 MG/DL (ref 2.7–4.5)
PLATELET # BLD AUTO: 190 K/UL (ref 150–450)
PMV BLD AUTO: 11 FL (ref 9.2–12.9)
POTASSIUM SERPL-SCNC: 4.8 MMOL/L (ref 3.5–5.1)
PROT SERPL-MCNC: 6.6 G/DL (ref 6–8.4)
RBC # BLD AUTO: 5.23 M/UL (ref 4.6–6.2)
SODIUM SERPL-SCNC: 138 MMOL/L (ref 136–145)
WBC # BLD AUTO: 26.45 K/UL (ref 3.9–12.7)

## 2023-12-12 PROCEDURE — 85027 COMPLETE CBC AUTOMATED: CPT

## 2023-12-12 PROCEDURE — 25000003 PHARM REV CODE 250

## 2023-12-12 PROCEDURE — 63600175 PHARM REV CODE 636 W HCPCS: Performed by: STUDENT IN AN ORGANIZED HEALTH CARE EDUCATION/TRAINING PROGRAM

## 2023-12-12 PROCEDURE — 97530 THERAPEUTIC ACTIVITIES: CPT | Mod: CO

## 2023-12-12 PROCEDURE — 80053 COMPREHEN METABOLIC PANEL: CPT

## 2023-12-12 PROCEDURE — 27000221 HC OXYGEN, UP TO 24 HOURS

## 2023-12-12 PROCEDURE — 25000242 PHARM REV CODE 250 ALT 637 W/ HCPCS

## 2023-12-12 PROCEDURE — 94640 AIRWAY INHALATION TREATMENT: CPT

## 2023-12-12 PROCEDURE — 99900035 HC TECH TIME PER 15 MIN (STAT)

## 2023-12-12 PROCEDURE — 20600001 HC STEP DOWN PRIVATE ROOM

## 2023-12-12 PROCEDURE — 97530 THERAPEUTIC ACTIVITIES: CPT | Mod: CQ

## 2023-12-12 PROCEDURE — 84100 ASSAY OF PHOSPHORUS: CPT

## 2023-12-12 PROCEDURE — 25000003 PHARM REV CODE 250: Performed by: STUDENT IN AN ORGANIZED HEALTH CARE EDUCATION/TRAINING PROGRAM

## 2023-12-12 PROCEDURE — 25000003 PHARM REV CODE 250: Performed by: INTERNAL MEDICINE

## 2023-12-12 PROCEDURE — 83735 ASSAY OF MAGNESIUM: CPT

## 2023-12-12 PROCEDURE — 85007 BL SMEAR W/DIFF WBC COUNT: CPT

## 2023-12-12 PROCEDURE — 25000242 PHARM REV CODE 250 ALT 637 W/ HCPCS: Performed by: INTERNAL MEDICINE

## 2023-12-12 PROCEDURE — 97116 GAIT TRAINING THERAPY: CPT | Mod: CQ

## 2023-12-12 PROCEDURE — 63600175 PHARM REV CODE 636 W HCPCS

## 2023-12-12 PROCEDURE — 94799 UNLISTED PULMONARY SVC/PX: CPT | Mod: XB

## 2023-12-12 PROCEDURE — 94660 CPAP INITIATION&MGMT: CPT

## 2023-12-12 PROCEDURE — 97110 THERAPEUTIC EXERCISES: CPT | Mod: CO

## 2023-12-12 PROCEDURE — 63600175 PHARM REV CODE 636 W HCPCS: Performed by: HOSPITALIST

## 2023-12-12 PROCEDURE — 36415 COLL VENOUS BLD VENIPUNCTURE: CPT

## 2023-12-12 PROCEDURE — 94761 N-INVAS EAR/PLS OXIMETRY MLT: CPT

## 2023-12-12 RX ORDER — IPRATROPIUM BROMIDE 0.5 MG/2.5ML
0.5 SOLUTION RESPIRATORY (INHALATION) EVERY 6 HOURS
Status: DISCONTINUED | OUTPATIENT
Start: 2023-12-12 | End: 2023-12-13

## 2023-12-12 RX ORDER — LEVALBUTEROL 1.25 MG/.5ML
1.25 SOLUTION, CONCENTRATE RESPIRATORY (INHALATION) EVERY 6 HOURS
Status: DISCONTINUED | OUTPATIENT
Start: 2023-12-13 | End: 2023-12-13

## 2023-12-12 RX ORDER — ALBUTEROL SULFATE 2.5 MG/.5ML
2.5 SOLUTION RESPIRATORY (INHALATION) EVERY 4 HOURS PRN
Status: DISCONTINUED | OUTPATIENT
Start: 2023-12-12 | End: 2023-12-20 | Stop reason: HOSPADM

## 2023-12-12 RX ORDER — LEVALBUTEROL 1.25 MG/.5ML
1.25 SOLUTION, CONCENTRATE RESPIRATORY (INHALATION) EVERY 6 HOURS
Status: DISCONTINUED | OUTPATIENT
Start: 2023-12-13 | End: 2023-12-12

## 2023-12-12 RX ORDER — ACETAMINOPHEN 325 MG/1
650 TABLET ORAL EVERY 6 HOURS PRN
Status: DISCONTINUED | OUTPATIENT
Start: 2023-12-12 | End: 2023-12-20 | Stop reason: HOSPADM

## 2023-12-12 RX ORDER — SIMETHICONE 80 MG
1 TABLET,CHEWABLE ORAL 3 TIMES DAILY PRN
Status: DISCONTINUED | OUTPATIENT
Start: 2023-12-12 | End: 2023-12-20 | Stop reason: HOSPADM

## 2023-12-12 RX ORDER — LORAZEPAM 2 MG/ML
1 INJECTION INTRAMUSCULAR ONCE
Status: COMPLETED | OUTPATIENT
Start: 2023-12-12 | End: 2023-12-12

## 2023-12-12 RX ORDER — BISACODYL 10 MG
10 SUPPOSITORY, RECTAL RECTAL DAILY PRN
Status: DISCONTINUED | OUTPATIENT
Start: 2023-12-12 | End: 2023-12-20 | Stop reason: HOSPADM

## 2023-12-12 RX ADMIN — NIFEDIPINE 60 MG: 30 TABLET, FILM COATED, EXTENDED RELEASE ORAL at 08:12

## 2023-12-12 RX ADMIN — IPRATROPIUM BROMIDE 0.5 MG: 0.5 SOLUTION RESPIRATORY (INHALATION) at 08:12

## 2023-12-12 RX ADMIN — LORAZEPAM 1 MG: 2 INJECTION INTRAMUSCULAR; INTRAVENOUS at 11:12

## 2023-12-12 RX ADMIN — HEPARIN SODIUM 5000 UNITS: 5000 INJECTION INTRAVENOUS; SUBCUTANEOUS at 11:12

## 2023-12-12 RX ADMIN — METHYLPREDNISOLONE SODIUM SUCCINATE 40 MG: 40 INJECTION, POWDER, FOR SOLUTION INTRAMUSCULAR; INTRAVENOUS at 06:12

## 2023-12-12 RX ADMIN — LEVALBUTEROL 1.25 MG: 1.25 SOLUTION, CONCENTRATE RESPIRATORY (INHALATION) at 08:12

## 2023-12-12 RX ADMIN — FLUTICASONE FUROATE AND VILANTEROL TRIFENATATE 1 PUFF: 200; 25 POWDER RESPIRATORY (INHALATION) at 08:12

## 2023-12-12 RX ADMIN — ALPRAZOLAM 0.5 MG: 0.5 TABLET ORAL at 04:12

## 2023-12-12 RX ADMIN — IPRATROPIUM BROMIDE 0.5 MG: 0.5 SOLUTION RESPIRATORY (INHALATION) at 04:12

## 2023-12-12 RX ADMIN — PANTOPRAZOLE SODIUM 40 MG: 40 TABLET, DELAYED RELEASE ORAL at 08:12

## 2023-12-12 RX ADMIN — IPRATROPIUM BROMIDE 0.5 MG: 0.5 SOLUTION RESPIRATORY (INHALATION) at 11:12

## 2023-12-12 RX ADMIN — SIMETHICONE 80 MG: 80 TABLET, CHEWABLE ORAL at 05:12

## 2023-12-12 RX ADMIN — HEPARIN SODIUM 5000 UNITS: 5000 INJECTION INTRAVENOUS; SUBCUTANEOUS at 03:12

## 2023-12-12 RX ADMIN — LEVALBUTEROL 1.25 MG: 1.25 SOLUTION, CONCENTRATE RESPIRATORY (INHALATION) at 11:12

## 2023-12-12 RX ADMIN — HEPARIN SODIUM 5000 UNITS: 5000 INJECTION INTRAVENOUS; SUBCUTANEOUS at 06:12

## 2023-12-12 RX ADMIN — LEVALBUTEROL 1.25 MG: 1.25 SOLUTION, CONCENTRATE RESPIRATORY (INHALATION) at 04:12

## 2023-12-12 NOTE — NURSING
Patient is  AAO*4  . Had Bowel and Bladder movement .  Patient  complains of  abdominal  distention but no pain , PRN meds  given , Provider made aware . Worked  with  OT Team, walked tot he bathroom with stand  by assistance . Call light within reach ,safety precaution maintained . Will continue monitoring .

## 2023-12-12 NOTE — NURSING
Nurses Note -- 4 Eyes      12/11/2023    4428      Skin assessed during: Transfer      [x] No Altered Skin Integrity Present    [x]Prevention Measures Documented      [] Yes- Altered Skin Integrity Present or Discovered   [] LDA Added if Not in Epic (Describe Wound)   [] New Altered Skin Integrity was Present on Admit and Documented in LDA   [] Wound Image Taken    Wound Care Consulted? No    Attending Nurse: SHANNON Terrazas     Second RN/Staff Member: SHANNON Lott

## 2023-12-12 NOTE — NURSING
Pt transferred via bed with staff assist to new room. 14WT charge nurse aware of pt arrivial. Daughter accompanied pt. No distress noted/reported. WCTM.

## 2023-12-12 NOTE — NURSING TRANSFER
Nursing Transfer Note      12/11/2023   9:00 PM    Nurse giving handoff:Louise ORTEGA     Nurse receiving handoff:Nini ORTEGA    Reason patient is being transferred: Level of care    Transfer  : To room 44276 from room 6076    Transfer via  Bed     Transfer with  O2    Transported by     Transfer Vital Signs:  Blood Pressure:159/77   Heart Rate:99  O2:96   Temperature:97.9   Respirations:22    Telemetry:    Order for Tele Monitor?  N     Additional Lines:  External cath    4eyes on Skin:  Y     Medicines sent: None    Any special needs or follow-up needed: NA    Patient belongings transferred with patient:  y     Chart send with patient:Yes    Notified: daughter    Patient reassessed at: 12/11 21:00     (date, time)  1  Upon arrival to floor: cardiac monitor applied, patient oriented to room, call bell in reach, and bed in lowest position

## 2023-12-12 NOTE — PT/OT/SLP PROGRESS
Occupational Therapy   Treatment    Name: Francois Otero Sr.  MRN: [id number]  Admitting Diagnosis:  COPD exacerbation       Recommendations:     Discharge Recommendations: Moderate Intensity Therapy  Discharge Equipment Recommendations:  none  Barriers to discharge:  Inaccessible home environment, Decreased caregiver support    Assessment:     Francois Otero Sr. is a 78 y.o. male with a medical diagnosis of COPD exacerbation.  He presents with the fallowing performance deficits affecting function are weakness, impaired endurance, impaired functional mobility, gait instability, decreased lower extremity function, impaired cardiopulmonary response to activity, impaired self care skills. Patient is making progress with functional transfers and self-care task. However; patient continues to exhibit poor activity tolerance secondary to SOB. Patient's O2 stats were 94% prior to ambulation. Patient's O2 stats desaturated to 87-89%. After taking a seated rest break and perform breathing techniques 02 levels increase to 92-93. Patient would benefit form OT intervention post acute environment to address functional decline with endurance, mobility, and ADLs.    Rehab Prognosis:  Good; patient would benefit from acute skilled OT services to address these deficits and reach maximum level of function.       Plan:     Patient to be seen 4 x/week to address the above listed problems via self-care/home management, therapeutic activities, therapeutic exercises  Plan of Care Expires: 01/08/24  Plan of Care Reviewed with: patient, daughter    Subjective     Chief Complaint: fatigue due to SOB  Patient/Family Comments/goals: to get better and return to PLOF  Pain/Comfort:  Pain Rating 1: 0/10    Objective:     Communicated with: Nurse prior to session.  Patient found up in chair with telemetry, oxygen, pulse ox (continuous), Condom Catheter upon OT entry to room.  A client care conference was completed by the OTR and the RILEY prior to  treatment by the RILEY to discuss the patient's POC and current status.   General Precautions: Standard, fall    Orthopedic Precautions:N/A  Braces: N/A  Respiratory Status: Nasal cannula, flow 4 L/min     Occupational Performance:     Functional Mobility/Transfers:  Patient completed Sit <> Stand Transfer with supervision  with  rolling walker   Patient completed Bed <> Chair Transfer using Stand Pivot technique with supervision with rolling walker  Functional Mobility: Patient ambulated 8ft x4 with Sup with RW.   Patient perform AROM exercises of B UE/LE for joint mobility and endurance.    Activities of Daily Living:  Lower Body Dressing: moderate assistance to don/doff socks       [facility] 6 Click ADL: 14    Treatment & Education:  REGI educated patient on the importance to continue to perform OOB activities to reduce debility from progressing and educated patient on performing breathing techniques and energy conservation techniques to reduce SOB.  Addressed patient's questions and concerns within RILEY scope of practice.    Patient left up in chair with all lines intact, call button in reach, and daughter present    GOALS:   Multidisciplinary Problems       Occupational Therapy Goals          Problem: Occupational Therapy    Goal Priority Disciplines Outcome Interventions   Occupational Therapy Goal     OT, PT/OT Ongoing, Progressing    Description: Goals to be met by: 12/24/2023     Patient will increase functional independence with ADLs by performing:    UE Dressing with Stand-by Assistance.  LE Dressing with Stand-by Assistance.  Grooming while standing at sink with Stand-by Assistance.  Toileting from toilet with Stand-by Assistance for hygiene and clothing management.   Supine to sit with Modified Jones.  Step transfer with Stand-by Assistance with LRAD as needed.   Toilet transfer to toilet with Stand-by Assistance with LRAD as needed.                         Time Tracking:     OT Date of Treatment:  12/12/23  OT Start Time: 1359  OT Stop Time: 1424  OT Total Time (min): 25 min    Billable Minutes:Therapeutic Activity 15  Therapeutic Exercise 10    OT/REGI: REGI     Number of REGI visits since last OT visit: 1    12/12/2023

## 2023-12-12 NOTE — PROGRESS NOTES
MDI delivered successfully  Pt has a good breath hold  Pt understands how to discharge MDI  Respiratory nebulizer treatment completed. No adverse reactions noted.

## 2023-12-12 NOTE — PROGRESS NOTES
[facility] - [facility] Care ([address] Medicine  Progress Note    Patient Name: Francois Otero Sr.  MRN: [id number]  Patient Class: IP- Inpatient   Admission Date: 12/5/2023  Length of Stay: 7 days  Attending Physician: Raymond Hurt MD  Primary Care Provider: Carley Kothari MD        Subjective:     Principal Problem:COPD exacerbation        HPI:  Francois Otero Sr. is a 78M with PMHx of CAD, hyperlipidemia, hypertension and COPD on 5 L of O2 at home presents to the ED c/o worsening shortness of breath for the last several days worse with exertion. Patient reports chronic dyspnea mostly with exertion, uses oxygen consistently with exertion and at night but does not always use it at rest. Baseline SpO2 88-92%. Reports that for the last several days he has noticed decreased exercise tolerance and worsening shortness of breath. Has prednisone at home for emergencies and reports using prednisone 40mg daily for the last 3 days without improvement in his symptoms. Has had prior COPD exacerbations in the past, and this is similar. Denies having any recent sick contacts. Denies having any other symptoms, including worse cough or fevers. Reports consistent use of his inhalers, but more as rescue inhaler and not daily maintenance inhalers. Pt is prior smoker.      ED course: Afebrile, HDS, tachycardic, requiring BiPAP 10/5 30% and satting >90%. CBC significant for WBC 23k, CMP with mild acidosis, nl renal function, covid/flu/rsv negative, BNP/trop negative, VBG 7.43/37/49/25. CXR with hyperinflated lung, emphysematous changes, no consolidation. Admit to MICU for COPD exacerbation requiring BiPAP.    Overview/Hospital Course:  Admitted to MICU for COPD exacerbation requiring BiPAP. Started on solumedrol, duonebs, and levaquin. Working to wean BiPAP and transition to Comfort Flow. Palliative Care to be consulted for additional assistance with GOC discussions.     Interval History:   No events overnight.  Patient reports no worsening of his breathing today. He was able to walk with PT without notable dyspnea. Weaning IV steroid to BID. C/o chronic abdominal distention. Family updated at bedside.       Review of Systems   Constitutional:  Negative for activity change, appetite change, chills, diaphoresis, fatigue and fever.   HENT:  Positive for congestion. Negative for rhinorrhea and sore throat.    Respiratory:  Positive for cough and shortness of breath. Negative for chest tightness.    Cardiovascular:  Negative for chest pain and palpitations.   Gastrointestinal:  Positive for abdominal distention. Negative for abdominal pain, constipation and nausea.   Endocrine: Negative for cold intolerance.   Genitourinary:  Negative for decreased urine volume and dysuria.   Musculoskeletal:  Negative for arthralgias and myalgias.   Skin:  Negative for rash and wound.   Neurological:  Negative for dizziness, weakness, numbness and headaches.   Psychiatric/Behavioral:  Negative for agitation, behavioral problems and confusion.      Objective:     Vital Signs (Most Recent):  Temp: 97.5 °F (36.4 °C) (12/12/23 1145)  Pulse: 105 (12/12/23 1506)  Resp: 18 (12/12/23 1300)  BP: (!) 172/82 (12/12/23 1145)  SpO2: 96 % (12/12/23 1300) Vital Signs (24h Range):  Temp:  [97.5 °F (36.4 °C)-99.1 °F (37.3 °C)] 97.5 °F (36.4 °C)  Pulse:  [] 105  Resp:  [13-26] 18  SpO2:  [30 %-98 %] 96 %  BP: (152-185)/(72-84) 172/82     Weight: 52 kg (114 lb 10.2 oz)  Body mass index is 20.97 kg/m².    Intake/Output Summary (Last 24 hours) at 12/12/2023 1554  Last data filed at 12/11/2023 1800  Gross per 24 hour   Intake 220 ml   Output 250 ml   Net -30 ml         Physical Exam  Constitutional:       Appearance: Normal appearance. He is normal weight. He is ill-appearing.   HENT:      Head: Normocephalic and atraumatic.      Mouth/Throat:      Mouth: Mucous membranes are moist.   Eyes:      Extraocular Movements: Extraocular movements intact.       Conjunctiva/sclera: Conjunctivae normal.   Cardiovascular:      Rate and Rhythm: Normal rate and regular rhythm.      Heart sounds: No murmur heard.  Pulmonary:      Effort: Pulmonary effort is normal. Prolonged expiration present. No respiratory distress.      Breath sounds: Normal breath sounds. No wheezing or rales.   Abdominal:      General: Abdomen is flat. There is no distension.      Palpations: Abdomen is soft.      Tenderness: There is no abdominal tenderness. There is no guarding.   Musculoskeletal:         General: No swelling or tenderness.   Skin:     Findings: No rash.   Neurological:      General: No focal deficit present.      Mental Status: He is alert and oriented to person, place, and time. Mental status is at baseline.   Psychiatric:         Mood and Affect: Mood normal.         Behavior: Behavior normal.             Significant Labs: All pertinent labs within the past 24 hours have been reviewed.  CBC:   Recent Labs   Lab 12/11/23 0344 12/12/23  0443   WBC 22.18* 26.45*   HGB 15.3 16.2   HCT 46.0 49.0    190     CMP:   Recent Labs   Lab 12/11/23 0344 12/12/23  0443    138   K 4.5 4.8    103   CO2 23 22*   * 161*   BUN 54* 55*   CREATININE 1.0 1.2   CALCIUM 9.3 9.8   PROT 6.6 6.6   ALBUMIN 3.6 3.6   BILITOT 0.5 0.6   ALKPHOS 84 81   AST 18 31   ALT 16 28   ANIONGAP 10 13       Significant Imaging: I have reviewed all pertinent imaging results/findings within the past 24 hours.    Assessment/Plan:      * COPD exacerbation  Because the patient is experiencing an acute worsening in baseline symptoms (such as cough, dyspnea, and/or sputum production) beyond normal daily variations to an extent that requires a change in therapy, they are in an acute COPD exacerbation.  Patient is is on oxygen at 5 L/min per nasal cannula..  They are currently exhibiting the following signs of a severe exacerbation: accessory respiratory muscle use and paradoxical chest wall movement.  As  such, their exacerbation is classified as severe.     - Admit to MICU due to his need for continuous BiPAP  - Duonebs q6h   - Breo qd  - Steroids tapering: solumedrol 40mg q12h  - Levaquin 750 mg daily x 5 days, eot 12/11  - Xanax prn, morphine prn for anxiety/dyspnea  - Supplemental oxygen with SpO2 goal > 88%  - Smoking cessation education  - PT/OT    History of benzodiazepine use  Prescribed Xanax 0.25 mg QHS PRN for anxiety since at least 2020.  Unable to access  at this time.  Given 1 mg Ativan IV x 2 in ED.     - Xanax 1mg prn    Anxiety  Managed inpatient with Xanax 1mg PRN       Palliative care encounter  Pt with chronic airway disease and frequent hospitalizations 2/t exacerbations. Pt now requiring increased supplemental O2 requirements while inpatient. If pt is able to be weaned off increased oxygen requirements, he remains a high risk for rehospitalization.   - Palliative Care consulted for GOC discussion with pt and family.  - DNR    Abdominal pain  - Chronic  - KUB 12/9: mild/mod gaseous distension , mild stool  - Bowel regimen       Hypertension  Listed on problem list, but no antihypertensives listed on home meds list.  SBP > 200 on presentation, down to 140s after 2 mg IV Ativan given.     - Losartan 25mg qd, nifedipine 30mg qd  - PRN labetalol IV for SBP > 170  - Will need close PCP follow-up after DC    COPD (chronic obstructive pulmonary disease)  See above      VTE Risk Mitigation (From admission, onward)           Ordered     heparin (porcine) injection 5,000 Units  Every 8 hours         12/09/23 1122     IP VTE HIGH RISK PATIENT  Once         12/05/23 1843     Place sequential compression device  Until discontinued         12/05/23 1843                    Discharge Planning   RONEL: 12/13/2023     Code Status: DNR   Is the patient medically ready for discharge?: No    Reason for patient still in hospital (select all that apply): Patient trending condition, Laboratory test, Treatment, and  Pending disposition  Discharge Plan A: Skilled Nursing Facility   Discharge Delays: None known at this time              Raymond Hurt MD  Department of Hospital Medicine   [facility] - Intensive Care ([place])

## 2023-12-12 NOTE — PLAN OF CARE
Pt slept well this shift. A&Ox 4. VSS. Pain and anxiety managed with PRN's. 4 eyes assessment completed. Skin care completed. Safety precautions in place. Call light in reach. No further concerns noted at this time.    Problem: Adult Inpatient Plan of Care  Goal: Plan of Care Review  Outcome: Ongoing, Progressing  Goal: Patient-Specific Goal (Individualized)  Outcome: Ongoing, Progressing  Goal: Absence of Hospital-Acquired Illness or Injury  Outcome: Ongoing, Progressing  Goal: Optimal Comfort and Wellbeing  Outcome: Ongoing, Progressing  Goal: Readiness for Transition of Care  Outcome: Ongoing, Progressing     Problem: Fluid and Electrolyte Imbalance (Acute Kidney Injury/Impairment)  Goal: Fluid and Electrolyte Balance  Outcome: Ongoing, Progressing     Problem: Oral Intake Inadequate (Acute Kidney Injury/Impairment)  Goal: Optimal Nutrition Intake  Outcome: Ongoing, Progressing     Problem: Renal Function Impairment (Acute Kidney Injury/Impairment)  Goal: Effective Renal Function  Outcome: Ongoing, Progressing     Problem: Skin Injury Risk Increased  Goal: Skin Health and Integrity  Outcome: Ongoing, Progressing     Problem: Coping Ineffective  Goal: Effective Coping  Outcome: Ongoing, Progressing     Problem: Fall Injury Risk  Goal: Absence of Fall and Fall-Related Injury  Outcome: Ongoing, Progressing

## 2023-12-12 NOTE — SUBJECTIVE & OBJECTIVE
Interval History:   No events overnight. Patient reports no worsening of his breathing today. He was able to walk with PT without notable dyspnea. Weaning IV steroid to BID. C/o chronic abdominal distention. Family updated at bedside.       Review of Systems   Constitutional:  Negative for activity change, appetite change, chills, diaphoresis, fatigue and fever.   HENT:  Positive for congestion. Negative for rhinorrhea and sore throat.    Respiratory:  Positive for cough and shortness of breath. Negative for chest tightness.    Cardiovascular:  Negative for chest pain and palpitations.   Gastrointestinal:  Positive for abdominal distention. Negative for abdominal pain, constipation and nausea.   Endocrine: Negative for cold intolerance.   Genitourinary:  Negative for decreased urine volume and dysuria.   Musculoskeletal:  Negative for arthralgias and myalgias.   Skin:  Negative for rash and wound.   Neurological:  Negative for dizziness, weakness, numbness and headaches.   Psychiatric/Behavioral:  Negative for agitation, behavioral problems and confusion.      Objective:     Vital Signs (Most Recent):  Temp: 97.5 °F (36.4 °C) (12/12/23 1145)  Pulse: 105 (12/12/23 1506)  Resp: 18 (12/12/23 1300)  BP: (!) 172/82 (12/12/23 1145)  SpO2: 96 % (12/12/23 1300) Vital Signs (24h Range):  Temp:  [97.5 °F (36.4 °C)-99.1 °F (37.3 °C)] 97.5 °F (36.4 °C)  Pulse:  [] 105  Resp:  [13-26] 18  SpO2:  [30 %-98 %] 96 %  BP: (152-185)/(72-84) 172/82     Weight: 52 kg (114 lb 10.2 oz)  Body mass index is 20.97 kg/m².    Intake/Output Summary (Last 24 hours) at 12/12/2023 1554  Last data filed at 12/11/2023 1800  Gross per 24 hour   Intake 220 ml   Output 250 ml   Net -30 ml         Physical Exam  Constitutional:       Appearance: Normal appearance. He is normal weight. He is ill-appearing.   HENT:      Head: Normocephalic and atraumatic.      Mouth/Throat:      Mouth: Mucous membranes are moist.   Eyes:      Extraocular Movements:  Extraocular movements intact.      Conjunctiva/sclera: Conjunctivae normal.   Cardiovascular:      Rate and Rhythm: Normal rate and regular rhythm.      Heart sounds: No murmur heard.  Pulmonary:      Effort: Pulmonary effort is normal. Prolonged expiration present. No respiratory distress.      Breath sounds: Normal breath sounds. No wheezing or rales.   Abdominal:      General: Abdomen is flat. There is no distension.      Palpations: Abdomen is soft.      Tenderness: There is no abdominal tenderness. There is no guarding.   Musculoskeletal:         General: No swelling or tenderness.   Skin:     Findings: No rash.   Neurological:      General: No focal deficit present.      Mental Status: He is alert and oriented to person, place, and time. Mental status is at baseline.   Psychiatric:         Mood and Affect: Mood normal.         Behavior: Behavior normal.             Significant Labs: All pertinent labs within the past 24 hours have been reviewed.  CBC:   Recent Labs   Lab 12/11/23  0344 12/12/23  0443   WBC 22.18* 26.45*   HGB 15.3 16.2   HCT 46.0 49.0    190     CMP:   Recent Labs   Lab 12/11/23  0344 12/12/23  0443    138   K 4.5 4.8    103   CO2 23 22*   * 161*   BUN 54* 55*   CREATININE 1.0 1.2   CALCIUM 9.3 9.8   PROT 6.6 6.6   ALBUMIN 3.6 3.6   BILITOT 0.5 0.6   ALKPHOS 84 81   AST 18 31   ALT 16 28   ANIONGAP 10 13       Significant Imaging: I have reviewed all pertinent imaging results/findings within the past 24 hours.

## 2023-12-12 NOTE — PLAN OF CARE
MICU DAILY GOALS     Family/Goals of care/Code Status   Code Status: DNR    24H Vital Sign Range  Temp:  [97.6 °F (36.4 °C)-99.5 °F (37.5 °C)]   Pulse:  []   Resp:  [14-28]   BP: (140-190)/(66-86)   SpO2:  [82 %-98 %]      Shift Events (include procedures and significant events)   No acute events throughout shift    AWAKE RASS: Goal - RASS Goal: 0-->alert and calm  Actual - RASS (Laguerre Agitation-Sedation Scale): alert and calm    Restraint necessity: Not necessary   BREATHE SBT: Not intubated    Coordinate A & B, analgesics/sedatives Pain: managed   SAT: Not intubated   Delirium CAM-ICU: Overall CAM-ICU: Negative   Early(intubated/ Progressive (non-intubated) Mobility MOVE Screen (INTUBATED ONLY): Not intubated    Activity: Activity Management: Up in chair - L3   Feeding/Nutrition Diet order: Diet/Nutrition Received: regular,     Thrombus DVT prophylaxis: VTE Required Core Measure: Pharmacological prophylaxis initiated/maintained   HOB Elevation Head of Bed (HOB) Positioning: HOB at 30-45 degrees, HOB at 60-90 degrees   Ulcer Prophylaxis GI: yes   Glucose control managed     Skin Skin assessed during: Q Shift Change/shift assessment    Sacrum intact/not altered? Yes  Heels intact/not altered? Yes  Surgical wound? No    [x] No Altered Skin Integrity Present    [x]Prevention Measures Documented    [] Altered Skin Integrity Present or Discovered   [] LDA present in EPIC              [] LDA added in EPIC   [] Wound Image Taken (required on admit,                   transfer/discharge and every Tuesday)    Wound Care Consulted? No    Attending Nurse: Sammie Torres RN/Staff Member:    Bowel Function diarrhea    Indwelling Catheter Necessity         Only PIV   De-escalation Antibiotics N/a       VS and assessment per flow sheet, patient progressing towards goals as tolerated, plan of care reviewed with  pt and dtr at bedside. , Concerns addressed, will continue to monitor.

## 2023-12-13 PROBLEM — R33.9 URINARY RETENTION: Status: ACTIVE | Noted: 2023-12-13

## 2023-12-13 LAB
ALBUMIN SERPL BCP-MCNC: 3.6 G/DL (ref 3.5–5.2)
ALP SERPL-CCNC: 77 U/L (ref 55–135)
ALT SERPL W/O P-5'-P-CCNC: 45 U/L (ref 10–44)
ANION GAP SERPL CALC-SCNC: 11 MMOL/L (ref 8–16)
AST SERPL-CCNC: 32 U/L (ref 10–40)
BASOPHILS NFR BLD: 0 % (ref 0–1.9)
BILIRUB SERPL-MCNC: 0.8 MG/DL (ref 0.1–1)
BUN SERPL-MCNC: 50 MG/DL (ref 8–23)
CALCIUM SERPL-MCNC: 9.3 MG/DL (ref 8.7–10.5)
CHLORIDE SERPL-SCNC: 105 MMOL/L (ref 95–110)
CO2 SERPL-SCNC: 25 MMOL/L (ref 23–29)
CREAT SERPL-MCNC: 0.9 MG/DL (ref 0.5–1.4)
DIFFERENTIAL METHOD: ABNORMAL
EOSINOPHIL NFR BLD: 0 % (ref 0–8)
ERYTHROCYTE [DISTWIDTH] IN BLOOD BY AUTOMATED COUNT: 14.8 % (ref 11.5–14.5)
EST. GFR  (NO RACE VARIABLE): >60 ML/MIN/1.73 M^2
GLUCOSE SERPL-MCNC: 108 MG/DL (ref 70–110)
HCT VFR BLD AUTO: 49.9 % (ref 40–54)
HGB BLD-MCNC: 16.2 G/DL (ref 14–18)
IMM GRANULOCYTES # BLD AUTO: ABNORMAL K/UL (ref 0–0.04)
IMM GRANULOCYTES NFR BLD AUTO: ABNORMAL % (ref 0–0.5)
LYMPHOCYTES NFR BLD: 24 % (ref 18–48)
MAGNESIUM SERPL-MCNC: 2.4 MG/DL (ref 1.6–2.6)
MCH RBC QN AUTO: 30.4 PG (ref 27–31)
MCHC RBC AUTO-ENTMCNC: 32.5 G/DL (ref 32–36)
MCV RBC AUTO: 94 FL (ref 82–98)
MONOCYTES NFR BLD: 7 % (ref 4–15)
NEUTROPHILS NFR BLD: 69 % (ref 38–73)
NRBC BLD-RTO: 0 /100 WBC
PHOSPHATE SERPL-MCNC: 2.7 MG/DL (ref 2.7–4.5)
PLATELET # BLD AUTO: 165 K/UL (ref 150–450)
PLATELET BLD QL SMEAR: ABNORMAL
PMV BLD AUTO: 11.4 FL (ref 9.2–12.9)
POTASSIUM SERPL-SCNC: 4.6 MMOL/L (ref 3.5–5.1)
PROT SERPL-MCNC: 6.5 G/DL (ref 6–8.4)
RBC # BLD AUTO: 5.33 M/UL (ref 4.6–6.2)
SODIUM SERPL-SCNC: 141 MMOL/L (ref 136–145)
WBC # BLD AUTO: 29.96 K/UL (ref 3.9–12.7)

## 2023-12-13 PROCEDURE — 36415 COLL VENOUS BLD VENIPUNCTURE: CPT

## 2023-12-13 PROCEDURE — 20600001 HC STEP DOWN PRIVATE ROOM

## 2023-12-13 PROCEDURE — 25500020 PHARM REV CODE 255: Performed by: STUDENT IN AN ORGANIZED HEALTH CARE EDUCATION/TRAINING PROGRAM

## 2023-12-13 PROCEDURE — 63600175 PHARM REV CODE 636 W HCPCS

## 2023-12-13 PROCEDURE — 63600175 PHARM REV CODE 636 W HCPCS: Performed by: HOSPITALIST

## 2023-12-13 PROCEDURE — 85027 COMPLETE CBC AUTOMATED: CPT

## 2023-12-13 PROCEDURE — 80053 COMPREHEN METABOLIC PANEL: CPT

## 2023-12-13 PROCEDURE — 25000003 PHARM REV CODE 250: Performed by: STUDENT IN AN ORGANIZED HEALTH CARE EDUCATION/TRAINING PROGRAM

## 2023-12-13 PROCEDURE — 94640 AIRWAY INHALATION TREATMENT: CPT

## 2023-12-13 PROCEDURE — 94761 N-INVAS EAR/PLS OXIMETRY MLT: CPT

## 2023-12-13 PROCEDURE — 83735 ASSAY OF MAGNESIUM: CPT

## 2023-12-13 PROCEDURE — 84100 ASSAY OF PHOSPHORUS: CPT

## 2023-12-13 PROCEDURE — 99900035 HC TECH TIME PER 15 MIN (STAT)

## 2023-12-13 PROCEDURE — 27000221 HC OXYGEN, UP TO 24 HOURS

## 2023-12-13 PROCEDURE — 25000003 PHARM REV CODE 250

## 2023-12-13 PROCEDURE — 85007 BL SMEAR W/DIFF WBC COUNT: CPT

## 2023-12-13 PROCEDURE — 25000003 PHARM REV CODE 250: Performed by: INTERNAL MEDICINE

## 2023-12-13 PROCEDURE — 63600175 PHARM REV CODE 636 W HCPCS: Performed by: STUDENT IN AN ORGANIZED HEALTH CARE EDUCATION/TRAINING PROGRAM

## 2023-12-13 PROCEDURE — 25000242 PHARM REV CODE 250 ALT 637 W/ HCPCS: Performed by: STUDENT IN AN ORGANIZED HEALTH CARE EDUCATION/TRAINING PROGRAM

## 2023-12-13 RX ORDER — PREDNISONE 20 MG/1
60 TABLET ORAL DAILY
Status: DISCONTINUED | OUTPATIENT
Start: 2023-12-14 | End: 2023-12-15

## 2023-12-13 RX ORDER — HYDRALAZINE HYDROCHLORIDE 25 MG/1
25 TABLET, FILM COATED ORAL EVERY 8 HOURS PRN
Status: DISCONTINUED | OUTPATIENT
Start: 2023-12-13 | End: 2023-12-20 | Stop reason: HOSPADM

## 2023-12-13 RX ORDER — HYDROXYZINE HYDROCHLORIDE 25 MG/1
25 TABLET, FILM COATED ORAL 3 TIMES DAILY PRN
Status: DISCONTINUED | OUTPATIENT
Start: 2023-12-13 | End: 2023-12-13

## 2023-12-13 RX ORDER — TAMSULOSIN HYDROCHLORIDE 0.4 MG/1
0.4 CAPSULE ORAL DAILY
Status: DISCONTINUED | OUTPATIENT
Start: 2023-12-13 | End: 2023-12-20 | Stop reason: HOSPADM

## 2023-12-13 RX ORDER — LEVALBUTEROL 1.25 MG/.5ML
1.25 SOLUTION, CONCENTRATE RESPIRATORY (INHALATION)
Status: DISCONTINUED | OUTPATIENT
Start: 2023-12-13 | End: 2023-12-20 | Stop reason: HOSPADM

## 2023-12-13 RX ORDER — IPRATROPIUM BROMIDE 0.5 MG/2.5ML
0.5 SOLUTION RESPIRATORY (INHALATION)
Status: DISCONTINUED | OUTPATIENT
Start: 2023-12-13 | End: 2023-12-20 | Stop reason: HOSPADM

## 2023-12-13 RX ORDER — MORPHINE SULFATE 2 MG/ML
2 INJECTION, SOLUTION INTRAMUSCULAR; INTRAVENOUS EVERY 4 HOURS PRN
Status: DISCONTINUED | OUTPATIENT
Start: 2023-12-13 | End: 2023-12-15

## 2023-12-13 RX ADMIN — IOHEXOL 75 ML: 350 INJECTION, SOLUTION INTRAVENOUS at 05:12

## 2023-12-13 RX ADMIN — TAMSULOSIN HYDROCHLORIDE 0.4 MG: 0.4 CAPSULE ORAL at 10:12

## 2023-12-13 RX ADMIN — HEPARIN SODIUM 5000 UNITS: 5000 INJECTION INTRAVENOUS; SUBCUTANEOUS at 05:12

## 2023-12-13 RX ADMIN — METHYLPREDNISOLONE SODIUM SUCCINATE 40 MG: 40 INJECTION, POWDER, FOR SOLUTION INTRAMUSCULAR; INTRAVENOUS at 05:12

## 2023-12-13 RX ADMIN — MORPHINE SULFATE 2 MG: 2 INJECTION, SOLUTION INTRAMUSCULAR; INTRAVENOUS at 02:12

## 2023-12-13 RX ADMIN — NIFEDIPINE 60 MG: 30 TABLET, FILM COATED, EXTENDED RELEASE ORAL at 10:12

## 2023-12-13 RX ADMIN — MORPHINE SULFATE 2 MG: 2 INJECTION, SOLUTION INTRAMUSCULAR; INTRAVENOUS at 05:12

## 2023-12-13 RX ADMIN — POLYETHYLENE GLYCOL 3350 17 G: 17 POWDER, FOR SOLUTION ORAL at 10:12

## 2023-12-13 RX ADMIN — HEPARIN SODIUM 5000 UNITS: 5000 INJECTION INTRAVENOUS; SUBCUTANEOUS at 02:12

## 2023-12-13 RX ADMIN — HEPARIN SODIUM 5000 UNITS: 5000 INJECTION INTRAVENOUS; SUBCUTANEOUS at 09:12

## 2023-12-13 RX ADMIN — LEVALBUTEROL 1.25 MG: 1.25 SOLUTION, CONCENTRATE RESPIRATORY (INHALATION) at 08:12

## 2023-12-13 RX ADMIN — PANTOPRAZOLE SODIUM 40 MG: 40 TABLET, DELAYED RELEASE ORAL at 10:12

## 2023-12-13 RX ADMIN — IPRATROPIUM BROMIDE 0.5 MG: 0.5 SOLUTION RESPIRATORY (INHALATION) at 01:12

## 2023-12-13 RX ADMIN — FLUTICASONE FUROATE AND VILANTEROL TRIFENATATE 1 PUFF: 200; 25 POWDER RESPIRATORY (INHALATION) at 08:12

## 2023-12-13 RX ADMIN — ALPRAZOLAM 0.5 MG: 0.5 TABLET ORAL at 11:12

## 2023-12-13 RX ADMIN — ALPRAZOLAM 0.5 MG: 0.5 TABLET ORAL at 09:12

## 2023-12-13 RX ADMIN — METHYLPREDNISOLONE SODIUM SUCCINATE 40 MG: 40 INJECTION, POWDER, FOR SOLUTION INTRAMUSCULAR; INTRAVENOUS at 06:12

## 2023-12-13 RX ADMIN — LEVALBUTEROL 1.25 MG: 1.25 SOLUTION, CONCENTRATE RESPIRATORY (INHALATION) at 01:12

## 2023-12-13 RX ADMIN — IPRATROPIUM BROMIDE 0.5 MG: 0.5 SOLUTION RESPIRATORY (INHALATION) at 08:12

## 2023-12-13 RX ADMIN — IPRATROPIUM BROMIDE 0.5 MG: 0.5 SOLUTION RESPIRATORY (INHALATION) at 09:12

## 2023-12-13 RX ADMIN — LEVALBUTEROL 1.25 MG: 1.25 SOLUTION, CONCENTRATE RESPIRATORY (INHALATION) at 09:12

## 2023-12-13 NOTE — PROGRESS NOTES
[facility] - [facility] ([address] Medicine  Progress Note    Patient Name: Francois Otero Sr.  MRN: [id number]  Patient Class: IP- Inpatient   Admission Date: 12/5/2023  Length of Stay: 8 days  Attending Physician: Raymond Hurt MD  Primary Care Provider: Carley Kothari MD        Subjective:     Principal Problem:COPD exacerbation        HPI:  Francois Otero Sr. is a 78M with PMHx of CAD, hyperlipidemia, hypertension and COPD on 5 L of O2 at home presents to the ED c/o worsening shortness of breath for the last several days worse with exertion. Patient reports chronic dyspnea mostly with exertion, uses oxygen consistently with exertion and at night but does not always use it at rest. Baseline SpO2 88-92%. Reports that for the last several days he has noticed decreased exercise tolerance and worsening shortness of breath. Has prednisone at home for emergencies and reports using prednisone 40mg daily for the last 3 days without improvement in his symptoms. Has had prior COPD exacerbations in the past, and this is similar. Denies having any recent sick contacts. Denies having any other symptoms, including worse cough or fevers. Reports consistent use of his inhalers, but more as rescue inhaler and not daily maintenance inhalers. Pt is prior smoker.      ED course: Afebrile, HDS, tachycardic, requiring BiPAP 10/5 30% and satting >90%. CBC significant for WBC 23k, CMP with mild acidosis, nl renal function, covid/flu/rsv negative, BNP/trop negative, VBG 7.43/37/49/25. CXR with hyperinflated lung, emphysematous changes, no consolidation. Admit to MICU for COPD exacerbation requiring BiPAP.    Overview/Hospital Course:  Admitted to MICU for COPD exacerbation requiring BiPAP. Started on solumedrol, duonebs, and levaquin. Working to wean BiPAP and transition to Comfort Flow. Palliative Care to be consulted for additional assistance with GOC discussions.  Patient steroids tapered as symptoms allow  along with nebulizers.  PT/OT consulted given patient's increased dyspnea symptoms limiting his physical conditioning.    Interval History:   Overnight patient with increased abdominal pain. NGT placed to suction. CT A/P with significant bladder distention. Akhtar placed with improvement for abdominal distention. Denies dyspnea.      Review of Systems   Constitutional:  Negative for activity change, appetite change, chills, diaphoresis, fatigue and fever.   HENT:  Positive for congestion. Negative for rhinorrhea and sore throat.    Respiratory:  Negative for cough, chest tightness and shortness of breath.    Cardiovascular:  Negative for chest pain and palpitations.   Gastrointestinal:  Negative for abdominal distention, abdominal pain, constipation and nausea.   Endocrine: Negative for cold intolerance.   Genitourinary:  Negative for decreased urine volume and dysuria.   Musculoskeletal:  Negative for arthralgias and myalgias.   Skin:  Negative for rash and wound.   Neurological:  Negative for dizziness, weakness, numbness and headaches.   Psychiatric/Behavioral:  Negative for agitation, behavioral problems and confusion. The patient is nervous/anxious.      Objective:     Vital Signs (Most Recent):  Temp: 97.6 °F (36.4 °C) (12/13/23 0428)  Pulse: 86 (12/13/23 0846)  Resp: 18 (12/13/23 0846)  BP: (!) 169/82 (12/13/23 0428)  SpO2: 96 % (12/13/23 0846) Vital Signs (24h Range):  Temp:  [97.6 °F (36.4 °C)-98 °F (36.7 °C)] 97.6 °F (36.4 °C)  Pulse:  [] 86  Resp:  [17-23] 18  SpO2:  [92 %-96 %] 96 %  BP: (146-173)/(60-82) 169/82     Weight: 52.1 kg (114 lb 13.8 oz)  Body mass index is 21.01 kg/m².    Intake/Output Summary (Last 24 hours) at 12/13/2023 1253  Last data filed at 12/13/2023 0248  Gross per 24 hour   Intake 350 ml   Output 250 ml   Net 100 ml         Physical Exam  Constitutional:       Appearance: Normal appearance. He is normal weight. He is ill-appearing.   HENT:      Head: Normocephalic and  atraumatic.      Mouth/Throat:      Mouth: Mucous membranes are moist.   Eyes:      Extraocular Movements: Extraocular movements intact.      Conjunctiva/sclera: Conjunctivae normal.   Cardiovascular:      Rate and Rhythm: Normal rate and regular rhythm.      Heart sounds: No murmur heard.  Pulmonary:      Effort: Pulmonary effort is normal. Prolonged expiration present. No respiratory distress.      Breath sounds: Normal breath sounds. No wheezing or rales.   Abdominal:      General: Abdomen is flat. Bowel sounds are normal. There is no distension.      Palpations: Abdomen is soft.      Tenderness: There is no abdominal tenderness. There is no guarding.   Musculoskeletal:         General: No swelling or tenderness.   Skin:     Findings: No rash.   Neurological:      General: No focal deficit present.      Mental Status: He is alert and oriented to person, place, and time. Mental status is at baseline.   Psychiatric:         Mood and Affect: Mood normal.         Behavior: Behavior normal.             Significant Labs: All pertinent labs within the past 24 hours have been reviewed.  CBC:   Recent Labs   Lab 12/12/23  0443 12/13/23  0542   WBC 26.45* 29.96*   HGB 16.2 16.2   HCT 49.0 49.9    165     CMP:   Recent Labs   Lab 12/12/23  0443 12/13/23  0542    141   K 4.8 4.6    105   CO2 22* 25   * 108   BUN 55* 50*   CREATININE 1.2 0.9   CALCIUM 9.8 9.3   PROT 6.6 6.5   ALBUMIN 3.6 3.6   BILITOT 0.6 0.8   ALKPHOS 81 77   AST 31 32   ALT 28 45*   ANIONGAP 13 11       Significant Imaging: I have reviewed all pertinent imaging results/findings within the past 24 hours.    Assessment/Plan:      * COPD exacerbation  Because the patient is experiencing an acute worsening in baseline symptoms (such as cough, dyspnea, and/or sputum production) beyond normal daily variations to an extent that requires a change in therapy, they are in an acute COPD exacerbation.  Patient is is on oxygen at 5 L/min per  nasal cannula..  They are currently exhibiting the following signs of a severe exacerbation: accessory respiratory muscle use and paradoxical chest wall movement.  As such, their exacerbation is classified as severe.     - Admit to MICU due to his need for continuous BiPAP  - Duonebs q6h while awake (or TID)  - Breo qd  - Steroids tapering: solumedrol 40mg q12h to Prednisone 60md daily on 12/14  - Levaquin 750 mg daily x 5 days, eot 12/11  - Xanax prn, morphine prn for anxiety/dyspnea  - Supplemental oxygen with SpO2 goal > 88%  - Smoking cessation education  - PT/OT    Urinary retention  - CT A/P with urinary bladder distention on 12/13  - Jennings placed with 1300cc UOP  - Continue jennings  - Started tamsulosin 0.4mg daily      History of benzodiazepine use  Prescribed Xanax 0.25 mg QHS PRN for anxiety since at least 2020.  Unable to access  at this time.  Given 1 mg Ativan IV x 2 in ED.     - Xanax 1mg prn    Anxiety  Managed inpatient with Xanax 1mg PRN       Palliative care encounter  Pt with chronic airway disease and frequent hospitalizations 2/t exacerbations. Pt now requiring increased supplemental O2 requirements while inpatient. If pt is able to be weaned off increased oxygen requirements, he remains a high risk for rehospitalization.   - Palliative Care consulted for C discussion with pt and family.  - DNR    Abdominal pain  - Chronic  - KUB 12/9: mild/mod gaseous distension , mild stool  - CT A/P on 12/13 with significant distension of the urinary bladder without upstream hydroureteronephrosis. Distension of the transverse colon with fecal material and air to the level of the distal descending colon.  No evidence of bowel obstruction.   - s/p jennings placement.   - Continue pantoja regimen     Hypertension  Listed on problem list, but no antihypertensives listed on home meds list.  SBP > 200 on presentation, down to 140s after 2 mg IV Ativan given.     - Losartan 25mg qd, nifedipine 30mg qd  - PRN labetalol  IV for SBP > 170  - Will need close PCP follow-up after DC    COPD (chronic obstructive pulmonary disease)  See above      VTE Risk Mitigation (From admission, onward)           Ordered     heparin (porcine) injection 5,000 Units  Every 8 hours         12/09/23 1122     IP VTE HIGH RISK PATIENT  Once         12/05/23 1843     Place sequential compression device  Until discontinued         12/05/23 1843                    Discharge Planning   RONEL: 12/15/2023     Code Status: DNR   Is the patient medically ready for discharge?: No    Reason for patient still in hospital (select all that apply): Patient trending condition, Laboratory test, Treatment, PT / OT recommendations, and Pending disposition  Discharge Plan A: Skilled Nursing Facility   Discharge Delays: None known at this time              Raymond Hurt MD  Department of Hospital Medicine   [facility] - Intensive Care ([place]

## 2023-12-13 NOTE — ASSESSMENT & PLAN NOTE
- CT A/P with urinary bladder distention on 12/13  - Jennings placed with 1300cc UOP  - Continue jennings  - Started tamsulosin 0.4mg daily

## 2023-12-13 NOTE — PLAN OF CARE
Pt slept very little this shift. A&Ox 4. VSS. Pain and anxiety managed with PRN's. Pt c/o increased ABD pain and distention. MD notified. Orders placed for xray, NGT, and CT. NGT placed to R nare to LIWS. Xray placement verified. Skin care completed. Safety precautions in place. Call light in reach. No further concerns noted at this time.     Problem: Adult Inpatient Plan of Care  Goal: Plan of Care Review  Outcome: Ongoing, Progressing  Goal: Patient-Specific Goal (Individualized)  Outcome: Ongoing, Progressing  Goal: Absence of Hospital-Acquired Illness or Injury  Outcome: Ongoing, Progressing  Goal: Optimal Comfort and Wellbeing  Outcome: Ongoing, Progressing  Goal: Readiness for Transition of Care  Outcome: Ongoing, Progressing     Problem: Fluid and Electrolyte Imbalance (Acute Kidney Injury/Impairment)  Goal: Fluid and Electrolyte Balance  Outcome: Ongoing, Progressing     Problem: Oral Intake Inadequate (Acute Kidney Injury/Impairment)  Goal: Optimal Nutrition Intake  Outcome: Ongoing, Progressing     Problem: Renal Function Impairment (Acute Kidney Injury/Impairment)  Goal: Effective Renal Function  Outcome: Ongoing, Progressing     Problem: Skin Injury Risk Increased  Goal: Skin Health and Integrity  Outcome: Ongoing, Progressing     Problem: Coping Ineffective  Goal: Effective Coping  Outcome: Ongoing, Progressing     Problem: Fall Injury Risk  Goal: Absence of Fall and Fall-Related Injury  Outcome: Ongoing, Progressing

## 2023-12-13 NOTE — SIGNIFICANT EVENT
Report per nursing pt with worsening abdominal distension tonight, worsening pain    Progress notes indicate chronic abdominal distension, plain films with gaseous distension of bowel    Nursing who cared for pt previously this admit report distension is acutely worse    Ng tube ordered to low intermittent suction    Pt seen later tonight at bedside, Ng suction just started - patient has active bowel sounds, distended abdomen tender centrally with light palpation.      Pts family atbedside and pt report when eating small amouts of food - will have early satiety worsening distension causing worsening dyspnea symptoms.     No CT abdomen in our system since 2018 will order CT abdomen pelvis with po/iv contrast         Piyush Rizzo M.D.  Attending Physician  [facility] Medicine Dept.  Pager: [phone]  Spectralink -x [phone]

## 2023-12-13 NOTE — PLAN OF CARE
Pt evaluated for contrast extravasation in the right forarm, almost 75 cc.     Pt feels some pressure in the injection site but not pain. Pt is able to move all fingers.    On examination:  Almost 7 cm swelling  No tenderness or erthema  Pulses intact   Motor intact  Sensation intact       Recommendation:  -Pt informed that it would take few days for the swelling to resolve. Arm raising, alternating cold and warm pads, might help.  -Pt instructed to come alert his MD if any change in hand sensation or pain to evaluate for compartment syndrome.        Mikey Solano MD  Radiology Resident PGY- 4  [facility]   Pager: [phone]

## 2023-12-13 NOTE — PLAN OF CARE
[facility] - Intensive Care ([facility]-14)  Discharge Reassessment    Pt stepped down from MICU overnight. Per MD team, pt is not medically ready for d/c at this time. Plan is to d/c to SNF when ready. Will continue to follow for needs.    Primary Care Provider: Carley Kothari MD    Expected Discharge Date: 12/15/2023    Reassessment (most recent)       Discharge Reassessment - 12/12/23 1047          Discharge Reassessment    Assessment Type Discharge Planning Reassessment     Did the patient's condition or plan change since previous assessment? No     Discharge Plan discussed with: Patient     Communicated RONEL with patient/caregiver Date not available/Unable to determine     Discharge Plan A Skilled Nursing Facility (P)      Discharge Plan B Home with family;Home Health (P)      DME Needed Upon Discharge  other (see comments) (P)    TBD    Transition of Care Barriers None (P)      Why the patient remains in the hospital Requires continued medical care (P)      Provided patient/caregiver education on the expected discharge date and the discharge plan Yes (P)      Do you have any problems affording any of your prescribed medications? TBD (P)         Post-Acute Status    Coverage [employer] Medicare (P)      Discharge Delays None known at this time (P)         Final Note    Anticipated Discharge Disposition Skilled Nursing Facility (P)                    Discharge Plan A and Plan B have been determined by review of patient's clinical status, future medical and therapeutic needs, and coverage/benefits for post-acute care in coordination with multidisciplinary team members.    Tasia Richards, RAGINI, LMSW    Case Management Department  [facility]

## 2023-12-13 NOTE — PLAN OF CARE
SW completed LOCET. SW faxed Level 2 PASRR to [facility] for SNF placement review and uploaded to [facility].     RAGINI Caban, LMSW    Case Management Department  [facility]

## 2023-12-13 NOTE — ASSESSMENT & PLAN NOTE
- Chronic  - KUB 12/9: mild/mod gaseous distension , mild stool  - CT A/P on 12/13 with significant distension of the urinary bladder without upstream hydroureteronephrosis. Distension of the transverse colon with fecal material and air to the level of the distal descending colon.  No evidence of bowel obstruction.   - s/p jennings placement.   - Continue pantoja regimen

## 2023-12-13 NOTE — PROGRESS NOTES
Urology Progress Note    Francois Otero Sr. is a 78M with PMHx of CAD, hyperlipidemia, hypertension and COPD currently admitted for COPD exacerbation.  Urology consulted for urinary retention and difficult jennings placement.      Patient reportedly went into urinary retention last night with wprsening abdominal pain.  CTAP shows significantly distended bladder with no hydronephrosis or nephrolithiasis bilaterally.  Jennings was placed overnight with 1300 mL of initial output.  Patient reportedly was in the bathroom today and his jennings catheter fell out with a deflated balloon.  Nursing attempted to replace the jennings catheter but met resistance and urology was consulted.      Patient seen and examined.  On assessment, pt is AFVSS, on 3L NC.  Cr 0.9.  WBC 29.  No recent UA or urine culture.      I placed an 18 Fr coude catheter without difficulty at bedside.  There was immediate return of 150 ccs of clear yellow urine.      - will arrange for outpatient urology f/u in 7-10 days for voiding trial   - continue flomax   - rest of care per primary   - please call with further questions or concerns.    Priyanka Dupree MD, PGY-2   [facility]

## 2023-12-13 NOTE — CARE UPDATE
"RAPID RESPONSE NURSE PROACTIVE ROUNDING NOTE       Time of Visit:     Admit Date: 2023  LOS: 7  Code Status: DNR   Date of Visit: 2023  : 1945  Age: 78 y.o.  Sex: male  Race: White  Bed: 93811/65108 A:   MRN: [id number]  Was the patient discharged from an ICU this admission? Yes   Was the patient discharged from a PACU within last 24 hours? No   Did the patient receive conscious sedation/general anesthesia in last 24 hours? No  Was the patient in the ED within the past 24 hours? No  Was the patient on NIPPV within the past 24 hours? Yes   Attending Physician: Raymond Hurt MD  Primary Service: [facility]OS[facility] MED B   Time spent at the bedside: 15 -30 min    SITUATION    Notified by charge RN via phone call.  Reason for alert: Enteral access  Called to evaluate the patient for  Abdominal distention - NGT placement    BACKGROUND     Why is the patient in the hospital?: COPD exacerbation    Patient has a past medical history of Colon polyp, COPD (chronic obstructive pulmonary disease), Coronary artery disease, Hyperlipidemia, Hypertension, On home oxygen therapy, Pneumonia due to COVID-19 virus 2020, Tobacco abuse, and Vertigo.    Last Vitals:  Temp: 98 °F (36.7 °C) (1952)  Pulse: 97 (2328)  Resp: 22 (2145)  BP: 164/80 (1952)  SpO2: 93 % (2328)    24 Hours Vitals Range:  Temp:  [97.5 °F (36.4 °C)-98.4 °F (36.9 °C)]   Pulse:  []   Resp:  [14-25]   BP: (146-182)/(60-82)   SpO2:  [30 %-98 %]     Labs:  Recent Labs     12/10/23  0238 23  0443   WBC 19.15* 22.18* 26.45*   HGB 14.7 15.3 16.2   HCT 43.6 46.0 49.0    167 190       Recent Labs     12/10/23  0238 12/11/23  0344 233    138 138   K 4.9 4.5 4.8    105 103   CO2 24 23 22*   BUN 55* 54* 55*   CREATININE 1.2 1.0 1.2   * 156* 161*   PHOS 3.0 3.0 2.6*   MG 2.6 2.7* 2.5        No results for input(s): "PH", "PCO2", "PO2", "HCO3", "POCSATURATED", "BE" in " the last 72 hours.     ASSESSMENT    Physical Exam  Constitutional:       General: He is awake.   HENT:      Mouth/Throat:      Mouth: Mucous membranes are moist.      Pharynx: Oropharynx is clear.   Eyes:      Pupils: Pupils are equal, round, and reactive to light.   Cardiovascular:      Rate and Rhythm: Regular rhythm. Tachycardia present.   Pulmonary:      Effort: Tachypnea present.   Abdominal:      General: There is distension.   Genitourinary:     Comments: Catheter in place  Skin:     General: Skin is warm and dry.      Capillary Refill: Capillary refill takes less than 2 seconds.   Neurological:      Mental Status: He is alert and oriented to person, place, and time.      GCS: GCS eye subscore is 4. GCS verbal subscore is 5. GCS motor subscore is 6.      Motor: Weakness present.   Psychiatric:         Behavior: Behavior is cooperative.         INTERVENTIONS    The patient was seen for Medical problem. Staff concerns included new abdominal distention requiring NGT placment. The following interventions were performed: continuous pulse ox monitoring continued, continuous cardiac monitoring continued, and NGT placement.    RECOMMENDATIONS    F/u with KUB results. NGT to LIS when confirmed. Reassess abdomen after LIS initiated per unit protocol. Maintain tele and IV access. Transition necessary meds to IV while NGT in place.     PROVIDER ESCALATION    Yes/No  Yes    Orders received and case discussed with Dr. Paulino .    Disposition: Remain in room 10793.    FOLLOW-UP    bedside RNNini  updated on plan of care. Instructed to call the Rapid Response Nurse, Priyank Thompson RN at [phone] for additional questions or concerns.

## 2023-12-13 NOTE — PROGRESS NOTES
Akhtar cath placed in to the bladder using sterile technique, immediate return of 1300 ml dark sixto urine to gravity. Pt tolerated well, stated relief of pressure to abd. NG tube remain in place to the right nares. IV to right upper are removed due to edema to the area. IV is present to the left AC.

## 2023-12-13 NOTE — SUBJECTIVE & OBJECTIVE
Interval History:   Overnight patient with increased abdominal pain. NGT placed to suction. CT A/P with significant bladder distention. Akhtar placed with improvement for abdominal distention. Denies dyspnea.      Review of Systems   Constitutional:  Negative for activity change, appetite change, chills, diaphoresis, fatigue and fever.   HENT:  Positive for congestion. Negative for rhinorrhea and sore throat.    Respiratory:  Negative for cough, chest tightness and shortness of breath.    Cardiovascular:  Negative for chest pain and palpitations.   Gastrointestinal:  Negative for abdominal distention, abdominal pain, constipation and nausea.   Endocrine: Negative for cold intolerance.   Genitourinary:  Negative for decreased urine volume and dysuria.   Musculoskeletal:  Negative for arthralgias and myalgias.   Skin:  Negative for rash and wound.   Neurological:  Negative for dizziness, weakness, numbness and headaches.   Psychiatric/Behavioral:  Negative for agitation, behavioral problems and confusion. The patient is nervous/anxious.      Objective:     Vital Signs (Most Recent):  Temp: 97.6 °F (36.4 °C) (12/13/23 0428)  Pulse: 86 (12/13/23 0846)  Resp: 18 (12/13/23 0846)  BP: (!) 169/82 (12/13/23 0428)  SpO2: 96 % (12/13/23 0846) Vital Signs (24h Range):  Temp:  [97.6 °F (36.4 °C)-98 °F (36.7 °C)] 97.6 °F (36.4 °C)  Pulse:  [] 86  Resp:  [17-23] 18  SpO2:  [92 %-96 %] 96 %  BP: (146-173)/(60-82) 169/82     Weight: 52.1 kg (114 lb 13.8 oz)  Body mass index is 21.01 kg/m².    Intake/Output Summary (Last 24 hours) at 12/13/2023 1253  Last data filed at 12/13/2023 0248  Gross per 24 hour   Intake 350 ml   Output 250 ml   Net 100 ml         Physical Exam  Constitutional:       Appearance: Normal appearance. He is normal weight. He is ill-appearing.   HENT:      Head: Normocephalic and atraumatic.      Mouth/Throat:      Mouth: Mucous membranes are moist.   Eyes:      Extraocular Movements: Extraocular movements  intact.      Conjunctiva/sclera: Conjunctivae normal.   Cardiovascular:      Rate and Rhythm: Normal rate and regular rhythm.      Heart sounds: No murmur heard.  Pulmonary:      Effort: Pulmonary effort is normal. Prolonged expiration present. No respiratory distress.      Breath sounds: Normal breath sounds. No wheezing or rales.   Abdominal:      General: Abdomen is flat. Bowel sounds are normal. There is no distension.      Palpations: Abdomen is soft.      Tenderness: There is no abdominal tenderness. There is no guarding.   Musculoskeletal:         General: No swelling or tenderness.   Skin:     Findings: No rash.   Neurological:      General: No focal deficit present.      Mental Status: He is alert and oriented to person, place, and time. Mental status is at baseline.   Psychiatric:         Mood and Affect: Mood normal.         Behavior: Behavior normal.             Significant Labs: All pertinent labs within the past 24 hours have been reviewed.  CBC:   Recent Labs   Lab 12/12/23  0443 12/13/23  0542   WBC 26.45* 29.96*   HGB 16.2 16.2   HCT 49.0 49.9    165     CMP:   Recent Labs   Lab 12/12/23  0443 12/13/23  0542    141   K 4.8 4.6    105   CO2 22* 25   * 108   BUN 55* 50*   CREATININE 1.2 0.9   CALCIUM 9.8 9.3   PROT 6.6 6.5   ALBUMIN 3.6 3.6   BILITOT 0.6 0.8   ALKPHOS 81 77   AST 31 32   ALT 28 45*   ANIONGAP 13 11       Significant Imaging: I have reviewed all pertinent imaging results/findings within the past 24 hours.

## 2023-12-13 NOTE — ASSESSMENT & PLAN NOTE
Because the patient is experiencing an acute worsening in baseline symptoms (such as cough, dyspnea, and/or sputum production) beyond normal daily variations to an extent that requires a change in therapy, they are in an acute COPD exacerbation.  Patient is is on oxygen at 5 L/min per nasal cannula..  They are currently exhibiting the following signs of a severe exacerbation: accessory respiratory muscle use and paradoxical chest wall movement.  As such, their exacerbation is classified as severe.     - Admit to MICU due to his need for continuous BiPAP  - Duonebs q6h while awake (or TID)  - Breo qd  - Steroids tapering: solumedrol 40mg q12h to Prednisone 60md daily on 12/14  - Levaquin 750 mg daily x 5 days, eot 12/11  - Xanax prn, morphine prn for anxiety/dyspnea  - Supplemental oxygen with SpO2 goal > 88%  - Smoking cessation education  - PT/OT

## 2023-12-14 LAB
ALBUMIN SERPL BCP-MCNC: 3.4 G/DL (ref 3.5–5.2)
ALP SERPL-CCNC: 77 U/L (ref 55–135)
ALT SERPL W/O P-5'-P-CCNC: 51 U/L (ref 10–44)
ANION GAP SERPL CALC-SCNC: 10 MMOL/L (ref 8–16)
AST SERPL-CCNC: 25 U/L (ref 10–40)
BACTERIA #/AREA URNS AUTO: ABNORMAL /HPF
BASOPHILS NFR BLD: 0 % (ref 0–1.9)
BILIRUB SERPL-MCNC: 0.8 MG/DL (ref 0.1–1)
BILIRUB UR QL STRIP: NEGATIVE
BUN SERPL-MCNC: 45 MG/DL (ref 8–23)
CALCIUM SERPL-MCNC: 9 MG/DL (ref 8.7–10.5)
CHLORIDE SERPL-SCNC: 100 MMOL/L (ref 95–110)
CLARITY UR REFRACT.AUTO: CLEAR
CO2 SERPL-SCNC: 27 MMOL/L (ref 23–29)
COLOR UR AUTO: YELLOW
CREAT SERPL-MCNC: 0.9 MG/DL (ref 0.5–1.4)
DIFFERENTIAL METHOD: ABNORMAL
EOSINOPHIL NFR BLD: 0 % (ref 0–8)
ERYTHROCYTE [DISTWIDTH] IN BLOOD BY AUTOMATED COUNT: 14.4 % (ref 11.5–14.5)
EST. GFR  (NO RACE VARIABLE): >60 ML/MIN/1.73 M^2
GLUCOSE SERPL-MCNC: 108 MG/DL (ref 70–110)
GLUCOSE UR QL STRIP: NEGATIVE
HCT VFR BLD AUTO: 45.1 % (ref 40–54)
HGB BLD-MCNC: 15 G/DL (ref 14–18)
HGB UR QL STRIP: ABNORMAL
HYALINE CASTS UR QL AUTO: 0 /LPF
IMM GRANULOCYTES # BLD AUTO: ABNORMAL K/UL (ref 0–0.04)
IMM GRANULOCYTES NFR BLD AUTO: ABNORMAL % (ref 0–0.5)
KETONES UR QL STRIP: NEGATIVE
LEUKOCYTE ESTERASE UR QL STRIP: ABNORMAL
LYMPHOCYTES NFR BLD: 35 % (ref 18–48)
MAGNESIUM SERPL-MCNC: 2.4 MG/DL (ref 1.6–2.6)
MCH RBC QN AUTO: 30.1 PG (ref 27–31)
MCHC RBC AUTO-ENTMCNC: 33.3 G/DL (ref 32–36)
MCV RBC AUTO: 91 FL (ref 82–98)
MICROSCOPIC COMMENT: ABNORMAL
MONOCYTES NFR BLD: 5 % (ref 4–15)
MYELOCYTES NFR BLD MANUAL: 1 %
NEUTROPHILS NFR BLD: 59 % (ref 38–73)
NEUTS BAND NFR BLD MANUAL: 0 %
NITRITE UR QL STRIP: NEGATIVE
NON-SQ EPI CELLS #/AREA URNS AUTO: 1 /HPF
NRBC BLD-RTO: 0 /100 WBC
PH UR STRIP: 6 [PH] (ref 5–8)
PHOSPHATE SERPL-MCNC: 2.6 MG/DL (ref 2.7–4.5)
PLATELET # BLD AUTO: 149 K/UL (ref 150–450)
PLATELET BLD QL SMEAR: ABNORMAL
PMV BLD AUTO: 11.4 FL (ref 9.2–12.9)
POTASSIUM SERPL-SCNC: 4.4 MMOL/L (ref 3.5–5.1)
PROT SERPL-MCNC: 6.2 G/DL (ref 6–8.4)
PROT UR QL STRIP: ABNORMAL
RBC # BLD AUTO: 4.98 M/UL (ref 4.6–6.2)
RBC #/AREA URNS AUTO: >100 /HPF (ref 0–4)
SODIUM SERPL-SCNC: 137 MMOL/L (ref 136–145)
SP GR UR STRIP: >1.03 (ref 1–1.03)
URN SPEC COLLECT METH UR: ABNORMAL
WBC # BLD AUTO: 33.22 K/UL (ref 3.9–12.7)
WBC #/AREA URNS AUTO: 57 /HPF (ref 0–5)

## 2023-12-14 PROCEDURE — 80053 COMPREHEN METABOLIC PANEL: CPT

## 2023-12-14 PROCEDURE — 97535 SELF CARE MNGMENT TRAINING: CPT | Mod: CO

## 2023-12-14 PROCEDURE — 97116 GAIT TRAINING THERAPY: CPT | Mod: CQ

## 2023-12-14 PROCEDURE — 97530 THERAPEUTIC ACTIVITIES: CPT | Mod: CO

## 2023-12-14 PROCEDURE — 99900035 HC TECH TIME PER 15 MIN (STAT)

## 2023-12-14 PROCEDURE — 94640 AIRWAY INHALATION TREATMENT: CPT

## 2023-12-14 PROCEDURE — 94799 UNLISTED PULMONARY SVC/PX: CPT | Mod: XB

## 2023-12-14 PROCEDURE — 25000003 PHARM REV CODE 250

## 2023-12-14 PROCEDURE — 25000003 PHARM REV CODE 250: Performed by: INTERNAL MEDICINE

## 2023-12-14 PROCEDURE — 20600001 HC STEP DOWN PRIVATE ROOM

## 2023-12-14 PROCEDURE — 87086 URINE CULTURE/COLONY COUNT: CPT | Performed by: HOSPITALIST

## 2023-12-14 PROCEDURE — 97110 THERAPEUTIC EXERCISES: CPT | Mod: CO

## 2023-12-14 PROCEDURE — 27000221 HC OXYGEN, UP TO 24 HOURS

## 2023-12-14 PROCEDURE — 83735 ASSAY OF MAGNESIUM: CPT

## 2023-12-14 PROCEDURE — 84100 ASSAY OF PHOSPHORUS: CPT

## 2023-12-14 PROCEDURE — 94761 N-INVAS EAR/PLS OXIMETRY MLT: CPT

## 2023-12-14 PROCEDURE — 63600175 PHARM REV CODE 636 W HCPCS: Performed by: STUDENT IN AN ORGANIZED HEALTH CARE EDUCATION/TRAINING PROGRAM

## 2023-12-14 PROCEDURE — 36415 COLL VENOUS BLD VENIPUNCTURE: CPT

## 2023-12-14 PROCEDURE — 85027 COMPLETE CBC AUTOMATED: CPT

## 2023-12-14 PROCEDURE — 25000242 PHARM REV CODE 250 ALT 637 W/ HCPCS: Performed by: STUDENT IN AN ORGANIZED HEALTH CARE EDUCATION/TRAINING PROGRAM

## 2023-12-14 PROCEDURE — 85007 BL SMEAR W/DIFF WBC COUNT: CPT

## 2023-12-14 PROCEDURE — 25000003 PHARM REV CODE 250: Performed by: STUDENT IN AN ORGANIZED HEALTH CARE EDUCATION/TRAINING PROGRAM

## 2023-12-14 PROCEDURE — 63600175 PHARM REV CODE 636 W HCPCS

## 2023-12-14 PROCEDURE — 81001 URINALYSIS AUTO W/SCOPE: CPT | Performed by: HOSPITALIST

## 2023-12-14 RX ADMIN — HEPARIN SODIUM 5000 UNITS: 5000 INJECTION INTRAVENOUS; SUBCUTANEOUS at 02:12

## 2023-12-14 RX ADMIN — POLYETHYLENE GLYCOL 3350 17 G: 17 POWDER, FOR SOLUTION ORAL at 09:12

## 2023-12-14 RX ADMIN — IPRATROPIUM BROMIDE 0.5 MG: 0.5 SOLUTION RESPIRATORY (INHALATION) at 08:12

## 2023-12-14 RX ADMIN — HEPARIN SODIUM 5000 UNITS: 5000 INJECTION INTRAVENOUS; SUBCUTANEOUS at 06:12

## 2023-12-14 RX ADMIN — LEVALBUTEROL 1.25 MG: 1.25 SOLUTION, CONCENTRATE RESPIRATORY (INHALATION) at 02:12

## 2023-12-14 RX ADMIN — PREDNISONE 60 MG: 20 TABLET ORAL at 09:12

## 2023-12-14 RX ADMIN — TAMSULOSIN HYDROCHLORIDE 0.4 MG: 0.4 CAPSULE ORAL at 09:12

## 2023-12-14 RX ADMIN — LEVALBUTEROL 1.25 MG: 1.25 SOLUTION, CONCENTRATE RESPIRATORY (INHALATION) at 08:12

## 2023-12-14 RX ADMIN — PANTOPRAZOLE SODIUM 40 MG: 40 TABLET, DELAYED RELEASE ORAL at 09:12

## 2023-12-14 RX ADMIN — FLUTICASONE FUROATE AND VILANTEROL TRIFENATATE 1 PUFF: 200; 25 POWDER RESPIRATORY (INHALATION) at 08:12

## 2023-12-14 RX ADMIN — IPRATROPIUM BROMIDE 0.5 MG: 0.5 SOLUTION RESPIRATORY (INHALATION) at 02:12

## 2023-12-14 RX ADMIN — ALPRAZOLAM 0.5 MG: 0.5 TABLET ORAL at 06:12

## 2023-12-14 RX ADMIN — NIFEDIPINE 60 MG: 30 TABLET, FILM COATED, EXTENDED RELEASE ORAL at 09:12

## 2023-12-14 RX ADMIN — ALPRAZOLAM 0.5 MG: 0.5 TABLET ORAL at 11:12

## 2023-12-14 RX ADMIN — HEPARIN SODIUM 5000 UNITS: 5000 INJECTION INTRAVENOUS; SUBCUTANEOUS at 09:12

## 2023-12-14 NOTE — PLAN OF CARE
Pt slept well this shift. A&Ox 4. VSS. Pain and anxiety managed with PRN's. ABD distention and tenderness greatly improved this shift. Akhtar in place and patent. UA sent this shift. Skin care completed. Safety precautions in place. Call light in reach. No further concerns noted at this time     Problem: Adult Inpatient Plan of Care  Goal: Plan of Care Review  Outcome: Ongoing, Progressing  Goal: Patient-Specific Goal (Individualized)  Outcome: Ongoing, Progressing  Goal: Absence of Hospital-Acquired Illness or Injury  Outcome: Ongoing, Progressing  Goal: Optimal Comfort and Wellbeing  Outcome: Ongoing, Progressing  Goal: Readiness for Transition of Care  Outcome: Ongoing, Progressing     Problem: Fluid and Electrolyte Imbalance (Acute Kidney Injury/Impairment)  Goal: Fluid and Electrolyte Balance  Outcome: Ongoing, Progressing     Problem: Oral Intake Inadequate (Acute Kidney Injury/Impairment)  Goal: Optimal Nutrition Intake  Outcome: Ongoing, Progressing     Problem: Renal Function Impairment (Acute Kidney Injury/Impairment)  Goal: Effective Renal Function  Outcome: Ongoing, Progressing     Problem: Skin Injury Risk Increased  Goal: Skin Health and Integrity  Outcome: Ongoing, Progressing     Problem: Coping Ineffective  Goal: Effective Coping  Outcome: Ongoing, Progressing     Problem: Fall Injury Risk  Goal: Absence of Fall and Fall-Related Injury  Outcome: Ongoing, Progressing     Problem: Infection  Goal: Absence of Infection Signs and Symptoms  Outcome: Ongoing, Progressing

## 2023-12-14 NOTE — PLAN OF CARE
Problem: Adult Inpatient Plan of Care  Goal: Plan of Care Review  Outcome: Ongoing, Progressing       Problem: Adult Inpatient Plan of Care  Goal: Optimal Comfort and Wellbeing  Outcome: Ongoing, Progressing   Problem: Oral Intake Inadequate (Acute Kidney Injury/Impairment)  Goal: Optimal Nutrition Intake  Outcome: Ongoing, Progressing     Problem: Fall Injury Risk  Goal: Absence of Fall and Fall-Related Injury  Outcome: Ongoing, Progressing

## 2023-12-14 NOTE — PLAN OF CARE
Patient rounded on through shift as per policy, Patient in stable condition with no complaints. Safety measures in place: bed in lowest position, three rails up, call light in reach, personal belonging in reach. No acute events during shift. Awaiting placement. Ongoing plan of care.     Problem: Adult Inpatient Plan of Care  Goal: Plan of Care Review  Outcome: Ongoing, Progressing  Goal: Patient-Specific Goal (Individualized)  Outcome: Ongoing, Progressing  Goal: Absence of Hospital-Acquired Illness or Injury  Outcome: Ongoing, Progressing  Goal: Optimal Comfort and Wellbeing  Outcome: Ongoing, Progressing  Goal: Readiness for Transition of Care  Outcome: Ongoing, Progressing     Problem: Fluid and Electrolyte Imbalance (Acute Kidney Injury/Impairment)  Goal: Fluid and Electrolyte Balance  Outcome: Ongoing, Progressing     Problem: Oral Intake Inadequate (Acute Kidney Injury/Impairment)  Goal: Optimal Nutrition Intake  Outcome: Ongoing, Progressing     Problem: Renal Function Impairment (Acute Kidney Injury/Impairment)  Goal: Effective Renal Function  Outcome: Ongoing, Progressing     Problem: Skin Injury Risk Increased  Goal: Skin Health and Integrity  Outcome: Ongoing, Progressing     Problem: Coping Ineffective  Goal: Effective Coping  Outcome: Ongoing, Progressing     Problem: Fall Injury Risk  Goal: Absence of Fall and Fall-Related Injury  Outcome: Ongoing, Progressing

## 2023-12-14 NOTE — PLAN OF CARE
[facility] - Intensive Care ([facility]-14)  Discharge Reassessment    Per MD team, pt is not medically ready for d/c at this time. Plan is to d/c to SNF when ready. Will continue to follow for needs.    Primary Care Provider: Carley Kothari MD    Expected Discharge Date: 12/18/2023    Reassessment (most recent)       Discharge Reassessment - 12/14/23 1157          Discharge Reassessment    Assessment Type Discharge Planning Reassessment (P)      Did the patient's condition or plan change since previous assessment? No (P)      Discharge Plan discussed with: Patient;Adult children (P)      Communicated RONEL with patient/caregiver Yes (P)      Discharge Plan A Skilled Nursing Facility (P)      Discharge Plan B Home with family;Home Health (P)      DME Needed Upon Discharge  other (see comments) (P)    TBD    Transition of Care Barriers None (P)      Why the patient remains in the hospital Requires continued medical care (P)         Post-Acute Status    Post-Acute Authorization Placement (P)      Post-Acute Placement Status Referrals Sent (P)      Coverage [employer] MA[employer]ED MEDICARE - [employer] MED[employer] HMO - (P)      Patient choice form signed by patient/caregiver List with quality metrics by geographic area provided (P)      Discharge Delays None known at this time (P)                    Discharge Plan A and Plan B have been determined by review of patient's clinical status, future medical and therapeutic needs, and coverage/benefits for post-acute care in coordination with multidisciplinary team members.    RAGINI Caban, LMSW    Case Management Department  [facility]

## 2023-12-14 NOTE — PROGRESS NOTES
Akhtar replaced and is draining well, NGT removed per Dr orders. Pt tolerated well, he is resting comfortably with family at the bedside.

## 2023-12-14 NOTE — PROGRESS NOTES
[facility] - [facility] ([address] Medicine  Progress Note    Patient Name: Francois Otero Sr.  MRN: [id number]  Patient Class: IP- Inpatient   Admission Date: 12/5/2023  Length of Stay: 9 days  Attending Physician: Mack Cameron*  Primary Care Provider: Carley Kothari MD        Subjective:     Principal Problem:COPD exacerbation        HPI:  Francois Otero Sr. is a 78M with PMHx of CAD, hyperlipidemia, hypertension and COPD on 5 L of O2 at home presents to the ED c/o worsening shortness of breath for the last several days worse with exertion. Patient reports chronic dyspnea mostly with exertion, uses oxygen consistently with exertion and at night but does not always use it at rest. Baseline SpO2 88-92%. Reports that for the last several days he has noticed decreased exercise tolerance and worsening shortness of breath. Has prednisone at home for emergencies and reports using prednisone 40mg daily for the last 3 days without improvement in his symptoms. Has had prior COPD exacerbations in the past, and this is similar. Denies having any recent sick contacts. Denies having any other symptoms, including worse cough or fevers. Reports consistent use of his inhalers, but more as rescue inhaler and not daily maintenance inhalers. Pt is prior smoker.      ED course: Afebrile, HDS, tachycardic, requiring BiPAP 10/5 30% and satting >90%. CBC significant for WBC 23k, CMP with mild acidosis, nl renal function, covid/flu/rsv negative, BNP/trop negative, VBG 7.43/37/49/25. CXR with hyperinflated lung, emphysematous changes, no consolidation. Admit to MICU for COPD exacerbation requiring BiPAP.    Overview/Hospital Course:  Admitted to MICU for COPD exacerbation requiring BiPAP. Started on solumedrol, duonebs, and levaquin. Working to wean BiPAP and transition to Comfort Flow. Palliative Care to be consulted for additional assistance with GOC discussions.  Patient steroids tapered as symptoms allow  along with nebulizers.  PT/OT consulted given patient's increased dyspnea symptoms limiting his physical conditioning.  Patient with worsening abdominal pain overnight refractory to NG tube placement.  CT abdomen/pelvis notable for significant urinary retention.  Jennings catheter placed with improvement of patient's abdominal pain and respiratory status with 1300 cc removed.  NG tube removed the following day.  Jennings catheter accidentally removed and nursing with difficulty replacing.  Jennings then placed by Urology and patient started on tamsulosin.  Outpatient urology follow-up needed.    Interval History: Pt reports improvement in abdominal symptoms following jennings placement. Tolerating po   Reports did not feel short of breath when working with PT today. Currently on 3L NC    Review of Systems  Objective:     Vital Signs (Most Recent):  Temp: 98.6 °F (37 °C) (12/14/23 1150)  Pulse: 98 (12/14/23 1150)  Resp: 19 (12/14/23 1150)  BP: (!) 155/73 (12/14/23 1150)  SpO2: 95 % (12/14/23 1150) Vital Signs (24h Range):  Temp:  [97.3 °F (36.3 °C)-98.6 °F (37 °C)] 98.6 °F (37 °C)  Pulse:  [] 98  Resp:  [15-26] 19  SpO2:  [92 %-97 %] 95 %  BP: (146-155)/() 155/73     Weight: 52 kg (114 lb 10.2 oz)  Body mass index is 20.97 kg/m².    Intake/Output Summary (Last 24 hours) at 12/14/2023 1334  Last data filed at 12/14/2023 0800  Gross per 24 hour   Intake 480 ml   Output 800 ml   Net -320 ml         Physical Exam  Constitutional:       Appearance: Normal appearance. He is normal weight. He is ill-appearing.   HENT:      Head: Normocephalic and atraumatic.      Mouth/Throat:      Mouth: Mucous membranes are moist.   Eyes:      Extraocular Movements: Extraocular movements intact.      Conjunctiva/sclera: Conjunctivae normal.   Cardiovascular:      Rate and Rhythm: Normal rate and regular rhythm.      Heart sounds: No murmur heard.  Pulmonary:      Effort: Pulmonary effort is normal. Prolonged expiration present. No  respiratory distress.      Breath sounds: Normal breath sounds. No wheezing or rales.   Abdominal:      General: Abdomen is flat. Bowel sounds are normal. There is no distension.      Palpations: Abdomen is soft.      Tenderness: There is no abdominal tenderness. There is no guarding.   Musculoskeletal:         General: No swelling or tenderness.   Skin:     Findings: No rash.   Neurological:      General: No focal deficit present.      Mental Status: He is alert and oriented to person, place, and time. Mental status is at baseline.   Psychiatric:         Mood and Affect: Mood normal.         Behavior: Behavior normal.             Significant Labs: All pertinent labs within the past 24 hours have been reviewed.    Significant Imaging: I have reviewed all pertinent imaging results/findings within the past 24 hours.    Assessment/Plan:      * COPD exacerbation  Because the patient is experiencing an acute worsening in baseline symptoms (such as cough, dyspnea, and/or sputum production) beyond normal daily variations to an extent that requires a change in therapy, they are in an acute COPD exacerbation.  Patient is is on oxygen at 5 L/min per nasal cannula..  They are currently exhibiting the following signs of a severe exacerbation: accessory respiratory muscle use and paradoxical chest wall movement.  As such, their exacerbation is classified as severe.     - Admit to MICU due to his need for continuous BiPAP  - Duonebs q6h while awake (or TID)  - Breo qd  - Steroids tapering: solumedrol 40mg q12h to Prednisone 60md daily on 12/14  - Levaquin 750 mg daily x 5 days, eot 12/11  - Xanax prn, morphine prn for anxiety/dyspnea  - Supplemental oxygen with SpO2 goal > 88%  - Smoking cessation education  - PT/OT    Urinary retention  - CT A/P with urinary bladder distention on 12/13  - Jennings placed with 1300cc UOP  - Continue jennings  - Started tamsulosin 0.4mg daily      History of benzodiazepine use  Prescribed Xanax 0.25 mg  QHS PRN for anxiety since at least 2020.  Unable to access  at this time.  Given 1 mg Ativan IV x 2 in ED.     - Xanax 1mg prn    Anxiety  Managed inpatient with Xanax 1mg PRN       Palliative care encounter  Pt with chronic airway disease and frequent hospitalizations 2/t exacerbations. Pt now requiring increased supplemental O2 requirements while inpatient. If pt is able to be weaned off increased oxygen requirements, he remains a high risk for rehospitalization.   - Palliative Care consulted for GOC discussion with pt and family.  - DNR    Abdominal pain  - Chronic  - KUB 12/9: mild/mod gaseous distension , mild stool  - CT A/P on 12/13 with significant distension of the urinary bladder without upstream hydroureteronephrosis. Distension of the transverse colon with fecal material and air to the level of the distal descending colon.  No evidence of bowel obstruction.   - s/p jennings placement.   - Continue pantoja regimen     Hypertension  Listed on problem list, but no antihypertensives listed on home meds list.  SBP > 200 on presentation, down to 140s after 2 mg IV Ativan given.     - Losartan 25mg qd, nifedipine 30mg qd  - PRN labetalol IV for SBP > 170  - Will need close PCP follow-up after DC    COPD (chronic obstructive pulmonary disease)  See above      VTE Risk Mitigation (From admission, onward)           Ordered     heparin (porcine) injection 5,000 Units  Every 8 hours         12/09/23 1122     IP VTE HIGH RISK PATIENT  Once         12/05/23 1843     Place sequential compression device  Until discontinued         12/05/23 1843                    Discharge Planning   RONEL: 12/18/2023     Code Status: DNR   Is the patient medically ready for discharge?: No    Reason for patient still in hospital (select all that apply): Patient trending condition and Treatment  Discharge Plan A: Skilled Nursing Facility   Discharge Delays: None known at this time      Mack Cameron MD  Department of Hospital  Medicine   [facility] - [facility] ([place]-[facility])

## 2023-12-14 NOTE — SUBJECTIVE & OBJECTIVE
Interval History: Pt reports improvement in abdominal symptoms following jennings placement. Tolerating po   Reports did not feel short of breath when working with PT today. Currently on 3L NC    Review of Systems  Objective:     Vital Signs (Most Recent):  Temp: 98.6 °F (37 °C) (12/14/23 1150)  Pulse: 98 (12/14/23 1150)  Resp: 19 (12/14/23 1150)  BP: (!) 155/73 (12/14/23 1150)  SpO2: 95 % (12/14/23 1150) Vital Signs (24h Range):  Temp:  [97.3 °F (36.3 °C)-98.6 °F (37 °C)] 98.6 °F (37 °C)  Pulse:  [] 98  Resp:  [15-26] 19  SpO2:  [92 %-97 %] 95 %  BP: (146-155)/() 155/73     Weight: 52 kg (114 lb 10.2 oz)  Body mass index is 20.97 kg/m².    Intake/Output Summary (Last 24 hours) at 12/14/2023 1334  Last data filed at 12/14/2023 0800  Gross per 24 hour   Intake 480 ml   Output 800 ml   Net -320 ml         Physical Exam  Constitutional:       Appearance: Normal appearance. He is normal weight. He is ill-appearing.   HENT:      Head: Normocephalic and atraumatic.      Mouth/Throat:      Mouth: Mucous membranes are moist.   Eyes:      Extraocular Movements: Extraocular movements intact.      Conjunctiva/sclera: Conjunctivae normal.   Cardiovascular:      Rate and Rhythm: Normal rate and regular rhythm.      Heart sounds: No murmur heard.  Pulmonary:      Effort: Pulmonary effort is normal. Prolonged expiration present. No respiratory distress.      Breath sounds: Normal breath sounds. No wheezing or rales.   Abdominal:      General: Abdomen is flat. Bowel sounds are normal. There is no distension.      Palpations: Abdomen is soft.      Tenderness: There is no abdominal tenderness. There is no guarding.   Musculoskeletal:         General: No swelling or tenderness.   Skin:     Findings: No rash.   Neurological:      General: No focal deficit present.      Mental Status: He is alert and oriented to person, place, and time. Mental status is at baseline.   Psychiatric:         Mood and Affect: Mood normal.          Behavior: Behavior normal.             Significant Labs: All pertinent labs within the past 24 hours have been reviewed.    Significant Imaging: I have reviewed all pertinent imaging results/findings within the past 24 hours.

## 2023-12-14 NOTE — PROGRESS NOTES
Pt drank some of the liquid diet served to him and did well. He was assisted up to the bed side commode and his jennings was out with the balloon deflated. I attempted to replace the jennings and met resistance and it was painful to him so I stopped. He has the condom cath on just for his comfort, MD notified. Will consult urology.

## 2023-12-14 NOTE — PLAN OF CARE
12/14/23 1159   Post-Acute Status   Post-Acute Authorization Placement   Post-Acute Placement Status Referrals Sent   Coverage [employer] MANAGED MEDICARE - [facility] MEDICARE HMO -   Patient choice form signed by patient/caregiver List with quality metrics by geographic area provided   Discharge Delays None known at this time   Discharge Plan   Discharge Plan A Skilled Nursing Facility   Discharge Plan B Home with family;Home Health     Met with pt/family to review discharge recommendation of SNF and is agreeable to plan. 142 uploaded to [employer].    Patient/family provided list of facilities in-network with patient's payor plan. Providers that are owned, operated, or affiliated with [facility] are included on the list.     Notified that referral sent to below listed facilities from in-network list based on proximity to home/family support:     1. [facility] Phone: [phone]    2. [facility] Phone: [phone]    3. [facility] Phone: [phone]    4. [facility] Phone: [phone]  5. [facility] Phone: [phone]       Patient/family instructed to identify preference.    Preferred Facility: (if more than 1, listed in order of descending preference)    [facility]    If an additional preferred facility not listed above is identified, additional referral to be sent. If above facilities unable to accept, will send additional referrals to in-network providers.     Discharge Plan A and Plan B have been determined by review of patient's clinical status, future medical and therapeutic needs, and coverage/benefits for post-acute care in coordination with multidisciplinary team members.    RAGINI Caban, LMSW    Case Management Department  [facility]

## 2023-12-14 NOTE — PT/OT/SLP PROGRESS
Occupational Therapy   Treatment    Name: Francois Otero Sr.  MRN: [id number]  Admitting Diagnosis:  COPD exacerbation       Recommendations:     Discharge Recommendations: Moderate Intensity Therapy  Discharge Equipment Recommendations:  walker, rolling  Barriers to discharge:  Inaccessible home environment, Decreased caregiver support    Assessment:     Francois Otero Sr. is a 78 y.o. male with a medical diagnosis of COPD exacerbation.  He presents with the following performance deficits affecting function are weakness, impaired endurance, impaired self care skills, gait instability, decreased lower extremity function, impaired cardiopulmonary response to activity, decreased safety awareness, impaired functional mobility.     Pt very cooperative and pleasant during tx session. He demonstrates improvement with functional mobility with RW and no LOB during session. However, pt demonstrates decreased endurance and SOB limiting his overall performance with occupational-based tasks and functional mobility requiring standing and seated rest breaks as needed. Pt would continue to benefit from OT intervention to maximize independence and improve performance deficits to ensure safe return to PLOF.     Rehab Prognosis:  Good; patient would benefit from acute skilled OT services to address these deficits and reach maximum level of function.       Plan:     Patient to be seen 4 x/week to address the above listed problems via self-care/home management, therapeutic activities, therapeutic exercises  Plan of Care Expires: 01/08/24  Plan of Care Reviewed with: patient, daughter    Subjective     Chief Complaint: shortness of breath  Patient/Family Comments/goals: To get better and be able to return home  Pain/Comfort:  Pain Rating 1: 0/10    Objective:     Communicated with: RN prior to session.  Patient found supine with oxygen, pulse ox (continuous), telemetry, jennings catheter upon OT entry to room.  A client care conference was  completed by the OTR and the RILEY prior to treatment by the RILEY to discuss the patient's POC and current status.     General Precautions: Standard, fall    Orthopedic Precautions:N/A  Braces: N/A  Respiratory Status: Nasal cannula, flow 3.5 L/min     Occupational Performance:     Bed Mobility:    Patient completed Scooting EOB with stand by assistance  Patient completed Supine to Sit with stand by assistance       Functional Mobility/Transfers:  Patient completed Sit <> Stand Transfer with minimum assistance and verbal cueing for hand placement  with  rolling walker   Patient completed Bed <> Chair Transfer using Stand Pivot technique with CGA-minimum assistance with rolling walker  Patient completed Toilet Transfer with RW via Stand Pivot technique with minimum assistance for line management and verbal cueing to ensure safe, proper technique and direction following of transfer.   Pt ambulated with RW CGA ~40 feet in hallway with 4L O2 via nasal cannula with one standing rest break.  Then, pt ambulated with RW CGA ~40 feet back to pt's room. Pt sat upright EOB ~ 5 minutes with SBA.    Activities of Daily Living:  Toileting: total assistance for perianal hygiene after bowel movement while pt standing with RW      AMPAC 6 Click ADL: 14    Treatment & Education:  Pt performed B UE AROM exercises seated in bedside chair, 2 sets x 5 reps ea of the following:   B scapula elevation/depression  B shoulder AROM holding pillow: flexion/extension, chest press  B shoulder horizontal abduction/adduction    Pt educated and instructed on the following:  the importance of energy conservation  Sitting up in chair/OOB mobility as tolerated with assistance   Using call bell for assistance   B UE AROM twice per day, 2 sets x 5 reps ea to improve endurance and strength   Safety with transfers to ensure proper technique      Patient left up in chair with all lines intact, call button in reach, and daughter present    GOALS:    Multidisciplinary Problems       Occupational Therapy Goals          Problem: Occupational Therapy    Goal Priority Disciplines Outcome Interventions   Occupational Therapy Goal     OT, PT/OT Ongoing, Progressing    Description: Goals to be met by: 12/24/2023     Patient will increase functional independence with ADLs by performing:    UE Dressing with Stand-by Assistance.  LE Dressing with Stand-by Assistance.  Grooming while standing at sink with Stand-by Assistance.  Toileting from toilet with Stand-by Assistance for hygiene and clothing management.   Supine to sit with Modified [place].  Step transfer with Stand-by Assistance with LRAD as needed.   Toilet transfer to toilet with Stand-by Assistance with LRAD as needed.                         Time Tracking:     OT Date of Treatment: 12/14/23  OT Start Time: 0930  OT Stop Time: 1010  OT Total Time (min): 40 min    Billable Minutes:Self Care/Home Management 10 minutes  Therapeutic Activity 20 minutes  Therapeutic Exercise 10 minutes    OT/REGI: REGI     Number of REGI visits since last OT visit: 2    12/14/2023

## 2023-12-14 NOTE — PT/OT/SLP PROGRESS
Physical Therapy Treatment    Patient Name:  Francois Otero Sr.   MRN:  [id number]    Recommendations:     Discharge Recommendations: Moderate Intensity Therapy  Discharge Equipment Recommendations: walker, rolling  Barriers to discharge: None    Assessment:     Francois Otero Sr. is a 78 y.o. male admitted with a medical diagnosis of COPD exacerbation.  He presents with the following impairments/functional limitations: weakness, impaired endurance, impaired cardiopulmonary response to activity, gait instability Pt tolerated treatment session well today. Pt ambulated with rest breaks required due to SOB. Sp02 was recorded at 90% at its lowest during ambulation, yet increased to 95% with rest and pursed lip breathing.     Rehab Prognosis: Good; patient would benefit from acute skilled PT services to address these deficits and reach maximum level of function.    Recent Surgery: * No surgery found *      Plan:     During this hospitalization, patient to be seen 4 x/week to address the identified rehab impairments via gait training, therapeutic activities, therapeutic exercises, neuromuscular re-education and progress toward the following goals:    Plan of Care Expires:  01/10/24    Subjective     Chief Complaint: SOB with ambulation   Patient/Family Comments/goals: Pt agreeable to PT   Pain/Comfort:  Pain Rating 1: 0/10      Objective:     Communicated with RN prior to session.  Patient found up in chair with telemetry, pulse ox (continuous), jennings catheter upon PT entry to room.     General Precautions: Standard, fall  Orthopedic Precautions: N/A  Braces: N/A  Respiratory Status: Nasal cannula, flow 3.5 L/min while seated in chair. Increased to 4 L/min during ambulation.      Functional Mobility:  Transfers:     Sit to Stand:  stand by assistance with rolling walker  Gait: 50 ft + 100ft + 100 ft CGA with RW  rest breaks required due to SOB  Pt required cues for safety awareness and to stay within RW   During ambulation pt  "c/o SOB Sp02 90% during ambulation yet increased to 95% with rest break and cues for pursed lip breathing    Pt performed 10 repetitions of seated B LE exercises consisting of: Marching, LAQ, ABD/ADD, heel raises, and toe raises.    Pt declined practicing ascending and descending steps "I don't want to push it."       AM-PAC 6 CLICK MOBILITY  Turning over in bed (including adjusting bedclothes, sheets and blankets)?: 3  Sitting down on and standing up from a chair with arms (e.g., wheelchair, bedside commode, etc.): 3  Moving from lying on back to sitting on the side of the bed?: 3  Moving to and from a bed to a chair (including a wheelchair)?: 3  Need to walk in hospital room?: 3  Climbing 3-5 steps with a railing?: 2  Basic Mobility Total Score: 17       Treatment & Education:  Therapist provided instruction and educated for safety during transfers and gait training. As well as proper body mechanics, energy conservation, and fall prevention strategies during tasks listed above, and the effects of prolonged immobility and the importance of performing EOB/OOB activity and exercises to promote healing and reduce recovery time.      Patient left up in chair with all lines intact, call button in reach, and RN notified..    GOALS:   Multidisciplinary Problems       Physical Therapy Goals          Problem: Physical Therapy    Goal Priority Disciplines Outcome Goal Variances Interventions   Physical Therapy Goal     PT, PT/OT Ongoing, Progressing     Description: Goals to be met by: 23     Patient will increase functional independence with mobility by performin. Supine to sit with [place]  2. Sit to supine with [place]  3. Sit to stand transfer with Supervision  4. Bed to chair transfer with Supervision using No Assistive Device  5. Gait  x 150 feet with Supervision using No Assistive Device.   6. Ascend/descend 4 stair with unilateral handrails Supervision using No Assistive Device.               "            Time Tracking:     PT Received On: 12/14/23  PT Start Time: 1222     PT Stop Time: 1242  PT Total Time (min): 20 min     Billable Minutes: Gait Training 20    Treatment Type: Treatment  PT/PTA: PTA     Number of PTA visits since last PT visit: 2     12/14/2023

## 2023-12-15 LAB
ALBUMIN SERPL BCP-MCNC: 3 G/DL (ref 3.5–5.2)
ALP SERPL-CCNC: 67 U/L (ref 55–135)
ALT SERPL W/O P-5'-P-CCNC: 44 U/L (ref 10–44)
ANION GAP SERPL CALC-SCNC: 7 MMOL/L (ref 8–16)
AST SERPL-CCNC: 22 U/L (ref 10–40)
BACTERIA UR CULT: NO GROWTH
BASOPHILS # BLD AUTO: 0.12 K/UL (ref 0–0.2)
BASOPHILS NFR BLD: 0.5 % (ref 0–1.9)
BILIRUB SERPL-MCNC: 0.7 MG/DL (ref 0.1–1)
BUN SERPL-MCNC: 43 MG/DL (ref 8–23)
CALCIUM SERPL-MCNC: 8.6 MG/DL (ref 8.7–10.5)
CHLORIDE SERPL-SCNC: 101 MMOL/L (ref 95–110)
CO2 SERPL-SCNC: 29 MMOL/L (ref 23–29)
CREAT SERPL-MCNC: 0.8 MG/DL (ref 0.5–1.4)
DIFFERENTIAL METHOD: ABNORMAL
EOSINOPHIL # BLD AUTO: 0 K/UL (ref 0–0.5)
EOSINOPHIL NFR BLD: 0.1 % (ref 0–8)
ERYTHROCYTE [DISTWIDTH] IN BLOOD BY AUTOMATED COUNT: 14.7 % (ref 11.5–14.5)
EST. GFR  (NO RACE VARIABLE): >60 ML/MIN/1.73 M^2
GLUCOSE SERPL-MCNC: 90 MG/DL (ref 70–110)
HCT VFR BLD AUTO: 40.7 % (ref 40–54)
HGB BLD-MCNC: 14 G/DL (ref 14–18)
IMM GRANULOCYTES # BLD AUTO: 0.51 K/UL (ref 0–0.04)
IMM GRANULOCYTES NFR BLD AUTO: 2 % (ref 0–0.5)
LYMPHOCYTES # BLD AUTO: 12.1 K/UL (ref 1–4.8)
LYMPHOCYTES NFR BLD: 48.2 % (ref 18–48)
MAGNESIUM SERPL-MCNC: 2.3 MG/DL (ref 1.6–2.6)
MCH RBC QN AUTO: 29.9 PG (ref 27–31)
MCHC RBC AUTO-ENTMCNC: 34.4 G/DL (ref 32–36)
MCV RBC AUTO: 87 FL (ref 82–98)
MONOCYTES # BLD AUTO: 1.7 K/UL (ref 0.3–1)
MONOCYTES NFR BLD: 6.6 % (ref 4–15)
NEUTROPHILS # BLD AUTO: 10.7 K/UL (ref 1.8–7.7)
NEUTROPHILS NFR BLD: 42.6 % (ref 38–73)
NRBC BLD-RTO: 0 /100 WBC
PHOSPHATE SERPL-MCNC: 2.7 MG/DL (ref 2.7–4.5)
PLATELET # BLD AUTO: 133 K/UL (ref 150–450)
PMV BLD AUTO: 11.1 FL (ref 9.2–12.9)
POTASSIUM SERPL-SCNC: 4.1 MMOL/L (ref 3.5–5.1)
PROT SERPL-MCNC: 5.4 G/DL (ref 6–8.4)
RBC # BLD AUTO: 4.68 M/UL (ref 4.6–6.2)
SODIUM SERPL-SCNC: 137 MMOL/L (ref 136–145)
WBC # BLD AUTO: 25.08 K/UL (ref 3.9–12.7)

## 2023-12-15 PROCEDURE — 97530 THERAPEUTIC ACTIVITIES: CPT | Mod: CQ

## 2023-12-15 PROCEDURE — 63600175 PHARM REV CODE 636 W HCPCS: Performed by: STUDENT IN AN ORGANIZED HEALTH CARE EDUCATION/TRAINING PROGRAM

## 2023-12-15 PROCEDURE — 80053 COMPREHEN METABOLIC PANEL: CPT

## 2023-12-15 PROCEDURE — 99900035 HC TECH TIME PER 15 MIN (STAT)

## 2023-12-15 PROCEDURE — 25000003 PHARM REV CODE 250: Performed by: STUDENT IN AN ORGANIZED HEALTH CARE EDUCATION/TRAINING PROGRAM

## 2023-12-15 PROCEDURE — 94761 N-INVAS EAR/PLS OXIMETRY MLT: CPT

## 2023-12-15 PROCEDURE — 36415 COLL VENOUS BLD VENIPUNCTURE: CPT

## 2023-12-15 PROCEDURE — 94640 AIRWAY INHALATION TREATMENT: CPT

## 2023-12-15 PROCEDURE — 27100171 HC OXYGEN HIGH FLOW UP TO 24 HOURS

## 2023-12-15 PROCEDURE — 85007 BL SMEAR W/DIFF WBC COUNT: CPT

## 2023-12-15 PROCEDURE — 94660 CPAP INITIATION&MGMT: CPT

## 2023-12-15 PROCEDURE — 83735 ASSAY OF MAGNESIUM: CPT

## 2023-12-15 PROCEDURE — 85027 COMPLETE CBC AUTOMATED: CPT

## 2023-12-15 PROCEDURE — 30200315 PPD INTRADERMAL TEST REV CODE 302: Performed by: STUDENT IN AN ORGANIZED HEALTH CARE EDUCATION/TRAINING PROGRAM

## 2023-12-15 PROCEDURE — 86580 TB INTRADERMAL TEST: CPT | Performed by: STUDENT IN AN ORGANIZED HEALTH CARE EDUCATION/TRAINING PROGRAM

## 2023-12-15 PROCEDURE — 97116 GAIT TRAINING THERAPY: CPT | Mod: CQ

## 2023-12-15 PROCEDURE — 25000003 PHARM REV CODE 250: Performed by: INTERNAL MEDICINE

## 2023-12-15 PROCEDURE — 20600001 HC STEP DOWN PRIVATE ROOM

## 2023-12-15 PROCEDURE — 25000242 PHARM REV CODE 250 ALT 637 W/ HCPCS: Performed by: STUDENT IN AN ORGANIZED HEALTH CARE EDUCATION/TRAINING PROGRAM

## 2023-12-15 PROCEDURE — 63600175 PHARM REV CODE 636 W HCPCS

## 2023-12-15 PROCEDURE — 25000003 PHARM REV CODE 250

## 2023-12-15 PROCEDURE — 84100 ASSAY OF PHOSPHORUS: CPT

## 2023-12-15 RX ORDER — PREDNISONE 20 MG/1
40 TABLET ORAL DAILY
Status: DISCONTINUED | OUTPATIENT
Start: 2023-12-16 | End: 2023-12-18

## 2023-12-15 RX ADMIN — LEVALBUTEROL 1.25 MG: 1.25 SOLUTION, CONCENTRATE RESPIRATORY (INHALATION) at 08:12

## 2023-12-15 RX ADMIN — LEVALBUTEROL 1.25 MG: 1.25 SOLUTION, CONCENTRATE RESPIRATORY (INHALATION) at 02:12

## 2023-12-15 RX ADMIN — FLUTICASONE FUROATE AND VILANTEROL TRIFENATATE 1 PUFF: 200; 25 POWDER RESPIRATORY (INHALATION) at 02:12

## 2023-12-15 RX ADMIN — HEPARIN SODIUM 5000 UNITS: 5000 INJECTION INTRAVENOUS; SUBCUTANEOUS at 02:12

## 2023-12-15 RX ADMIN — TAMSULOSIN HYDROCHLORIDE 0.4 MG: 0.4 CAPSULE ORAL at 09:12

## 2023-12-15 RX ADMIN — HEPARIN SODIUM 5000 UNITS: 5000 INJECTION INTRAVENOUS; SUBCUTANEOUS at 09:12

## 2023-12-15 RX ADMIN — Medication 6 MG: at 09:12

## 2023-12-15 RX ADMIN — IPRATROPIUM BROMIDE 0.5 MG: 0.5 SOLUTION RESPIRATORY (INHALATION) at 08:12

## 2023-12-15 RX ADMIN — PREDNISONE 60 MG: 20 TABLET ORAL at 09:12

## 2023-12-15 RX ADMIN — POLYETHYLENE GLYCOL 3350 17 G: 17 POWDER, FOR SOLUTION ORAL at 09:12

## 2023-12-15 RX ADMIN — ALPRAZOLAM 0.5 MG: 0.5 TABLET ORAL at 09:12

## 2023-12-15 RX ADMIN — IPRATROPIUM BROMIDE 0.5 MG: 0.5 SOLUTION RESPIRATORY (INHALATION) at 02:12

## 2023-12-15 RX ADMIN — NIFEDIPINE 60 MG: 30 TABLET, FILM COATED, EXTENDED RELEASE ORAL at 09:12

## 2023-12-15 RX ADMIN — PANTOPRAZOLE SODIUM 40 MG: 40 TABLET, DELAYED RELEASE ORAL at 09:12

## 2023-12-15 RX ADMIN — TUBERCULIN PURIFIED PROTEIN DERIVATIVE 5 UNITS: 5 INJECTION, SOLUTION INTRADERMAL at 09:12

## 2023-12-15 RX ADMIN — HEPARIN SODIUM 5000 UNITS: 5000 INJECTION INTRAVENOUS; SUBCUTANEOUS at 05:12

## 2023-12-15 NOTE — PROGRESS NOTES
[facility] - Intensive Care ([address] Medicine  Progress Note    Patient Name: Francois Otero Sr.  MRN: [id number]  Patient Class: IP- Inpatient   Admission Date: 12/5/2023  Length of Stay: 10 days  Attending Physician: Mack Cameron*  Primary Care Provider: Carley Kothari MD        Subjective:     Principal Problem:COPD exacerbation        HPI:  Francois Otero Sr. is a 78M with PMHx of CAD, hyperlipidemia, hypertension and COPD on 5 L of O2 at home presents to the ED c/o worsening shortness of breath for the last several days worse with exertion. Patient reports chronic dyspnea mostly with exertion, uses oxygen consistently with exertion and at night but does not always use it at rest. Baseline SpO2 88-92%. Reports that for the last several days he has noticed decreased exercise tolerance and worsening shortness of breath. Has prednisone at home for emergencies and reports using prednisone 40mg daily for the last 3 days without improvement in his symptoms. Has had prior COPD exacerbations in the past, and this is similar. Denies having any recent sick contacts. Denies having any other symptoms, including worse cough or fevers. Reports consistent use of his inhalers, but more as rescue inhaler and not daily maintenance inhalers. Pt is prior smoker.      ED course: Afebrile, HDS, tachycardic, requiring BiPAP 10/5 30% and satting >90%. CBC significant for WBC 23k, CMP with mild acidosis, nl renal function, covid/flu/rsv negative, BNP/trop negative, VBG 7.43/37/49/25. CXR with hyperinflated lung, emphysematous changes, no consolidation. Admit to MICU for COPD exacerbation requiring BiPAP.    Overview/Hospital Course:  Admitted to MICU for COPD exacerbation requiring BiPAP. Started on solumedrol, duonebs, and levaquin. Working to wean BiPAP and transition to Comfort Flow. Palliative Care to be consulted for additional assistance with GOC discussions.  Patient steroids tapered as symptoms  allow along with nebulizers.  PT/OT consulted given patient's increased dyspnea symptoms limiting his physical conditioning.  Patient with worsening abdominal pain overnight refractory to NG tube placement.  CT abdomen/pelvis notable for significant urinary retention.  Akhtar catheter placed with improvement of patient's abdominal pain and respiratory status with 1300 cc removed.  NG tube removed the following day.  Akhtar catheter accidentally removed and nursing with difficulty replacing.  Akhtar then placed by Urology and patient started on tamsulosin.  Outpatient urology follow-up needed. Abdominal symptoms relieved. Oxygen weaned successfully.     Interval History: Pt reports improvement in breathing. Weaning oxygen was on 3 L NC this am. Wore bipap overnight. Tolerated breakfast this am without problems.     Review of Systems  Objective:     Vital Signs (Most Recent):  Temp: 98 °F (36.7 °C) (12/15/23 1205)  Pulse: 87 (12/15/23 1205)  Resp: 20 (12/15/23 1205)  BP: 134/64 (12/15/23 1205)  SpO2: 96 % (12/15/23 1205) Vital Signs (24h Range):  Temp:  [97.5 °F (36.4 °C)-98.4 °F (36.9 °C)] 98 °F (36.7 °C)  Pulse:  [] 87  Resp:  [16-26] 20  SpO2:  [89 %-100 %] 96 %  BP: (124-167)/(64-74) 134/64     Weight: 52 kg (114 lb 10.2 oz)  Body mass index is 20.97 kg/m².    Intake/Output Summary (Last 24 hours) at 12/15/2023 1301  Last data filed at 12/15/2023 0445  Gross per 24 hour   Intake --   Output 700 ml   Net -700 ml         Physical Exam  Constitutional:       Appearance: Normal appearance. He is normal weight. He is ill-appearing.   HENT:      Head: Normocephalic and atraumatic.      Mouth/Throat:      Mouth: Mucous membranes are moist.   Eyes:      Extraocular Movements: Extraocular movements intact.      Conjunctiva/sclera: Conjunctivae normal.   Cardiovascular:      Rate and Rhythm: Normal rate and regular rhythm.      Heart sounds: No murmur heard.  Pulmonary:      Effort: Pulmonary effort is normal. No  respiratory distress.      Breath sounds: Normal breath sounds. No wheezing or rales.      Comments: Reduced breath sounds  Abdominal:      General: Abdomen is flat. Bowel sounds are normal. There is no distension.      Palpations: Abdomen is soft.      Tenderness: There is no abdominal tenderness. There is no guarding.   Musculoskeletal:         General: No swelling or tenderness.   Skin:     Findings: No rash.   Neurological:      General: No focal deficit present.      Mental Status: He is alert and oriented to person, place, and time. Mental status is at baseline.             Significant Labs: All pertinent labs within the past 24 hours have been reviewed.    Significant Imaging: I have reviewed all pertinent imaging results/findings within the past 24 hours.    Assessment/Plan:      * COPD exacerbation  Because the patient is experiencing an acute worsening in baseline symptoms (such as cough, dyspnea, and/or sputum production) beyond normal daily variations to an extent that requires a change in therapy, they are in an acute COPD exacerbation.  Patient is is on oxygen at 5 L/min per nasal cannula..  They are currently exhibiting the following signs of a severe exacerbation: accessory respiratory muscle use and paradoxical chest wall movement.  As such, their exacerbation is classified as severe.     - Admit to MICU due to his need for continuous BiPAP  - Duonebs q6h while awake (or TID)  - Breo qd  - Steroids tapering: solumedrol 40mg q12h to Prednisone 60md daily on 12/14  - Levaquin 750 mg daily x 5 days, eot 12/11  - Xanax prn, morphine prn for anxiety/dyspnea  - Supplemental oxygen with SpO2 goal > 88%  - Smoking cessation education  - PT/OT    Urinary retention  - CT A/P with urinary bladder distention on 12/13  - Jennings placed with 1300cc UOP  - Continue jennings  - Started tamsulosin 0.4mg daily      History of benzodiazepine use  Prescribed Xanax 0.25 mg QHS PRN for anxiety since at least 2020.   - Xanax  prn    Anxiety  Managed inpatient with Xanax 1mg PRN       Palliative care encounter  Pt with chronic airway disease and frequent hospitalizations 2/t exacerbations.   - Palliative Care consulted for [facility] discussion with pt and family.  - DNR  - weaning oxygen    Abdominal pain  - Chronic  - KUB 12/9: mild/mod gaseous distension , mild stool  - CT A/P on 12/13 with significant distension of the urinary bladder without upstream hydroureteronephrosis. Distension of the transverse colon with fecal material and air to the level of the distal descending colon.  No evidence of bowel obstruction.   - s/p jennings placement.   - Continue pantoja regimen     Hypertension  Listed on problem list, but no antihypertensives listed on home meds list.  SBP > 200 on presentation, down to 140s after 2 mg IV Ativan given.     - Losartan 25mg qd, nifedipine 30mg qd  - PRN labetalol IV for SBP > 170  - Will need close PCP follow-up after DC    COPD (chronic obstructive pulmonary disease)  See above      VTE Risk Mitigation (From admission, onward)           Ordered     heparin (porcine) injection 5,000 Units  Every 8 hours         12/09/23 1122     IP VTE HIGH RISK PATIENT  Once         12/05/23 1843     Place sequential compression device  Until discontinued         12/05/23 1843                    Discharge Planning   RONEL: 12/15/2023     Code Status: DNR   Is the patient medically ready for discharge?: No    Reason for patient still in hospital (select all that apply): Patient trending condition and Treatment  Discharge Plan A: Skilled Nursing Facility   Discharge Delays: None known at this time        Mack Cameron MD  Department of Hospital Medicine   [facility] - Intensive Care ([place]-14)

## 2023-12-15 NOTE — NURSING
End Of Shift    Diagnosis:  COPD exacerbation     Plan:  placement    Mentation:  A/Ox4. [place] but follows commands     Respiratory:  4LNC. Premedicated with xanax prior to BIPAP, but was on BIPAP for 2 hours before switching back to NC.      Cardiac:  NSR 80s. SBP 130s.      GI: cardiac diet. Abd soft. Pt denies abd pain upon palpation, states distention is getting better.     :  jennings in place for retention. 400ml out.      Mobility:  1 person assist from chair to bed and back.     Skin: intact     Lines, Drains, & Tubes:  L AC     Family/Friends:  daughter remains at bedside

## 2023-12-15 NOTE — SUBJECTIVE & OBJECTIVE
Interval History: Pt reports improvement in breathing. Weaning oxygen was on 3 L NC this am. Wore bipap overnight. Tolerated breakfast this am without problems.     Review of Systems  Objective:     Vital Signs (Most Recent):  Temp: 98 °F (36.7 °C) (12/15/23 1205)  Pulse: 87 (12/15/23 1205)  Resp: 20 (12/15/23 1205)  BP: 134/64 (12/15/23 1205)  SpO2: 96 % (12/15/23 1205) Vital Signs (24h Range):  Temp:  [97.5 °F (36.4 °C)-98.4 °F (36.9 °C)] 98 °F (36.7 °C)  Pulse:  [] 87  Resp:  [16-26] 20  SpO2:  [89 %-100 %] 96 %  BP: (124-167)/(64-74) 134/64     Weight: 52 kg (114 lb 10.2 oz)  Body mass index is 20.97 kg/m².    Intake/Output Summary (Last 24 hours) at 12/15/2023 1301  Last data filed at 12/15/2023 0445  Gross per 24 hour   Intake --   Output 700 ml   Net -700 ml         Physical Exam  Constitutional:       Appearance: Normal appearance. He is normal weight. He is ill-appearing.   HENT:      Head: Normocephalic and atraumatic.      Mouth/Throat:      Mouth: Mucous membranes are moist.   Eyes:      Extraocular Movements: Extraocular movements intact.      Conjunctiva/sclera: Conjunctivae normal.   Cardiovascular:      Rate and Rhythm: Normal rate and regular rhythm.      Heart sounds: No murmur heard.  Pulmonary:      Effort: Pulmonary effort is normal. No respiratory distress.      Breath sounds: Normal breath sounds. No wheezing or rales.      Comments: Reduced breath sounds  Abdominal:      General: Abdomen is flat. Bowel sounds are normal. There is no distension.      Palpations: Abdomen is soft.      Tenderness: There is no abdominal tenderness. There is no guarding.   Musculoskeletal:         General: No swelling or tenderness.   Skin:     Findings: No rash.   Neurological:      General: No focal deficit present.      Mental Status: He is alert and oriented to person, place, and time. Mental status is at baseline.             Significant Labs: All pertinent labs within the past 24 hours have been  reviewed.    Significant Imaging: I have reviewed all pertinent imaging results/findings within the past 24 hours.

## 2023-12-15 NOTE — ASSESSMENT & PLAN NOTE
Pt with chronic airway disease and frequent hospitalizations 2/t exacerbations.   - Palliative Care consulted for GOC discussion with pt and family.  - DNR  - weaning oxygen

## 2023-12-15 NOTE — PT/OT/SLP PROGRESS
Physical Therapy Treatment    Patient Name:  Francois Otero Sr.   MRN:  [id number]    Recommendations:     Discharge Recommendations: Moderate Intensity Therapy  Discharge Equipment Recommendations: walker, rolling  Barriers to discharge: None    Assessment:     Francois Otero Sr. is a 78 y.o. male admitted with a medical diagnosis of COPD exacerbation.  He presents with the following impairments/functional limitations: weakness, impaired endurance, impaired cardiopulmonary response to activity. Pt tolerated treatment session well today. Pt tolerated ambulation well. Pt was able to tolerate ascending and descending 4 steps. Pt required standing rest breaks during ambulation due to fatigue and SOB that resolved with pursed lip breathing.  Patient remains appropriate for continued skilled services within the acute environment and goals remain appropriate.      Rehab Prognosis: Good; patient would benefit from acute skilled PT services to address these deficits and reach maximum level of function.    Recent Surgery: * No surgery found *      Plan:     During this hospitalization, patient to be seen 4 x/week to address the identified rehab impairments via gait training, therapeutic activities, therapeutic exercises, neuromuscular re-education and progress toward the following goals:    Plan of Care Expires:  01/10/24    Subjective     Chief Complaint: None stated   Patient/Family Comments/goals: Pt agreeable to PT   Pain/Comfort:  Pain Rating 1: 0/10      Objective:     Communicated with Rn prior to session.  Patient found up in chair receiving medication from RN with telemetry, pulse ox (continuous), jennings catheter upon PT entry to room.     General Precautions: Standard, fall  Orthopedic Precautions: N/A  Braces: N/A  Respiratory Status: Nasal cannula, flow 3 L/min     Functional Mobility:  Transfers:     Sit to Stand:  stand by assistance with rolling walker  Gait: 50 ft + 100 ft to stairwell + 80 ft back to room with  CGA to SBA with RW and PTA management of portable oxygen tank 4 L/min.   Standing rest breaks required due to fatigue and SOB, yet resolved with pursed lip breathing.   Cues for safety within RW   Pt ascended and descended 4 steps CGA.       AM-PAC 6 CLICK MOBILITY  Turning over in bed (including adjusting bedclothes, sheets and blankets)?: 3  Sitting down on and standing up from a chair with arms (e.g., wheelchair, bedside commode, etc.): 3  Moving from lying on back to sitting on the side of the bed?: 3  Moving to and from a bed to a chair (including a wheelchair)?: 3  Need to walk in hospital room?: 3  Climbing 3-5 steps with a railing?: 3  Basic Mobility Total Score: 18       Treatment & Education:  Therapist provided instruction and educated for safety during transfers and gait training. As well as proper body mechanics, energy conservation, and fall prevention strategies during tasks listed above, and the effects of prolonged immobility and the importance of performing EOB/OOB activity and exercises to promote healing and reduce recovery time.      Patient left  on bedside commode and educated not to sit on bedside commode to long and to call for nurse once finished   with all lines intact, call button in reach, Rn notified, and daughter present..    GOALS:   Multidisciplinary Problems       Physical Therapy Goals          Problem: Physical Therapy    Goal Priority Disciplines Outcome Goal Variances Interventions   Physical Therapy Goal     PT, PT/OT Ongoing, Progressing     Description: Goals to be met by: 23     Patient will increase functional independence with mobility by performin. Supine to sit with Harris  2. Sit to supine with Harris  3. Sit to stand transfer with Supervision  4. Bed to chair transfer with Supervision using No Assistive Device  5. Gait  x 150 feet with Supervision using No Assistive Device.   6. Ascend/descend 4 stair with unilateral handrails Supervision using  No Assistive Device.                          Time Tracking:     PT Received On: 12/15/23  PT Start Time: 0941     PT Stop Time: 1014  PT Total Time (min): 33 min     Billable Minutes: Gait Training 20 and Therapeutic Activity 13    Treatment Type: Treatment  PT/PTA: PTA     Number of PTA visits since last PT visit: 3     12/15/2023

## 2023-12-15 NOTE — PLAN OF CARE
Patient has no accepting Skilled facilities at this time. Denied By [facility], [facility] currently reviewing. !0 additional referrals sent. Will continue to follow-up. RONEL 12/18/23 at this time.        1030AM-Met with patient and daughter at bedside. Daughter upset as she would like patient to go to [facility] and they have denied stating patient's O2 requirements are too high for their facility. CM called facility to discuss at daughter's request. Facility requested updated progress notes to review.      1150AM-Spoke with [facility] who reports they will not be able to accept patient due to O2 needs and high risk for readmission. Blast referrals sent as patient has multiple denials at this time. Updated patient's daughter of situation. Patient likely to remain inpatient through the weekend.      Malissa Garcia RNCM  Case Management  [facility]  [phone]

## 2023-12-15 NOTE — NURSING
Patient has rested comfortably in the chair after working with therapy today.  Has remained above 92% on 2L nasal cannula.  Daughter at the bedside with call light in reach.

## 2023-12-16 LAB
ALBUMIN SERPL BCP-MCNC: 2.8 G/DL (ref 3.5–5.2)
ALP SERPL-CCNC: 60 U/L (ref 55–135)
ALT SERPL W/O P-5'-P-CCNC: 43 U/L (ref 10–44)
ANION GAP SERPL CALC-SCNC: 8 MMOL/L (ref 8–16)
ANISOCYTOSIS BLD QL SMEAR: SLIGHT
AST SERPL-CCNC: 22 U/L (ref 10–40)
BASOPHILS # BLD AUTO: ABNORMAL K/UL (ref 0–0.2)
BASOPHILS NFR BLD: 0 % (ref 0–1.9)
BILIRUB SERPL-MCNC: 0.8 MG/DL (ref 0.1–1)
BUN SERPL-MCNC: 38 MG/DL (ref 8–23)
CALCIUM SERPL-MCNC: 8.6 MG/DL (ref 8.7–10.5)
CHLORIDE SERPL-SCNC: 103 MMOL/L (ref 95–110)
CO2 SERPL-SCNC: 26 MMOL/L (ref 23–29)
CREAT SERPL-MCNC: 0.8 MG/DL (ref 0.5–1.4)
DIFFERENTIAL METHOD: ABNORMAL
EOSINOPHIL # BLD AUTO: ABNORMAL K/UL (ref 0–0.5)
EOSINOPHIL NFR BLD: 1 % (ref 0–8)
ERYTHROCYTE [DISTWIDTH] IN BLOOD BY AUTOMATED COUNT: 14.6 % (ref 11.5–14.5)
EST. GFR  (NO RACE VARIABLE): >60 ML/MIN/1.73 M^2
GLUCOSE SERPL-MCNC: 91 MG/DL (ref 70–110)
HCT VFR BLD AUTO: 39.2 % (ref 40–54)
HGB BLD-MCNC: 13.2 G/DL (ref 14–18)
IMM GRANULOCYTES # BLD AUTO: ABNORMAL K/UL (ref 0–0.04)
IMM GRANULOCYTES NFR BLD AUTO: ABNORMAL % (ref 0–0.5)
LYMPHOCYTES # BLD AUTO: ABNORMAL K/UL (ref 1–4.8)
LYMPHOCYTES NFR BLD: 62 % (ref 18–48)
MAGNESIUM SERPL-MCNC: 2.2 MG/DL (ref 1.6–2.6)
MCH RBC QN AUTO: 30.6 PG (ref 27–31)
MCHC RBC AUTO-ENTMCNC: 33.7 G/DL (ref 32–36)
MCV RBC AUTO: 91 FL (ref 82–98)
MONOCYTES # BLD AUTO: ABNORMAL K/UL (ref 0.3–1)
MONOCYTES NFR BLD: 5 % (ref 4–15)
MYELOCYTES NFR BLD MANUAL: 1 %
NEUTROPHILS NFR BLD: 32 % (ref 38–73)
NRBC BLD-RTO: 0 /100 WBC
OVALOCYTES BLD QL SMEAR: ABNORMAL
PHOSPHATE SERPL-MCNC: 2.4 MG/DL (ref 2.7–4.5)
PLATELET # BLD AUTO: 141 K/UL (ref 150–450)
PLATELET BLD QL SMEAR: ABNORMAL
PMV BLD AUTO: 11.5 FL (ref 9.2–12.9)
POIKILOCYTOSIS BLD QL SMEAR: SLIGHT
POTASSIUM SERPL-SCNC: 3.9 MMOL/L (ref 3.5–5.1)
PROT SERPL-MCNC: 5.2 G/DL (ref 6–8.4)
RBC # BLD AUTO: 4.31 M/UL (ref 4.6–6.2)
SODIUM SERPL-SCNC: 137 MMOL/L (ref 136–145)
WBC # BLD AUTO: 26.4 K/UL (ref 3.9–12.7)

## 2023-12-16 PROCEDURE — 36415 COLL VENOUS BLD VENIPUNCTURE: CPT

## 2023-12-16 PROCEDURE — 94761 N-INVAS EAR/PLS OXIMETRY MLT: CPT

## 2023-12-16 PROCEDURE — 25000003 PHARM REV CODE 250: Performed by: INTERNAL MEDICINE

## 2023-12-16 PROCEDURE — 83735 ASSAY OF MAGNESIUM: CPT

## 2023-12-16 PROCEDURE — 25000003 PHARM REV CODE 250: Performed by: NURSE PRACTITIONER

## 2023-12-16 PROCEDURE — 97530 THERAPEUTIC ACTIVITIES: CPT | Mod: CO

## 2023-12-16 PROCEDURE — 85027 COMPLETE CBC AUTOMATED: CPT

## 2023-12-16 PROCEDURE — 25000003 PHARM REV CODE 250

## 2023-12-16 PROCEDURE — 99900035 HC TECH TIME PER 15 MIN (STAT)

## 2023-12-16 PROCEDURE — 97535 SELF CARE MNGMENT TRAINING: CPT | Mod: CO

## 2023-12-16 PROCEDURE — 25000242 PHARM REV CODE 250 ALT 637 W/ HCPCS: Performed by: STUDENT IN AN ORGANIZED HEALTH CARE EDUCATION/TRAINING PROGRAM

## 2023-12-16 PROCEDURE — 85007 BL SMEAR W/DIFF WBC COUNT: CPT

## 2023-12-16 PROCEDURE — 25000003 PHARM REV CODE 250: Performed by: STUDENT IN AN ORGANIZED HEALTH CARE EDUCATION/TRAINING PROGRAM

## 2023-12-16 PROCEDURE — 94640 AIRWAY INHALATION TREATMENT: CPT

## 2023-12-16 PROCEDURE — 63600175 PHARM REV CODE 636 W HCPCS: Performed by: STUDENT IN AN ORGANIZED HEALTH CARE EDUCATION/TRAINING PROGRAM

## 2023-12-16 PROCEDURE — 25000242 PHARM REV CODE 250 ALT 637 W/ HCPCS: Performed by: INTERNAL MEDICINE

## 2023-12-16 PROCEDURE — 84100 ASSAY OF PHOSPHORUS: CPT

## 2023-12-16 PROCEDURE — 63600175 PHARM REV CODE 636 W HCPCS

## 2023-12-16 PROCEDURE — 20600001 HC STEP DOWN PRIVATE ROOM

## 2023-12-16 PROCEDURE — 27000221 HC OXYGEN, UP TO 24 HOURS

## 2023-12-16 PROCEDURE — 80053 COMPREHEN METABOLIC PANEL: CPT

## 2023-12-16 RX ADMIN — IPRATROPIUM BROMIDE 0.5 MG: 0.5 SOLUTION RESPIRATORY (INHALATION) at 01:12

## 2023-12-16 RX ADMIN — HEPARIN SODIUM 5000 UNITS: 5000 INJECTION INTRAVENOUS; SUBCUTANEOUS at 02:12

## 2023-12-16 RX ADMIN — BENZONATATE 100 MG: 100 CAPSULE ORAL at 10:12

## 2023-12-16 RX ADMIN — PREDNISONE 40 MG: 20 TABLET ORAL at 09:12

## 2023-12-16 RX ADMIN — LEVALBUTEROL 1.25 MG: 1.25 SOLUTION, CONCENTRATE RESPIRATORY (INHALATION) at 01:12

## 2023-12-16 RX ADMIN — Medication 6 MG: at 10:12

## 2023-12-16 RX ADMIN — ALPRAZOLAM 0.5 MG: 0.5 TABLET ORAL at 10:12

## 2023-12-16 RX ADMIN — PANTOPRAZOLE SODIUM 40 MG: 40 TABLET, DELAYED RELEASE ORAL at 09:12

## 2023-12-16 RX ADMIN — IPRATROPIUM BROMIDE 0.5 MG: 0.5 SOLUTION RESPIRATORY (INHALATION) at 08:12

## 2023-12-16 RX ADMIN — OXYCODONE HYDROCHLORIDE 2 MG: 5 SOLUTION ORAL at 10:12

## 2023-12-16 RX ADMIN — LEVALBUTEROL 1.25 MG: 1.25 SOLUTION, CONCENTRATE RESPIRATORY (INHALATION) at 07:12

## 2023-12-16 RX ADMIN — HEPARIN SODIUM 5000 UNITS: 5000 INJECTION INTRAVENOUS; SUBCUTANEOUS at 06:12

## 2023-12-16 RX ADMIN — IPRATROPIUM BROMIDE 0.5 MG: 0.5 SOLUTION RESPIRATORY (INHALATION) at 07:12

## 2023-12-16 RX ADMIN — NIFEDIPINE 60 MG: 30 TABLET, FILM COATED, EXTENDED RELEASE ORAL at 09:12

## 2023-12-16 RX ADMIN — TAMSULOSIN HYDROCHLORIDE 0.4 MG: 0.4 CAPSULE ORAL at 09:12

## 2023-12-16 RX ADMIN — FLUTICASONE FUROATE AND VILANTEROL TRIFENATATE 1 PUFF: 200; 25 POWDER RESPIRATORY (INHALATION) at 08:12

## 2023-12-16 RX ADMIN — HEPARIN SODIUM 5000 UNITS: 5000 INJECTION INTRAVENOUS; SUBCUTANEOUS at 10:12

## 2023-12-16 RX ADMIN — LEVALBUTEROL 1.25 MG: 1.25 SOLUTION, CONCENTRATE RESPIRATORY (INHALATION) at 08:12

## 2023-12-16 RX ADMIN — ALPRAZOLAM 0.5 MG: 0.5 TABLET ORAL at 02:12

## 2023-12-16 NOTE — PROGRESS NOTES
[facility] ([address] Medicine  Progress Note    Patient Name: Francois Otero Sr.  MRN: [id number]  Patient Class: IP- Inpatient   Admission Date: 12/5/2023  Length of Stay: 11 days  Attending Physician: Mack Cameron*  Primary Care Provider: Carley Kothari MD        Subjective:     Principal Problem:COPD exacerbation        HPI:  Francois Otero Sr. is a 78M with PMHx of CAD, hyperlipidemia, hypertension and COPD on 5 L of O2 at home presents to the ED c/o worsening shortness of breath for the last several days worse with exertion. Patient reports chronic dyspnea mostly with exertion, uses oxygen consistently with exertion and at night but does not always use it at rest. Baseline SpO2 88-92%. Reports that for the last several days he has noticed decreased exercise tolerance and worsening shortness of breath. Has prednisone at home for emergencies and reports using prednisone 40mg daily for the last 3 days without improvement in his symptoms. Has had prior COPD exacerbations in the past, and this is similar. Denies having any recent sick contacts. Denies having any other symptoms, including worse cough or fevers. Reports consistent use of his inhalers, but more as rescue inhaler and not daily maintenance inhalers. Pt is prior smoker.      ED course: Afebrile, HDS, tachycardic, requiring BiPAP 10/5 30% and satting >90%. CBC significant for WBC 23k, CMP with mild acidosis, nl renal function, covid/flu/rsv negative, BNP/trop negative, VBG 7.43/37/49/25. CXR with hyperinflated lung, emphysematous changes, no consolidation. Admit to MICU for COPD exacerbation requiring BiPAP.    Overview/Hospital Course:  Admitted to MICU for COPD exacerbation requiring BiPAP. Started on solumedrol, duonebs, and levaquin. Working to wean BiPAP and transition to Comfort Flow. Palliative Care to be consulted for additional assistance with GOC discussions.  Patient steroids tapered as symptoms  allow along with nebulizers.  PT/OT consulted given patient's increased dyspnea symptoms limiting his physical conditioning.  Patient with worsening abdominal pain overnight refractory to NG tube placement.  CT abdomen/pelvis notable for significant urinary retention.  Akhtar catheter placed with improvement of patient's abdominal pain and respiratory status with 1300 cc removed.  NG tube removed the following day.  Akhtar catheter accidentally removed and nursing with difficulty replacing.  Akhtar then placed by Urology and patient started on tamsulosin.  Outpatient urology follow-up needed. Abdominal symptoms relieved. Oxygen weaning.    Interval History: Patient feeling better. Reports walking down the haile and felt good. Weaned to 2 L NC and comfortable.     Review of Systems  Objective:     Vital Signs (Most Recent):  Temp: 97.1 °F (36.2 °C) (12/16/23 1124)  Pulse: 86 (12/16/23 1141)  Resp: 18 (12/16/23 1124)  BP: 130/60 (12/16/23 1124)  SpO2: (!) 92 % (12/16/23 1141) Vital Signs (24h Range):  Temp:  [97.1 °F (36.2 °C)-98.5 °F (36.9 °C)] 97.1 °F (36.2 °C)  Pulse:  [] 86  Resp:  [16-24] 18  SpO2:  [92 %-100 %] 92 %  BP: (127-191)/(60-81) 130/60     Weight: 52 kg (114 lb 10.2 oz)  Body mass index is 20.97 kg/m².    Intake/Output Summary (Last 24 hours) at 12/16/2023 1301  Last data filed at 12/16/2023 0745  Gross per 24 hour   Intake 560 ml   Output 900 ml   Net -340 ml         Physical Exam  Constitutional:       Appearance: Normal appearance. He is normal weight. He is ill-appearing.   HENT:      Head: Normocephalic and atraumatic.      Mouth/Throat:      Mouth: Mucous membranes are moist.   Eyes:      Extraocular Movements: Extraocular movements intact.      Conjunctiva/sclera: Conjunctivae normal.   Cardiovascular:      Rate and Rhythm: Normal rate and regular rhythm.      Heart sounds: No murmur heard.  Pulmonary:      Effort: Pulmonary effort is normal. No respiratory distress.      Breath sounds: Normal  breath sounds. No wheezing or rales.      Comments: Reduced breath sounds  Abdominal:      General: Abdomen is flat. Bowel sounds are normal. There is no distension.      Palpations: Abdomen is soft.      Tenderness: There is no abdominal tenderness. There is no guarding.   Musculoskeletal:         General: No swelling or tenderness.   Skin:     Findings: No rash.   Neurological:      General: No focal deficit present.      Mental Status: He is alert and oriented to person, place, and time. Mental status is at baseline.             Significant Labs: All pertinent labs within the past 24 hours have been reviewed.    Significant Imaging: I have reviewed all pertinent imaging results/findings within the past 24 hours.    Assessment/Plan:      * COPD exacerbation  Because the patient is experiencing an acute worsening in baseline symptoms (such as cough, dyspnea, and/or sputum production) beyond normal daily variations to an extent that requires a change in therapy, they are in an acute COPD exacerbation.  Patient is is on oxygen at 5 L/min per nasal cannula..  They are currently exhibiting the following signs of a severe exacerbation: accessory respiratory muscle use and paradoxical chest wall movement.  As such, their exacerbation is classified as severe.     - Admit to MICU due to his need for continuous BiPAP  - Duonebs q6h while awake (or TID)  - Breo qd  - Steroids tapering: solumedrol 40mg q12h to Prednisone 60md daily on 12/14  - Levaquin 750 mg daily x 5 days, eot 12/11  - Xanax prn, morphine prn for anxiety/dyspnea  - Supplemental oxygen with SpO2 goal > 88%  - Smoking cessation education  - PT/OT    Urinary retention  - CT A/P with urinary bladder distention on 12/13  - Jennings placed with 1300cc UOP  - Continue jennings  - Started tamsulosin 0.4mg daily      History of benzodiazepine use  Prescribed Xanax 0.25 mg QHS PRN for anxiety since at least 2020.   - Xanax prn    Anxiety  Managed inpatient with Xanax PRN        Palliative care encounter  Pt with chronic airway disease and frequent hospitalizations 2/t exacerbations.   - Palliative Care consulted for C discussion with pt and family.  - DNR  - weaning oxygen    Abdominal pain  - Chronic  - KUB 12/9: mild/mod gaseous distension , mild stool  - CT A/P on 12/13 with significant distension of the urinary bladder without upstream hydroureteronephrosis. Distension of the transverse colon with fecal material and air to the level of the distal descending colon.  No evidence of bowel obstruction.   - s/p jennings placement.   - Continue pantoja regimen     Hypertension  Listed on problem list, but no antihypertensives listed on home meds list.  SBP > 200 on presentation, down to 140s after 2 mg IV Ativan given.     - Losartan 25mg qd, nifedipine 30mg qd  - PRN labetalol IV for SBP > 170  - Will need close PCP follow-up after DC    COPD (chronic obstructive pulmonary disease)  See above      VTE Risk Mitigation (From admission, onward)           Ordered     heparin (porcine) injection 5,000 Units  Every 8 hours         12/09/23 1122     IP VTE HIGH RISK PATIENT  Once         12/05/23 1843     Place sequential compression device  Until discontinued         12/05/23 1843                    Discharge Planning   RONEL: 12/18/2023     Code Status: DNR   Is the patient medically ready for discharge?: Yes    Reason for patient still in hospital (select all that apply): Pending disposition  Discharge Plan A: Skilled Nursing Facility   Discharge Delays: None known at this time      Mack Cameron MD  Department of Hospital Medicine   [facility] - Intensive Care ([place]-14)

## 2023-12-16 NOTE — NURSING
Patient is alert, oriented x 4, and amb to [facility]. Patient was hypertensive at beginning of shift but patient did not want prn hydralazine. Re-checked BP and it has been decreasing and in normal range since. Vitals now stable and patient is on a nasal cannula at 2L. Patient running NSR on cardiac monitor. Prn xanax given for anxiety. No acute problems overnight. Patient in bed with call light in reach, side rails up x3, and bed alarm set.

## 2023-12-16 NOTE — PT/OT/SLP PROGRESS
Occupational Therapy   Treatment    Name: Francois Otero Sr.  MRN: [id number]  Admitting Diagnosis:  COPD exacerbation       Recommendations:     Discharge Recommendations: Moderate Intensity Therapy  Discharge Equipment Recommendations:  walker, rolling  Barriers to discharge:       Assessment:     Francois Otero Sr. is a 78 y.o. male with a medical diagnosis of COPD exacerbation.  He presents with the following performance deficits affecting function: weakness, impaired functional mobility, impaired endurance, gait instability, impaired self care skills, impaired cardiopulmonary response to activity.     Pt agreeable to therapy and tolerated session well. Pt requiring light assistance with transfers and ambulation. Pt requesting help with pericare after BM on BSC. Pt then ambulating community distance with RW and 2L O2. After a seated rest break, pt ambulating to bathroom sink with no use of AD to perform g/h tasks. Pt c/o fatigue after ambulation and SPO2 reading 88% on 2L. Pt recovering to 95%.     Rehab Prognosis:  Good; patient would benefit from acute skilled OT services to address these deficits and reach maximum level of function.       Plan:     Patient to be seen 4 x/week to address the above listed problems via self-care/home management, therapeutic activities, therapeutic exercises  Plan of Care Expires: 01/08/24  Plan of Care Reviewed with: patient, daughter    Subjective     Chief Complaint: fatigue and fear  Patient/Family Comments/goals: to return to PLOF  Pain/Comfort:  Pain Rating 1: 0/10    Objective:     Communicated with: RN prior to session.  Patient found  on BSC  with telemetry, pulse ox (continuous), jennings catheter, oxygen upon OT entry to room.  A client care conference was completed by the OTR and the RILEY prior to treatment by the RILEY to discuss the patient's POC and current status.    General Precautions: Standard, fall    Orthopedic Precautions:N/A  Braces: N/A  Respiratory Status: Nasal  cannula, flow 2 L/min     Occupational Performance:     Bed Mobility:    Not performed     Functional Mobility/Transfers:  Patient completed Sit <> Stand Transfer with supervision  with  no assistive device and rolling walker   Patient completed Toilet Transfer Step Transfer technique with stand by assistance with  no AD  Functional Mobility: pt ambulating ~160ft with SBA using RW and then ~16ft x 2 trials with SBA without RW. No LOB noted. SOB noted during longer walk but recovering with seated rest break.    Activities of Daily Living:  Grooming: supervision standing at sink with no AD for oral hygiene  Lower Body Dressing: stand by assistance to adjust socks   Toileting: maximal assistance d/t pt requesting therapist assist with pericare      AMPAC 6 Click ADL: 20    Treatment & Education:  Pt educated on OT POC and frequency during hospital stay.   Pt educated on importance of OOB activity to improve function and activity tolerance.  Pt educated on pacing and deep breathing techniques to improve energy conservation and activity tolerance.   Pt and daughter educated on activities to promote of functional independenc   Addressed all patient questions/concerns within RILEY scope of practice.     Patient left up in chair with all lines intact, call button in reach, RN notified, and daughter present    GOALS:   Multidisciplinary Problems       Occupational Therapy Goals          Problem: Occupational Therapy    Goal Priority Disciplines Outcome Interventions   Occupational Therapy Goal     OT, PT/OT Ongoing, Progressing    Description: Goals to be met by: 12/24/2023     Patient will increase functional independence with ADLs by performing:    UE Dressing with Stand-by Assistance.  LE Dressing with Stand-by Assistance.  Grooming while standing at sink with Stand-by Assistance.  Toileting from toilet with Stand-by Assistance for hygiene and clothing management.   Supine to sit with Modified [place].  Step transfer  with Stand-by Assistance with LRAD as needed.   Toilet transfer to toilet with Stand-by Assistance with LRAD as needed.                         Time Tracking:     OT Date of Treatment: 12/16/23  OT Start Time: 0952  OT Stop Time: 1037  OT Total Time (min): 45 min    Billable Minutes:Self Care/Home Management 10  Therapeutic Activity 35    OT/REGI: REGI     Number of REGI visits since last OT visit: 3    12/16/2023

## 2023-12-16 NOTE — PLAN OF CARE
Problem: Adult Inpatient Plan of Care  Goal: Plan of Care Review  Outcome: Ongoing, Progressing  Goal: Patient-Specific Goal (Individualized)  Outcome: Ongoing, Progressing  Goal: Absence of Hospital-Acquired Illness or Injury  Outcome: Ongoing, Progressing  Goal: Optimal Comfort and Wellbeing  Outcome: Ongoing, Progressing     Problem: Fluid and Electrolyte Imbalance (Acute Kidney Injury/Impairment)  Goal: Fluid and Electrolyte Balance  Outcome: Ongoing, Progressing     Problem: Oral Intake Inadequate (Acute Kidney Injury/Impairment)  Goal: Optimal Nutrition Intake  Outcome: Ongoing, Progressing     Problem: Renal Function Impairment (Acute Kidney Injury/Impairment)  Goal: Effective Renal Function  Outcome: Ongoing, Progressing     Problem: Skin Injury Risk Increased  Goal: Skin Health and Integrity  Outcome: Ongoing, Progressing     Problem: Coping Ineffective  Goal: Effective Coping  Outcome: Ongoing, Progressing     Problem: Fall Injury Risk  Goal: Absence of Fall and Fall-Related Injury  Outcome: Ongoing, Progressing     Problem: Infection  Goal: Absence of Infection Signs and Symptoms  Outcome: Ongoing, Progressing

## 2023-12-16 NOTE — RESPIRATORY THERAPY
RAPID RESPONSE RESPIRATORY CHART CHECK       Chart check completed.  Please call [phone] for further concerns or assistance.

## 2023-12-16 NOTE — SUBJECTIVE & OBJECTIVE
Interval History: Patient feeling better. Reports walking down the haile and felt good. Weaned to 2 L NC and comfortable.     Review of Systems  Objective:     Vital Signs (Most Recent):  Temp: 97.1 °F (36.2 °C) (12/16/23 1124)  Pulse: 86 (12/16/23 1141)  Resp: 18 (12/16/23 1124)  BP: 130/60 (12/16/23 1124)  SpO2: (!) 92 % (12/16/23 1141) Vital Signs (24h Range):  Temp:  [97.1 °F (36.2 °C)-98.5 °F (36.9 °C)] 97.1 °F (36.2 °C)  Pulse:  [] 86  Resp:  [16-24] 18  SpO2:  [92 %-100 %] 92 %  BP: (127-191)/(60-81) 130/60     Weight: 52 kg (114 lb 10.2 oz)  Body mass index is 20.97 kg/m².    Intake/Output Summary (Last 24 hours) at 12/16/2023 1301  Last data filed at 12/16/2023 0745  Gross per 24 hour   Intake 560 ml   Output 900 ml   Net -340 ml         Physical Exam  Constitutional:       Appearance: Normal appearance. He is normal weight. He is ill-appearing.   HENT:      Head: Normocephalic and atraumatic.      Mouth/Throat:      Mouth: Mucous membranes are moist.   Eyes:      Extraocular Movements: Extraocular movements intact.      Conjunctiva/sclera: Conjunctivae normal.   Cardiovascular:      Rate and Rhythm: Normal rate and regular rhythm.      Heart sounds: No murmur heard.  Pulmonary:      Effort: Pulmonary effort is normal. No respiratory distress.      Breath sounds: Normal breath sounds. No wheezing or rales.      Comments: Reduced breath sounds  Abdominal:      General: Abdomen is flat. Bowel sounds are normal. There is no distension.      Palpations: Abdomen is soft.      Tenderness: There is no abdominal tenderness. There is no guarding.   Musculoskeletal:         General: No swelling or tenderness.   Skin:     Findings: No rash.   Neurological:      General: No focal deficit present.      Mental Status: He is alert and oriented to person, place, and time. Mental status is at baseline.             Significant Labs: All pertinent labs within the past 24 hours have been reviewed.    Significant Imaging:  I have reviewed all pertinent imaging results/findings within the past 24 hours.

## 2023-12-16 NOTE — PLAN OF CARE
Problem: Adult Inpatient Plan of Care  Goal: Plan of Care Review  Outcome: Ongoing, Progressing  Goal: Patient-Specific Goal (Individualized)  Outcome: Ongoing, Progressing  Goal: Absence of Hospital-Acquired Illness or Injury  Outcome: Ongoing, Progressing  Intervention: Identify and Manage Fall Risk  Flowsheets (Taken 12/15/2023 2330)  Safety Promotion/Fall Prevention:   assistive device/personal item within reach   instructed to call staff for mobility  Intervention: Prevent Skin Injury  Flowsheets (Taken 12/15/2023 2330)  Body Position: position changed independently  Goal: Optimal Comfort and Wellbeing  Outcome: Ongoing, Progressing  Intervention: Monitor Pain and Promote Comfort  Flowsheets (Taken 12/15/2023 2330)  Pain Management Interventions: care clustered  Goal: Readiness for Transition of Care  Outcome: Ongoing, Progressing     Problem: Fluid and Electrolyte Imbalance (Acute Kidney Injury/Impairment)  Goal: Fluid and Electrolyte Balance  Outcome: Ongoing, Progressing  Intervention: Monitor and Manage Fluid and Electrolyte Balance  Flowsheets (Taken 12/15/2023 2330)  Fluid/Electrolyte Management: fluids provided     Problem: Oral Intake Inadequate (Acute Kidney Injury/Impairment)  Goal: Optimal Nutrition Intake  Outcome: Ongoing, Progressing  Intervention: Promote and Optimize Nutrition  Flowsheets (Taken 12/15/2023 2330)  Oral Nutrition Promotion: physical activity promoted     Problem: Renal Function Impairment (Acute Kidney Injury/Impairment)  Goal: Effective Renal Function  Outcome: Ongoing, Progressing  Intervention: Monitor and Support Renal Function  Flowsheets (Taken 12/15/2023 2330)  Medication Review/Management: medications reviewed     Problem: Skin Injury Risk Increased  Goal: Skin Health and Integrity  Outcome: Ongoing, Progressing  Intervention: Optimize Skin Protection  Flowsheets (Taken 12/15/2023 2330)  Pressure Reduction Techniques: frequent weight shift encouraged  Head of Bed (HOB)  Positioning: HOB elevated  Intervention: Promote and Optimize Oral Intake  Flowsheets (Taken 12/15/2023 2330)  Oral Nutrition Promotion: physical activity promoted     Problem: Coping Ineffective  Goal: Effective Coping  Outcome: Ongoing, Progressing  Intervention: Support and Enhance Coping Strategies  Flowsheets (Taken 12/15/2023 2330)  Supportive Measures: self-care encouraged  Environmental Support: rest periods encouraged     Problem: Fall Injury Risk  Goal: Absence of Fall and Fall-Related Injury  Outcome: Ongoing, Progressing  Intervention: Identify and Manage Contributors  Flowsheets (Taken 12/15/2023 2330)  Medication Review/Management: medications reviewed     Problem: Infection  Goal: Absence of Infection Signs and Symptoms  Outcome: Ongoing, Progressing

## 2023-12-17 LAB
ALBUMIN SERPL BCP-MCNC: 2.7 G/DL (ref 3.5–5.2)
ALP SERPL-CCNC: 64 U/L (ref 55–135)
ALT SERPL W/O P-5'-P-CCNC: 44 U/L (ref 10–44)
ANION GAP SERPL CALC-SCNC: 9 MMOL/L (ref 8–16)
ANISOCYTOSIS BLD QL SMEAR: SLIGHT
AST SERPL-CCNC: 26 U/L (ref 10–40)
BASOPHILS # BLD AUTO: 0.13 K/UL (ref 0–0.2)
BASOPHILS NFR BLD: 0.5 % (ref 0–1.9)
BILIRUB SERPL-MCNC: 0.6 MG/DL (ref 0.1–1)
BUN SERPL-MCNC: 35 MG/DL (ref 8–23)
CALCIUM SERPL-MCNC: 8.3 MG/DL (ref 8.7–10.5)
CHLORIDE SERPL-SCNC: 104 MMOL/L (ref 95–110)
CO2 SERPL-SCNC: 23 MMOL/L (ref 23–29)
CREAT SERPL-MCNC: 0.8 MG/DL (ref 0.5–1.4)
DIFFERENTIAL METHOD: ABNORMAL
EOSINOPHIL # BLD AUTO: 0.1 K/UL (ref 0–0.5)
EOSINOPHIL NFR BLD: 0.2 % (ref 0–8)
ERYTHROCYTE [DISTWIDTH] IN BLOOD BY AUTOMATED COUNT: 14.6 % (ref 11.5–14.5)
EST. GFR  (NO RACE VARIABLE): >60 ML/MIN/1.73 M^2
GLUCOSE SERPL-MCNC: 95 MG/DL (ref 70–110)
HCT VFR BLD AUTO: 36.3 % (ref 40–54)
HGB BLD-MCNC: 12.3 G/DL (ref 14–18)
IMM GRANULOCYTES # BLD AUTO: 0.55 K/UL (ref 0–0.04)
IMM GRANULOCYTES NFR BLD AUTO: 2 % (ref 0–0.5)
LYMPHOCYTES # BLD AUTO: 14.6 K/UL (ref 1–4.8)
LYMPHOCYTES NFR BLD: 51.9 % (ref 18–48)
MAGNESIUM SERPL-MCNC: 1.9 MG/DL (ref 1.6–2.6)
MCH RBC QN AUTO: 30.6 PG (ref 27–31)
MCHC RBC AUTO-ENTMCNC: 33.9 G/DL (ref 32–36)
MCV RBC AUTO: 90 FL (ref 82–98)
MONOCYTES # BLD AUTO: 1.8 K/UL (ref 0.3–1)
MONOCYTES NFR BLD: 6.4 % (ref 4–15)
NEUTROPHILS # BLD AUTO: 11 K/UL (ref 1.8–7.7)
NEUTROPHILS NFR BLD: 39 % (ref 38–73)
NRBC BLD-RTO: 0 /100 WBC
PHOSPHATE SERPL-MCNC: 2.4 MG/DL (ref 2.7–4.5)
PLATELET # BLD AUTO: 142 K/UL (ref 150–450)
PLATELET BLD QL SMEAR: ABNORMAL
PMV BLD AUTO: 11.8 FL (ref 9.2–12.9)
POTASSIUM SERPL-SCNC: 3.7 MMOL/L (ref 3.5–5.1)
PROT SERPL-MCNC: 5.1 G/DL (ref 6–8.4)
RBC # BLD AUTO: 4.02 M/UL (ref 4.6–6.2)
SODIUM SERPL-SCNC: 136 MMOL/L (ref 136–145)
WBC # BLD AUTO: 28.15 K/UL (ref 3.9–12.7)

## 2023-12-17 PROCEDURE — 83735 ASSAY OF MAGNESIUM: CPT

## 2023-12-17 PROCEDURE — 25000003 PHARM REV CODE 250

## 2023-12-17 PROCEDURE — 27000221 HC OXYGEN, UP TO 24 HOURS

## 2023-12-17 PROCEDURE — 63600175 PHARM REV CODE 636 W HCPCS: Performed by: STUDENT IN AN ORGANIZED HEALTH CARE EDUCATION/TRAINING PROGRAM

## 2023-12-17 PROCEDURE — 25000003 PHARM REV CODE 250: Performed by: INTERNAL MEDICINE

## 2023-12-17 PROCEDURE — 25000003 PHARM REV CODE 250: Performed by: STUDENT IN AN ORGANIZED HEALTH CARE EDUCATION/TRAINING PROGRAM

## 2023-12-17 PROCEDURE — 85007 BL SMEAR W/DIFF WBC COUNT: CPT

## 2023-12-17 PROCEDURE — 25000242 PHARM REV CODE 250 ALT 637 W/ HCPCS: Performed by: INTERNAL MEDICINE

## 2023-12-17 PROCEDURE — 94761 N-INVAS EAR/PLS OXIMETRY MLT: CPT

## 2023-12-17 PROCEDURE — 63600175 PHARM REV CODE 636 W HCPCS

## 2023-12-17 PROCEDURE — 94640 AIRWAY INHALATION TREATMENT: CPT

## 2023-12-17 PROCEDURE — 99900035 HC TECH TIME PER 15 MIN (STAT)

## 2023-12-17 PROCEDURE — 36415 COLL VENOUS BLD VENIPUNCTURE: CPT

## 2023-12-17 PROCEDURE — 85027 COMPLETE CBC AUTOMATED: CPT

## 2023-12-17 PROCEDURE — 20600001 HC STEP DOWN PRIVATE ROOM

## 2023-12-17 PROCEDURE — 84100 ASSAY OF PHOSPHORUS: CPT

## 2023-12-17 PROCEDURE — 25000242 PHARM REV CODE 250 ALT 637 W/ HCPCS: Performed by: STUDENT IN AN ORGANIZED HEALTH CARE EDUCATION/TRAINING PROGRAM

## 2023-12-17 PROCEDURE — 80053 COMPREHEN METABOLIC PANEL: CPT

## 2023-12-17 RX ADMIN — NIFEDIPINE 60 MG: 30 TABLET, FILM COATED, EXTENDED RELEASE ORAL at 09:12

## 2023-12-17 RX ADMIN — PREDNISONE 40 MG: 20 TABLET ORAL at 09:12

## 2023-12-17 RX ADMIN — OXYCODONE HYDROCHLORIDE 2 MG: 5 SOLUTION ORAL at 09:12

## 2023-12-17 RX ADMIN — IPRATROPIUM BROMIDE 0.5 MG: 0.5 SOLUTION RESPIRATORY (INHALATION) at 01:12

## 2023-12-17 RX ADMIN — PANTOPRAZOLE SODIUM 40 MG: 40 TABLET, DELAYED RELEASE ORAL at 09:12

## 2023-12-17 RX ADMIN — ALPRAZOLAM 0.5 MG: 0.5 TABLET ORAL at 11:12

## 2023-12-17 RX ADMIN — LEVALBUTEROL 1.25 MG: 1.25 SOLUTION, CONCENTRATE RESPIRATORY (INHALATION) at 07:12

## 2023-12-17 RX ADMIN — Medication 6 MG: at 09:12

## 2023-12-17 RX ADMIN — ALPRAZOLAM 0.5 MG: 0.5 TABLET ORAL at 09:12

## 2023-12-17 RX ADMIN — LEVALBUTEROL 1.25 MG: 1.25 SOLUTION, CONCENTRATE RESPIRATORY (INHALATION) at 09:12

## 2023-12-17 RX ADMIN — HEPARIN SODIUM 5000 UNITS: 5000 INJECTION INTRAVENOUS; SUBCUTANEOUS at 03:12

## 2023-12-17 RX ADMIN — POLYETHYLENE GLYCOL 3350 17 G: 17 POWDER, FOR SOLUTION ORAL at 09:12

## 2023-12-17 RX ADMIN — IPRATROPIUM BROMIDE 0.5 MG: 0.5 SOLUTION RESPIRATORY (INHALATION) at 09:12

## 2023-12-17 RX ADMIN — HEPARIN SODIUM 5000 UNITS: 5000 INJECTION INTRAVENOUS; SUBCUTANEOUS at 06:12

## 2023-12-17 RX ADMIN — FLUTICASONE FUROATE AND VILANTEROL TRIFENATATE 1 PUFF: 200; 25 POWDER RESPIRATORY (INHALATION) at 07:12

## 2023-12-17 RX ADMIN — LEVALBUTEROL 1.25 MG: 1.25 SOLUTION, CONCENTRATE RESPIRATORY (INHALATION) at 01:12

## 2023-12-17 RX ADMIN — IPRATROPIUM BROMIDE 0.5 MG: 0.5 SOLUTION RESPIRATORY (INHALATION) at 07:12

## 2023-12-17 RX ADMIN — HEPARIN SODIUM 5000 UNITS: 5000 INJECTION INTRAVENOUS; SUBCUTANEOUS at 09:12

## 2023-12-17 RX ADMIN — TAMSULOSIN HYDROCHLORIDE 0.4 MG: 0.4 CAPSULE ORAL at 09:12

## 2023-12-17 NOTE — PLAN OF CARE
Problem: Adult Inpatient Plan of Care  Goal: Plan of Care Review  Outcome: Ongoing, Progressing  Goal: Patient-Specific Goal (Individualized)  Outcome: Ongoing, Progressing  Goal: Absence of Hospital-Acquired Illness or Injury  Outcome: Ongoing, Progressing  Goal: Optimal Comfort and Wellbeing  Outcome: Ongoing, Progressing  Goal: Readiness for Transition of Care  Outcome: Ongoing, Progressing     Problem: Fluid and Electrolyte Imbalance (Acute Kidney Injury/Impairment)  Goal: Fluid and Electrolyte Balance  Outcome: Ongoing, Progressing     Problem: Oral Intake Inadequate (Acute Kidney Injury/Impairment)  Goal: Optimal Nutrition Intake  Outcome: Ongoing, Progressing     Problem: Renal Function Impairment (Acute Kidney Injury/Impairment)  Goal: Effective Renal Function  Outcome: Ongoing, Progressing     Problem: Skin Injury Risk Increased  Goal: Skin Health and Integrity  Outcome: Ongoing, Progressing     Problem: Coping Ineffective  Goal: Effective Coping  Outcome: Ongoing, Progressing     Problem: Fall Injury Risk  Goal: Absence of Fall and Fall-Related Injury  Outcome: Ongoing, Progressing     Problem: Infection  Goal: Absence of Infection Signs and Symptoms  Outcome: Ongoing, Progressing

## 2023-12-17 NOTE — NURSING
Patient is alert, oriented x 4, and amb to BS.Vitals stable and patient is on a nasal cannula at 1L. Patient running NSR on cardiac monitor. Prn xanax given for anxiety. No acute problems overnight. Patient in bed with call light in reach, side rails up x3, and bed alarm set.

## 2023-12-17 NOTE — SUBJECTIVE & OBJECTIVE
Interval History: Comfortable on 1 L this am    Review of Systems  Objective:     Vital Signs (Most Recent):  Temp: 97.5 °F (36.4 °C) (12/17/23 1120)  Pulse: 90 (12/17/23 1316)  Resp: 18 (12/17/23 1316)  BP: (!) 145/65 (12/17/23 1120)  SpO2: 99 % (12/17/23 1316) Vital Signs (24h Range):  Temp:  [97.5 °F (36.4 °C)-98.6 °F (37 °C)] 97.5 °F (36.4 °C)  Pulse:  [70-91] 90  Resp:  [16-21] 18  SpO2:  [92 %-99 %] 99 %  BP: (116-145)/(56-65) 145/65     Weight: 52 kg (114 lb 10.2 oz)  Body mass index is 20.97 kg/m².    Intake/Output Summary (Last 24 hours) at 12/17/2023 1429  Last data filed at 12/17/2023 0630  Gross per 24 hour   Intake 690 ml   Output 650 ml   Net 40 ml         Physical Exam  Constitutional:       Appearance: Normal appearance. He is normal weight. He is ill-appearing.   HENT:      Head: Normocephalic and atraumatic.      Mouth/Throat:      Mouth: Mucous membranes are moist.   Eyes:      Extraocular Movements: Extraocular movements intact.      Conjunctiva/sclera: Conjunctivae normal.   Cardiovascular:      Rate and Rhythm: Normal rate and regular rhythm.      Heart sounds: No murmur heard.  Pulmonary:      Effort: Pulmonary effort is normal. No respiratory distress.      Breath sounds: Normal breath sounds. No wheezing or rales.      Comments: Reduced breath sounds  Abdominal:      General: Abdomen is flat. Bowel sounds are normal. There is no distension.      Palpations: Abdomen is soft.      Tenderness: There is no abdominal tenderness. There is no guarding.   Musculoskeletal:         General: No swelling or tenderness.   Skin:     Findings: No rash.   Neurological:      General: No focal deficit present.      Mental Status: He is alert and oriented to person, place, and time. Mental status is at baseline.             Significant Labs: All pertinent labs within the past 24 hours have been reviewed.    Significant Imaging: I have reviewed all pertinent imaging results/findings within the past 24 hours.

## 2023-12-17 NOTE — PROGRESS NOTES
[facility] ([address] Medicine  Progress Note    Patient Name: Francois Otero Sr.  MRN: [id number]  Patient Class: IP- Inpatient   Admission Date: 12/5/2023  Length of Stay: 12 days  Attending Physician: Mack Cameron*  Primary Care Provider: Carley Kothari MD        Subjective:     Principal Problem:COPD exacerbation        HPI:  Francois Otero Sr. is a 78M with PMHx of CAD, hyperlipidemia, hypertension and COPD on 5 L of O2 at home presents to the ED c/o worsening shortness of breath for the last several days worse with exertion. Patient reports chronic dyspnea mostly with exertion, uses oxygen consistently with exertion and at night but does not always use it at rest. Baseline SpO2 88-92%. Reports that for the last several days he has noticed decreased exercise tolerance and worsening shortness of breath. Has prednisone at home for emergencies and reports using prednisone 40mg daily for the last 3 days without improvement in his symptoms. Has had prior COPD exacerbations in the past, and this is similar. Denies having any recent sick contacts. Denies having any other symptoms, including worse cough or fevers. Reports consistent use of his inhalers, but more as rescue inhaler and not daily maintenance inhalers. Pt is prior smoker.      ED course: Afebrile, HDS, tachycardic, requiring BiPAP 10/5 30% and satting >90%. CBC significant for WBC 23k, CMP with mild acidosis, nl renal function, covid/flu/rsv negative, BNP/trop negative, VBG 7.43/37/49/25. CXR with hyperinflated lung, emphysematous changes, no consolidation. Admit to MICU for COPD exacerbation requiring BiPAP.    Overview/Hospital Course:  Admitted to MICU for COPD exacerbation requiring BiPAP. Started on solumedrol, duonebs, and levaquin. Working to wean BiPAP and transition to Comfort Flow. Palliative Care to be consulted for additional assistance with GOC discussions.  Patient steroids tapered as symptoms  allow along with nebulizers.  PT/OT consulted given patient's increased dyspnea symptoms limiting his physical conditioning.  Patient with worsening abdominal pain overnight refractory to NG tube placement.  CT abdomen/pelvis notable for significant urinary retention.  Akhtar catheter placed with improvement of patient's abdominal pain and respiratory status with 1300 cc removed.  NG tube removed the following day.  Akhtar catheter accidentally removed and nursing with difficulty replacing.  Akhtar then placed by Urology and patient started on tamsulosin.  Outpatient urology follow-up needed. Abdominal symptoms relieved. Oxygen weaning.    Interval History: Comfortable on 1 L this am    Review of Systems  Objective:     Vital Signs (Most Recent):  Temp: 97.5 °F (36.4 °C) (12/17/23 1120)  Pulse: 90 (12/17/23 1316)  Resp: 18 (12/17/23 1316)  BP: (!) 145/65 (12/17/23 1120)  SpO2: 99 % (12/17/23 1316) Vital Signs (24h Range):  Temp:  [97.5 °F (36.4 °C)-98.6 °F (37 °C)] 97.5 °F (36.4 °C)  Pulse:  [70-91] 90  Resp:  [16-21] 18  SpO2:  [92 %-99 %] 99 %  BP: (116-145)/(56-65) 145/65     Weight: 52 kg (114 lb 10.2 oz)  Body mass index is 20.97 kg/m².    Intake/Output Summary (Last 24 hours) at 12/17/2023 1429  Last data filed at 12/17/2023 0630  Gross per 24 hour   Intake 690 ml   Output 650 ml   Net 40 ml         Physical Exam  Constitutional:       Appearance: Normal appearance. He is normal weight. He is ill-appearing.   HENT:      Head: Normocephalic and atraumatic.      Mouth/Throat:      Mouth: Mucous membranes are moist.   Eyes:      Extraocular Movements: Extraocular movements intact.      Conjunctiva/sclera: Conjunctivae normal.   Cardiovascular:      Rate and Rhythm: Normal rate and regular rhythm.      Heart sounds: No murmur heard.  Pulmonary:      Effort: Pulmonary effort is normal. No respiratory distress.      Breath sounds: Normal breath sounds. No wheezing or rales.      Comments: Reduced breath  sounds  Abdominal:      General: Abdomen is flat. Bowel sounds are normal. There is no distension.      Palpations: Abdomen is soft.      Tenderness: There is no abdominal tenderness. There is no guarding.   Musculoskeletal:         General: No swelling or tenderness.   Skin:     Findings: No rash.   Neurological:      General: No focal deficit present.      Mental Status: He is alert and oriented to person, place, and time. Mental status is at baseline.             Significant Labs: All pertinent labs within the past 24 hours have been reviewed.    Significant Imaging: I have reviewed all pertinent imaging results/findings within the past 24 hours.    Assessment/Plan:      * COPD exacerbation  Because the patient is experiencing an acute worsening in baseline symptoms (such as cough, dyspnea, and/or sputum production) beyond normal daily variations to an extent that requires a change in therapy, they are in an acute COPD exacerbation.  Patient is is on oxygen at 5 L/min per nasal cannula..  They are currently exhibiting the following signs of a severe exacerbation: accessory respiratory muscle use and paradoxical chest wall movement.  As such, their exacerbation is classified as severe.     - Admit to MICU due to his need for continuous BiPAP  - Duonebs q6h while awake (or TID)  - Breo qd  - Steroids tapering: solumedrol 40mg q12h to Prednisone 60md daily on 12/14  - Levaquin 750 mg daily x 5 days, eot 12/11  - Xanax prn, morphine prn for anxiety/dyspnea  - Supplemental oxygen with SpO2 goal > 88%  - Smoking cessation education  - PT/OT    Urinary retention  - CT A/P with urinary bladder distention on 12/13  - Jennings placed with 1300cc UOP  - Continue jennings  - Started tamsulosin 0.4mg daily      History of benzodiazepine use  Prescribed Xanax 0.25 mg QHS PRN for anxiety since at least 2020.   - Xanax prn    Anxiety  Managed inpatient with Xanax PRN       Palliative care encounter  Pt with chronic airway disease and  frequent hospitalizations 2/t exacerbations.   - Palliative Care consulted for GOC discussion with pt and family.  - DNR  - weaning oxygen    Abdominal pain  - Chronic  - KUB 12/9: mild/mod gaseous distension , mild stool  - CT A/P on 12/13 with significant distension of the urinary bladder without upstream hydroureteronephrosis. Distension of the transverse colon with fecal material and air to the level of the distal descending colon.  No evidence of bowel obstruction.   - s/p jennings placement.   - Continue pantoja regimen     Hypertension  Listed on problem list, but no antihypertensives listed on home meds list.  SBP > 200 on presentation, down to 140s after 2 mg IV Ativan given.     - Losartan 25mg qd, nifedipine 30mg qd  - PRN labetalol IV for SBP > 170  - Will need close PCP follow-up after DC    COPD (chronic obstructive pulmonary disease)  See above      VTE Risk Mitigation (From admission, onward)           Ordered     heparin (porcine) injection 5,000 Units  Every 8 hours         12/09/23 1122     IP VTE HIGH RISK PATIENT  Once         12/05/23 1843     Place sequential compression device  Until discontinued         12/05/23 1843                    Discharge Planning   RONEL: 12/18/2023     Code Status: DNR   Is the patient medically ready for discharge?: Yes    Reason for patient still in hospital (select all that apply): Pending disposition  Discharge Plan A: Skilled Nursing Facility   Discharge Delays: None known at this time      Mack Cameron MD  Department of Hospital Medicine   [facility] - Intensive Care ([place])

## 2023-12-18 LAB
ALBUMIN SERPL BCP-MCNC: 3 G/DL (ref 3.5–5.2)
ALP SERPL-CCNC: 62 U/L (ref 55–135)
ALT SERPL W/O P-5'-P-CCNC: 84 U/L (ref 10–44)
ANION GAP SERPL CALC-SCNC: 7 MMOL/L (ref 8–16)
ANISOCYTOSIS BLD QL SMEAR: SLIGHT
AST SERPL-CCNC: 45 U/L (ref 10–40)
BASOPHILS NFR BLD: 0 % (ref 0–1.9)
BILIRUB SERPL-MCNC: 0.8 MG/DL (ref 0.1–1)
BUN SERPL-MCNC: 37 MG/DL (ref 8–23)
CALCIUM SERPL-MCNC: 8.7 MG/DL (ref 8.7–10.5)
CHLORIDE SERPL-SCNC: 104 MMOL/L (ref 95–110)
CO2 SERPL-SCNC: 25 MMOL/L (ref 23–29)
CREAT SERPL-MCNC: 0.9 MG/DL (ref 0.5–1.4)
DIFFERENTIAL METHOD: ABNORMAL
EOSINOPHIL NFR BLD: 0 % (ref 0–8)
ERYTHROCYTE [DISTWIDTH] IN BLOOD BY AUTOMATED COUNT: 14.6 % (ref 11.5–14.5)
EST. GFR  (NO RACE VARIABLE): >60 ML/MIN/1.73 M^2
GLUCOSE SERPL-MCNC: 81 MG/DL (ref 70–110)
HCT VFR BLD AUTO: 36.8 % (ref 40–54)
HGB BLD-MCNC: 12.7 G/DL (ref 14–18)
IMM GRANULOCYTES # BLD AUTO: ABNORMAL K/UL (ref 0–0.04)
IMM GRANULOCYTES NFR BLD AUTO: ABNORMAL % (ref 0–0.5)
LYMPHOCYTES NFR BLD: 57 % (ref 18–48)
MAGNESIUM SERPL-MCNC: 1.9 MG/DL (ref 1.6–2.6)
MCH RBC QN AUTO: 30.2 PG (ref 27–31)
MCHC RBC AUTO-ENTMCNC: 34.5 G/DL (ref 32–36)
MCV RBC AUTO: 87 FL (ref 82–98)
METAMYELOCYTES NFR BLD MANUAL: 1 %
MONOCYTES NFR BLD: 4 % (ref 4–15)
MYELOCYTES NFR BLD MANUAL: 1 %
NEUTROPHILS NFR BLD: 36 % (ref 38–73)
NEUTS BAND NFR BLD MANUAL: 1 %
NRBC BLD-RTO: 0 /100 WBC
PHOSPHATE SERPL-MCNC: 2.7 MG/DL (ref 2.7–4.5)
PLATELET # BLD AUTO: 170 K/UL (ref 150–450)
PMV BLD AUTO: 11.2 FL (ref 9.2–12.9)
POIKILOCYTOSIS BLD QL SMEAR: SLIGHT
POLYCHROMASIA BLD QL SMEAR: ABNORMAL
POTASSIUM SERPL-SCNC: 4 MMOL/L (ref 3.5–5.1)
PROT SERPL-MCNC: 5.5 G/DL (ref 6–8.4)
RBC # BLD AUTO: 4.21 M/UL (ref 4.6–6.2)
SMUDGE CELLS BLD QL SMEAR: PRESENT
SODIUM SERPL-SCNC: 136 MMOL/L (ref 136–145)
WBC # BLD AUTO: 32.6 K/UL (ref 3.9–12.7)

## 2023-12-18 PROCEDURE — 25000003 PHARM REV CODE 250

## 2023-12-18 PROCEDURE — 83735 ASSAY OF MAGNESIUM: CPT

## 2023-12-18 PROCEDURE — 97110 THERAPEUTIC EXERCISES: CPT | Mod: CO

## 2023-12-18 PROCEDURE — 94761 N-INVAS EAR/PLS OXIMETRY MLT: CPT

## 2023-12-18 PROCEDURE — 94640 AIRWAY INHALATION TREATMENT: CPT

## 2023-12-18 PROCEDURE — 25000242 PHARM REV CODE 250 ALT 637 W/ HCPCS: Performed by: STUDENT IN AN ORGANIZED HEALTH CARE EDUCATION/TRAINING PROGRAM

## 2023-12-18 PROCEDURE — 36415 COLL VENOUS BLD VENIPUNCTURE: CPT

## 2023-12-18 PROCEDURE — 20600001 HC STEP DOWN PRIVATE ROOM

## 2023-12-18 PROCEDURE — 80053 COMPREHEN METABOLIC PANEL: CPT

## 2023-12-18 PROCEDURE — 85007 BL SMEAR W/DIFF WBC COUNT: CPT

## 2023-12-18 PROCEDURE — 25000242 PHARM REV CODE 250 ALT 637 W/ HCPCS: Performed by: INTERNAL MEDICINE

## 2023-12-18 PROCEDURE — 84100 ASSAY OF PHOSPHORUS: CPT

## 2023-12-18 PROCEDURE — 27000221 HC OXYGEN, UP TO 24 HOURS

## 2023-12-18 PROCEDURE — 85027 COMPLETE CBC AUTOMATED: CPT

## 2023-12-18 PROCEDURE — 25000003 PHARM REV CODE 250: Performed by: INTERNAL MEDICINE

## 2023-12-18 PROCEDURE — 63600175 PHARM REV CODE 636 W HCPCS

## 2023-12-18 PROCEDURE — 97535 SELF CARE MNGMENT TRAINING: CPT | Mod: CO

## 2023-12-18 PROCEDURE — 97530 THERAPEUTIC ACTIVITIES: CPT | Mod: CO

## 2023-12-18 PROCEDURE — 63600175 PHARM REV CODE 636 W HCPCS: Performed by: STUDENT IN AN ORGANIZED HEALTH CARE EDUCATION/TRAINING PROGRAM

## 2023-12-18 PROCEDURE — 25000003 PHARM REV CODE 250: Performed by: STUDENT IN AN ORGANIZED HEALTH CARE EDUCATION/TRAINING PROGRAM

## 2023-12-18 PROCEDURE — 99900035 HC TECH TIME PER 15 MIN (STAT)

## 2023-12-18 PROCEDURE — 97116 GAIT TRAINING THERAPY: CPT | Mod: CQ

## 2023-12-18 RX ADMIN — FLUTICASONE FUROATE AND VILANTEROL TRIFENATATE 1 PUFF: 200; 25 POWDER RESPIRATORY (INHALATION) at 07:12

## 2023-12-18 RX ADMIN — LEVALBUTEROL 1.25 MG: 1.25 SOLUTION, CONCENTRATE RESPIRATORY (INHALATION) at 07:12

## 2023-12-18 RX ADMIN — OXYCODONE HYDROCHLORIDE 2 MG: 5 SOLUTION ORAL at 10:12

## 2023-12-18 RX ADMIN — LEVALBUTEROL 1.25 MG: 1.25 SOLUTION, CONCENTRATE RESPIRATORY (INHALATION) at 01:12

## 2023-12-18 RX ADMIN — HEPARIN SODIUM 5000 UNITS: 5000 INJECTION INTRAVENOUS; SUBCUTANEOUS at 06:12

## 2023-12-18 RX ADMIN — POLYETHYLENE GLYCOL 3350 17 G: 17 POWDER, FOR SOLUTION ORAL at 09:12

## 2023-12-18 RX ADMIN — IPRATROPIUM BROMIDE 0.5 MG: 0.5 SOLUTION RESPIRATORY (INHALATION) at 07:12

## 2023-12-18 RX ADMIN — ALPRAZOLAM 0.5 MG: 0.5 TABLET ORAL at 10:12

## 2023-12-18 RX ADMIN — HEPARIN SODIUM 5000 UNITS: 5000 INJECTION INTRAVENOUS; SUBCUTANEOUS at 09:12

## 2023-12-18 RX ADMIN — NIFEDIPINE 60 MG: 30 TABLET, FILM COATED, EXTENDED RELEASE ORAL at 09:12

## 2023-12-18 RX ADMIN — IPRATROPIUM BROMIDE 0.5 MG: 0.5 SOLUTION RESPIRATORY (INHALATION) at 01:12

## 2023-12-18 RX ADMIN — HEPARIN SODIUM 5000 UNITS: 5000 INJECTION INTRAVENOUS; SUBCUTANEOUS at 01:12

## 2023-12-18 RX ADMIN — PREDNISONE 40 MG: 20 TABLET ORAL at 09:12

## 2023-12-18 RX ADMIN — PANTOPRAZOLE SODIUM 40 MG: 40 TABLET, DELAYED RELEASE ORAL at 09:12

## 2023-12-18 RX ADMIN — ALPRAZOLAM 0.5 MG: 0.5 TABLET ORAL at 01:12

## 2023-12-18 RX ADMIN — TAMSULOSIN HYDROCHLORIDE 0.4 MG: 0.4 CAPSULE ORAL at 09:12

## 2023-12-18 NOTE — PLAN OF CARE
12/18/23 0934   Post-Acute Status   Post-Acute Authorization Placement   Coverage [employer]- HMO   Discharge Delays None known at this time   Discharge Plan   Discharge Plan A Skilled Nursing Facility     Update on SNF placement:    1.[facility] Phone: [phone]   -Yes, willing to accept patient    2.[facility] Phone: [phone]   -Yes, willing to accept patient  Comments: We have immediate beds.  [phone]    3.[facility] Phone: [phone]  - Interested, but need more information  Comments: how much oxygen is he currently requiring ?    4.[facility] Phone: [phone]   -need more information  5.[facility] Phone: [phone]  unable to accept patient  Reason: Care Needs Exceed Current Capacity    6.[facility] Phone: [phone]  -No, unable to accept patient Reason: Care Needs Exceed Current Capacity    7.[facility] Phone: [phone]   -No, unable to accept patient  Reason: No longer able to accept admits.    8.[facility] Phone: [phone]   -No, unable to accept patient  Reason:  unable to meet needs   9.[facility] Phone: [phone]   -No, unable to accept patient  Reason: Care Needs Exceed Current Capacity    10.[facility] Phone: [phone]  - No, unable to accept patient  Reason: Care Needs Exceed Current Capacity    11.[facility] Phone: [phone]  -Referral Received    12.[facility] Phone: [phone]   - referral; Received  13.[facility] Phone: [phone]   - referral received    SW will follow.    Ema Scott LMSW  PRN - [facility]  [id number]

## 2023-12-18 NOTE — PT/OT/SLP PROGRESS
"Physical Therapy Treatment    Patient Name:  Francois Otero Sr.   MRN:  [id number]    Recommendations:     Discharge Recommendations: Moderate Intensity Therapy  Discharge Equipment Recommendations: walker, rolling  Barriers to discharge: None    Assessment:     Francois Otero Sr. is a 78 y.o. male admitted with a medical diagnosis of COPD exacerbation.  He presents with the following impairments/functional limitations: weakness, impaired endurance, impaired functional mobility, gait instability, impaired cardiopulmonary response to activity.    Pt tolerates session well as indicated by increased total ambulation distance and tolerating multiple trials of stair training. Pt making good  progress towards therapy goals. Pt will continue to benefit from skilled therapy services.    Rehab Prognosis: Good; patient would benefit from acute skilled PT services to address these deficits and reach maximum level of function.    Recent Surgery: * No surgery found *      Plan:     During this hospitalization, patient to be seen 4 x/week to address the identified rehab impairments via gait training, therapeutic activities, therapeutic exercises, neuromuscular re-education and progress toward the following goals:    Plan of Care Expires:  01/10/24    Subjective     Chief Complaint: "Let me catch my breath"-between stair trials  Patient/Family Comments/goals: Pt  Pain/Comfort:  Pain Rating 1: 0/10      Objective:   Communicated with RN (Rufus) prior to session.  Patient found sitting edge of bed with jennings catheter, telemetry, pulse ox (continuous) upon PTA entry to room.     General Precautions: Standard, fall  Orthopedic Precautions: N/A  Braces: N/A  Respiratory Status: Room air     Functional Mobility:  Bed Mobility:     Scooting: anteriorly to EOB supervision  Supine to Sit: supervision  Transfers:     Sit to Stand:  from EOB supervision with rolling walker  Gait: Pt ambulates 50 feet, 100 feet, and 100 feet SBA with RW. Standing " rest breaks required between trials. PTA managing portable Oxygen.  Stairs:  Pt ascended/descended x3 trials 4 stair(s) with No Assistive Device with bilateral handrails with Stand-by Assistance. Standing rest breaks required between trials.      AM-PAC 6 CLICK MOBILITY  Turning over in bed (including adjusting bedclothes, sheets and blankets)?: 4  Sitting down on and standing up from a chair with arms (e.g., wheelchair, bedside commode, etc.): 4  Moving from lying on back to sitting on the side of the bed?: 4  Moving to and from a bed to a chair (including a wheelchair)?: 3  Need to walk in hospital room?: 3  Climbing 3-5 steps with a railing?: 3  Basic Mobility Total Score: 21       Treatment & Education:  Patient provided with daily orientation and goals of this PT session.     Pt educated to call for assistance and to transfer with hospital staff only.  Also, pt was educated on the effects of prolonged immobility and the importance of performing OOB activity and exercises to promote healing and reduce recovery time    Patient left  on bedside commode  with  RN notified and daughter present.    GOALS:   Multidisciplinary Problems       Physical Therapy Goals          Problem: Physical Therapy    Goal Priority Disciplines Outcome Goal Variances Interventions   Physical Therapy Goal     PT, PT/OT Ongoing, Progressing     Description: Goals to be met by: 23     Patient will increase functional independence with mobility by performin. Supine to sit with [place]  2. Sit to supine with [place]  3. Sit to stand transfer with Supervision  4. Bed to chair transfer with Supervision using No Assistive Device  5. Gait  x 150 feet with Supervision using No Assistive Device.   6. Ascend/descend 4 stair with unilateral handrails Supervision using No Assistive Device.                          Time Tracking:     PT Received On: 23  PT Start Time: 1006     PT Stop Time: 1022  PT Total Time (min): 16  min     Billable Minutes: Gait Training 16    Treatment Type: Treatment  PT/PTA: PTA     Number of PTA visits since last PT visit: 4     12/18/2023

## 2023-12-18 NOTE — SUBJECTIVE & OBJECTIVE
Interval History: Patient feels well. States has been walking a lot and feeling good. On 1 L NC at rest    Review of Systems  Objective:     Vital Signs (Most Recent):  Temp: 97.4 °F (36.3 °C) (12/18/23 0912)  Pulse: 79 (12/18/23 1320)  Resp: 18 (12/18/23 1320)  BP: (!) 125/59 (12/18/23 1150)  SpO2: (!) 94 % (12/18/23 1320) Vital Signs (24h Range):  Temp:  [97.4 °F (36.3 °C)-97.9 °F (36.6 °C)] 97.4 °F (36.3 °C)  Pulse:  [] 79  Resp:  [15-21] 18  SpO2:  [93 %-100 %] 94 %  BP: (125-161)/(59-69) 125/59     Weight: 52 kg (114 lb 10.2 oz)  Body mass index is 20.97 kg/m².    Intake/Output Summary (Last 24 hours) at 12/18/2023 1458  Last data filed at 12/18/2023 1150  Gross per 24 hour   Intake 540 ml   Output 1250 ml   Net -710 ml         Physical Exam  Constitutional:       Appearance: Normal appearance. He is normal weight.   HENT:      Head: Normocephalic and atraumatic.      Mouth/Throat:      Mouth: Mucous membranes are moist.   Eyes:      Extraocular Movements: Extraocular movements intact.      Conjunctiva/sclera: Conjunctivae normal.   Cardiovascular:      Rate and Rhythm: Normal rate and regular rhythm.      Heart sounds: No murmur heard.  Pulmonary:      Effort: Pulmonary effort is normal. No respiratory distress.      Breath sounds: Normal breath sounds. No wheezing or rales.      Comments: Reduced breath sounds  Abdominal:      General: Abdomen is flat. Bowel sounds are normal. There is no distension.      Palpations: Abdomen is soft.      Tenderness: There is no abdominal tenderness. There is no guarding.   Musculoskeletal:         General: No swelling or tenderness.   Skin:     Findings: No rash.   Neurological:      General: No focal deficit present.      Mental Status: He is alert and oriented to person, place, and time. Mental status is at baseline.             Significant Labs: All pertinent labs within the past 24 hours have been reviewed.    Significant Imaging: I have reviewed all pertinent  imaging results/findings within the past 24 hours.

## 2023-12-18 NOTE — PROGRESS NOTES
[facility] - Int[facility] Care ([address] Medicine  Progress Note    Patient Name: Francois Otero Sr.  MRN: [id number]  Patient Class: IP- Inpatient   Admission Date: 12/5/2023  Length of Stay: 13 days  Attending Physician: Mack Cameron*  Primary Care Provider: Carley Kothari MD        Subjective:     Principal Problem:COPD exacerbation        HPI:  Francois Otero Sr. is a 78M with PMHx of CAD, hyperlipidemia, hypertension and COPD on 5 L of O2 at home presents to the ED c/o worsening shortness of breath for the last several days worse with exertion. Patient reports chronic dyspnea mostly with exertion, uses oxygen consistently with exertion and at night but does not always use it at rest. Baseline SpO2 88-92%. Reports that for the last several days he has noticed decreased exercise tolerance and worsening shortness of breath. Has prednisone at home for emergencies and reports using prednisone 40mg daily for the last 3 days without improvement in his symptoms. Has had prior COPD exacerbations in the past, and this is similar. Denies having any recent sick contacts. Denies having any other symptoms, including worse cough or fevers. Reports consistent use of his inhalers, but more as rescue inhaler and not daily maintenance inhalers. Pt is prior smoker.      ED course: Afebrile, HDS, tachycardic, requiring BiPAP 10/5 30% and satting >90%. CBC significant for WBC 23k, CMP with mild acidosis, nl renal function, covid/flu/rsv negative, BNP/trop negative, VBG 7.43/37/49/25. CXR with hyperinflated lung, emphysematous changes, no consolidation. Admit to MICU for COPD exacerbation requiring BiPAP.    Overview/Hospital Course:  Admitted to MICU for COPD exacerbation requiring BiPAP. Started on solumedrol, duonebs, and levaquin. Working to wean BiPAP and transition to Comfort Flow. Palliative Care to be consulted for additional assistance with GOC discussions.  Patient steroids tapered as symptoms  allow along with nebulizers.  PT/OT consulted given patient's increased dyspnea symptoms limiting his physical conditioning.  Patient with worsening abdominal pain overnight refractory to NG tube placement.  CT abdomen/pelvis notable for significant urinary retention.  Akhtar catheter placed with improvement of patient's abdominal pain and respiratory status with 1300 cc removed.  NG tube removed the following day.  Akhtar catheter accidentally removed and nursing with difficulty replacing.  Akhtar then placed by Urology and patient started on tamsulosin.  Outpatient urology follow-up needed. Abdominal symptoms relieved. Oxygen weaning.    Interval History: Patient feels well. States has been walking a lot and feeling good. On 1 L NC at rest    Review of Systems  Objective:     Vital Signs (Most Recent):  Temp: 97.4 °F (36.3 °C) (12/18/23 0912)  Pulse: 79 (12/18/23 1320)  Resp: 18 (12/18/23 1320)  BP: (!) 125/59 (12/18/23 1150)  SpO2: (!) 94 % (12/18/23 1320) Vital Signs (24h Range):  Temp:  [97.4 °F (36.3 °C)-97.9 °F (36.6 °C)] 97.4 °F (36.3 °C)  Pulse:  [] 79  Resp:  [15-21] 18  SpO2:  [93 %-100 %] 94 %  BP: (125-161)/(59-69) 125/59     Weight: 52 kg (114 lb 10.2 oz)  Body mass index is 20.97 kg/m².    Intake/Output Summary (Last 24 hours) at 12/18/2023 1458  Last data filed at 12/18/2023 1150  Gross per 24 hour   Intake 540 ml   Output 1250 ml   Net -710 ml         Physical Exam  Constitutional:       Appearance: Normal appearance. He is normal weight.   HENT:      Head: Normocephalic and atraumatic.      Mouth/Throat:      Mouth: Mucous membranes are moist.   Eyes:      Extraocular Movements: Extraocular movements intact.      Conjunctiva/sclera: Conjunctivae normal.   Cardiovascular:      Rate and Rhythm: Normal rate and regular rhythm.      Heart sounds: No murmur heard.  Pulmonary:      Effort: Pulmonary effort is normal. No respiratory distress.      Breath sounds: Normal breath sounds. No wheezing or  rales.      Comments: Reduced breath sounds  Abdominal:      General: Abdomen is flat. Bowel sounds are normal. There is no distension.      Palpations: Abdomen is soft.      Tenderness: There is no abdominal tenderness. There is no guarding.   Musculoskeletal:         General: No swelling or tenderness.   Skin:     Findings: No rash.   Neurological:      General: No focal deficit present.      Mental Status: He is alert and oriented to person, place, and time. Mental status is at baseline.             Significant Labs: All pertinent labs within the past 24 hours have been reviewed.    Significant Imaging: I have reviewed all pertinent imaging results/findings within the past 24 hours.    Assessment/Plan:      * COPD exacerbation  Because the patient is experiencing an acute worsening in baseline symptoms (such as cough, dyspnea, and/or sputum production) beyond normal daily variations to an extent that requires a change in therapy, they are in an acute COPD exacerbation.  Patient is is on oxygen at 5 L/min per nasal cannula..  They are currently exhibiting the following signs of a severe exacerbation: accessory respiratory muscle use and paradoxical chest wall movement.  As such, their exacerbation is classified as severe.     - Admit to MICU due to his need for continuous BiPAP  - Duonebs q6h while awake (or TID)  - Breo qd  - Steroids tapering: solumedrol 40mg q12h to Prednisone 60md daily on 12/14  - Levaquin 750 mg daily x 5 days, eot 12/11  - Xanax prn, morphine prn for anxiety/dyspnea  - Supplemental oxygen with SpO2 goal > 88%  - Smoking cessation education  - PT/OT    Urinary retention  - CT A/P with urinary bladder distention on 12/13  - Jennings placed with 1300cc UOP  - Continue jennings  - Started tamsulosin 0.4mg daily      History of benzodiazepine use  Prescribed Xanax 0.25 mg QHS PRN for anxiety since at least 2020.   - Xanax prn    Anxiety  Managed inpatient with Xanax PRN       Palliative care  encounter  Pt with chronic airway disease and frequent hospitalizations 2/t exacerbations.   - Palliative Care consulted for GOC discussion with pt and family.  - DNR  - weaning oxygen    Abdominal pain  - Chronic  - KUB 12/9: mild/mod gaseous distension , mild stool  - CT A/P on 12/13 with significant distension of the urinary bladder without upstream hydroureteronephrosis. Distension of the transverse colon with fecal material and air to the level of the distal descending colon.  No evidence of bowel obstruction.   - s/p jennings placement.   - Continue pantoja regimen     Hypertension  Listed on problem list, but no antihypertensives listed on home meds list.  SBP > 200 on presentation, down to 140s after 2 mg IV Ativan given.     - Losartan 25mg qd, nifedipine 30mg qd  - PRN labetalol IV for SBP > 170  - Will need close PCP follow-up after DC    COPD (chronic obstructive pulmonary disease)  See above      VTE Risk Mitigation (From admission, onward)           Ordered     heparin (porcine) injection 5,000 Units  Every 8 hours         12/09/23 1122     IP VTE HIGH RISK PATIENT  Once         12/05/23 1843     Place sequential compression device  Until discontinued         12/05/23 1843                    Discharge Planning   RONEL: 12/18/2023     Code Status: DNR   Is the patient medically ready for discharge?: Yes    Reason for patient still in hospital (select all that apply): Pending disposition    Discharge Plan A: Skilled Nursing Facility   Discharge Delays: None known at this time      Mack Cameron MD  Department of Hospital Medicine   [facility] - Intensive Care ([place])

## 2023-12-18 NOTE — PT/OT/SLP PROGRESS
Occupational Therapy   Treatment    Name: Francois Otero Sr.  MRN: [id number]  Admitting Diagnosis:  COPD exacerbation       Recommendations:     Discharge Recommendations: Moderate Intensity Therapy  Discharge Equipment Recommendations:  walker, rolling  Barriers to discharge:       Assessment:     Francois Otero Sr. is a 78 y.o. male with a medical diagnosis of COPD exacerbation.  He presents with the following performance deficits affecting function: weakness, impaired endurance, impaired functional mobility, gait instability, impaired cardiopulmonary response to activity.     Pt agreeable to therapy and tolerated session well. Pt requiring light assistance with ambulation today. Pt found ambulating independently in room without AD. He then ambulated community distances with rest breaks, no O2 supplied. Pt returned to EOB to perform LBD tasks and exercises. Pt showing improvements in endurance and strength. He is motivated to go home.     Rehab Prognosis:  Good; patient would benefit from acute skilled OT services to address these deficits and reach maximum level of function.       Plan:     Patient to be seen 4 x/week to address the above listed problems via self-care/home management, therapeutic activities, therapeutic exercises  Plan of Care Expires: 01/08/24  Plan of Care Reviewed with: patient, daughter    Subjective     Chief Complaint: minor SOB  Patient/Family Comments/goals: to return to firework stand  Pain/Comfort:  Pain Rating 1: 0/10  Pain Rating Post-Intervention 1: 0/10    Objective:     Communicated with: RN prior to session.  Patient found ambulatory in room/haile with jennings catheter upon OT entry to room.    General Precautions: Standard, fall    Orthopedic Precautions:N/A  Braces: N/A  Respiratory Status: Room air     Occupational Performance:     Bed Mobility:    Not performed     Functional Mobility/Transfers:  Patient completed Sit <> Stand Transfer with independence  with  no assistive device    Functional Mobility: pt ambulated ~36ft + 86ft +122ft with SBA using RW for comfort. Standing rest breaks between distances, no O2 supplied during ambulation. No LOB and mild SOB noted.    Activities of Daily Living:  Lower Body Dressing: supervision to don/adjust socks      [facility] 6 Click ADL: 22    Treatment & Education:  Pt educated on OT POC and frequency during hospital stay.   Pt educated on importance of OOB activity to improve function and activity tolerance.  Pt performing BUE exercises to maintain/improve strength and ROM.   Pt and daughter educated on pacing techniques to improve activity tolerance.   Pt reconnecting his own lines sitting EOB.  Addressed all patient questions/concerns within [facility] scope of practice.     Patient left sitting edge of bed with all lines intact, call button in reach, RN notified, and daughter present    GOALS:   Multidisciplinary Problems       Occupational Therapy Goals          Problem: Occupational Therapy    Goal Priority Disciplines Outcome Interventions   Occupational Therapy Goal     OT, PT/OT Ongoing, Progressing    Description: Goals to be met by: 12/24/2023     Patient will increase functional independence with ADLs by performing:    UE Dressing with Stand-by Assistance.  LE Dressing with Stand-by Assistance.  Grooming while standing at sink with Stand-by Assistance.  Toileting from toilet with Stand-by Assistance for hygiene and clothing management.   Supine to sit with Modified [place].  Step transfer with Stand-by Assistance with LRAD as needed.   Toilet transfer to toilet with Stand-by Assistance with LRAD as needed.                         Time Tracking:     OT Date of Treatment: 12/18/23  OT Start Time: 0852  OT Stop Time: 0915  OT Total Time (min): 23 min    Billable Minutes:Therapeutic Activity 15  Therapeutic Exercise 8    OT/REGI: REGI     Number of REGI visits since last OT visit: 4    12/18/2023

## 2023-12-18 NOTE — NURSING
Patient with no new complaints today, tolerated physical therapy well.  O2 sats 94% on 1L nasal cannula.   Daughter at the bedside, safety precautions in place.

## 2023-12-18 NOTE — NURSING
Patient is alert, oriented x 4, and ambulated in the haile and in the room.Vitals stable and patient is on a nasal cannula at 1L. Patient  NSR on cardiac monitor. Prn xanax given for anxiety. No acute changes overnight. Patient in bed with call light in reach, side rails up x3, and bed alarm set.

## 2023-12-18 NOTE — NURSING
Patient is AAO*4.  Had Bowel and Bladder movement . Patient  was anxious , PRN meds Xanax  given . Patient  walked in the haile with oxygen and one  person assistance . Call light within reach ,safety precaution maintained . Will continue monitoring.

## 2023-12-18 NOTE — PLAN OF CARE
12/18/23 1057   Post-Acute Status   Post-Acute Authorization Placement   Coverage [facility]   Discharge Plan   Discharge Plan A Skilled Nursing Facility     SW spoke with pt/daughter concerning SNF placement. [facility] [phone] . SW also spoke with admission at [facility] Phone: [phone] concerning their interest in the facility and SW sent updated clinicals to the facility via [employer] for review. Patient is pending auth from [facility] and family completing the paper work.. SW will continue to follow patient.    2:17 PM   SW spoke with pt's daughter and she reported she completed the paper work required by the facility. SW contacted the admission dept and told  they'll review  and get back with SW. Will follow.      Ema Scott LMSW  PRN - [facility]  [phone]

## 2023-12-19 LAB
ALBUMIN SERPL BCP-MCNC: 2.7 G/DL (ref 3.5–5.2)
ALP SERPL-CCNC: 58 U/L (ref 55–135)
ALT SERPL W/O P-5'-P-CCNC: 79 U/L (ref 10–44)
ANION GAP SERPL CALC-SCNC: 6 MMOL/L (ref 8–16)
AST SERPL-CCNC: 34 U/L (ref 10–40)
BASOPHILS NFR BLD: 0 % (ref 0–1.9)
BILIRUB SERPL-MCNC: 0.4 MG/DL (ref 0.1–1)
BUN SERPL-MCNC: 33 MG/DL (ref 8–23)
CALCIUM SERPL-MCNC: 8.5 MG/DL (ref 8.7–10.5)
CHLORIDE SERPL-SCNC: 109 MMOL/L (ref 95–110)
CO2 SERPL-SCNC: 24 MMOL/L (ref 23–29)
CREAT SERPL-MCNC: 0.8 MG/DL (ref 0.5–1.4)
DIFFERENTIAL METHOD: ABNORMAL
EOSINOPHIL NFR BLD: 0 % (ref 0–8)
ERYTHROCYTE [DISTWIDTH] IN BLOOD BY AUTOMATED COUNT: 14.9 % (ref 11.5–14.5)
EST. GFR  (NO RACE VARIABLE): >60 ML/MIN/1.73 M^2
GLUCOSE SERPL-MCNC: 92 MG/DL (ref 70–110)
HCT VFR BLD AUTO: 33.8 % (ref 40–54)
HGB BLD-MCNC: 11.5 G/DL (ref 14–18)
IMM GRANULOCYTES # BLD AUTO: ABNORMAL K/UL (ref 0–0.04)
IMM GRANULOCYTES NFR BLD AUTO: ABNORMAL % (ref 0–0.5)
LYMPHOCYTES NFR BLD: 48 % (ref 18–48)
MAGNESIUM SERPL-MCNC: 1.8 MG/DL (ref 1.6–2.6)
MCH RBC QN AUTO: 29.7 PG (ref 27–31)
MCHC RBC AUTO-ENTMCNC: 34 G/DL (ref 32–36)
MCV RBC AUTO: 87 FL (ref 82–98)
MONOCYTES NFR BLD: 3 % (ref 4–15)
NEUTROPHILS NFR BLD: 48 % (ref 38–73)
NEUTS BAND NFR BLD MANUAL: 1 %
NRBC BLD-RTO: 0 /100 WBC
PHOSPHATE SERPL-MCNC: 2.7 MG/DL (ref 2.7–4.5)
PLATELET # BLD AUTO: 164 K/UL (ref 150–450)
PLATELET BLD QL SMEAR: ABNORMAL
PMV BLD AUTO: 11.4 FL (ref 9.2–12.9)
POTASSIUM SERPL-SCNC: 3.4 MMOL/L (ref 3.5–5.1)
PROT SERPL-MCNC: 5 G/DL (ref 6–8.4)
RBC # BLD AUTO: 3.87 M/UL (ref 4.6–6.2)
SODIUM SERPL-SCNC: 139 MMOL/L (ref 136–145)
WBC # BLD AUTO: 27.9 K/UL (ref 3.9–12.7)

## 2023-12-19 PROCEDURE — 80053 COMPREHEN METABOLIC PANEL: CPT

## 2023-12-19 PROCEDURE — 25000003 PHARM REV CODE 250: Performed by: STUDENT IN AN ORGANIZED HEALTH CARE EDUCATION/TRAINING PROGRAM

## 2023-12-19 PROCEDURE — 25000242 PHARM REV CODE 250 ALT 637 W/ HCPCS: Performed by: STUDENT IN AN ORGANIZED HEALTH CARE EDUCATION/TRAINING PROGRAM

## 2023-12-19 PROCEDURE — 25000242 PHARM REV CODE 250 ALT 637 W/ HCPCS: Performed by: INTERNAL MEDICINE

## 2023-12-19 PROCEDURE — 99900035 HC TECH TIME PER 15 MIN (STAT)

## 2023-12-19 PROCEDURE — 27000221 HC OXYGEN, UP TO 24 HOURS

## 2023-12-19 PROCEDURE — 85027 COMPLETE CBC AUTOMATED: CPT

## 2023-12-19 PROCEDURE — 63600175 PHARM REV CODE 636 W HCPCS

## 2023-12-19 PROCEDURE — 94640 AIRWAY INHALATION TREATMENT: CPT

## 2023-12-19 PROCEDURE — 84100 ASSAY OF PHOSPHORUS: CPT

## 2023-12-19 PROCEDURE — 25000003 PHARM REV CODE 250: Performed by: INTERNAL MEDICINE

## 2023-12-19 PROCEDURE — 83735 ASSAY OF MAGNESIUM: CPT

## 2023-12-19 PROCEDURE — 36415 COLL VENOUS BLD VENIPUNCTURE: CPT

## 2023-12-19 PROCEDURE — 25000003 PHARM REV CODE 250

## 2023-12-19 PROCEDURE — 20600001 HC STEP DOWN PRIVATE ROOM

## 2023-12-19 PROCEDURE — 94761 N-INVAS EAR/PLS OXIMETRY MLT: CPT

## 2023-12-19 PROCEDURE — 85007 BL SMEAR W/DIFF WBC COUNT: CPT

## 2023-12-19 PROCEDURE — 97110 THERAPEUTIC EXERCISES: CPT

## 2023-12-19 RX ORDER — ALPRAZOLAM 0.5 MG/1
0.5 TABLET ORAL NIGHTLY PRN
Qty: 7 TABLET | Refills: 0 | Status: SHIPPED | OUTPATIENT
Start: 2023-12-19 | End: 2023-12-20

## 2023-12-19 RX ORDER — NIFEDIPINE 60 MG/1
60 TABLET, EXTENDED RELEASE ORAL DAILY
Qty: 30 TABLET | Refills: 11
Start: 2023-12-20 | End: 2023-12-20

## 2023-12-19 RX ORDER — BUTALBITAL, ACETAMINOPHEN AND CAFFEINE 50; 325; 40 MG/1; MG/1; MG/1
1 TABLET ORAL EVERY 4 HOURS PRN
Qty: 20 TABLET | Refills: 0 | Status: SHIPPED | OUTPATIENT
Start: 2023-12-19

## 2023-12-19 RX ORDER — TAMSULOSIN HYDROCHLORIDE 0.4 MG/1
0.4 CAPSULE ORAL DAILY
Qty: 30 CAPSULE | Refills: 11 | Status: SHIPPED | OUTPATIENT
Start: 2023-12-20 | End: 2023-12-20

## 2023-12-19 RX ORDER — POLYETHYLENE GLYCOL 3350 17 G/17G
17 POWDER, FOR SOLUTION ORAL DAILY
Refills: 0
Start: 2023-12-20 | End: 2023-12-20

## 2023-12-19 RX ORDER — ALPRAZOLAM 0.5 MG/1
0.5 TABLET ORAL NIGHTLY PRN
Start: 2023-12-19 | End: 2023-12-19

## 2023-12-19 RX ORDER — BUTALBITAL, ACETAMINOPHEN AND CAFFEINE 50; 325; 40 MG/1; MG/1; MG/1
1 TABLET ORAL EVERY 4 HOURS PRN
Start: 2023-12-19 | End: 2023-12-19

## 2023-12-19 RX ADMIN — POTASSIUM BICARBONATE 40 MEQ: 391 TABLET, EFFERVESCENT ORAL at 09:12

## 2023-12-19 RX ADMIN — IPRATROPIUM BROMIDE 0.5 MG: 0.5 SOLUTION RESPIRATORY (INHALATION) at 07:12

## 2023-12-19 RX ADMIN — IPRATROPIUM BROMIDE 0.5 MG: 0.5 SOLUTION RESPIRATORY (INHALATION) at 08:12

## 2023-12-19 RX ADMIN — HEPARIN SODIUM 5000 UNITS: 5000 INJECTION INTRAVENOUS; SUBCUTANEOUS at 06:12

## 2023-12-19 RX ADMIN — FLUTICASONE FUROATE AND VILANTEROL TRIFENATATE 1 PUFF: 200; 25 POWDER RESPIRATORY (INHALATION) at 08:12

## 2023-12-19 RX ADMIN — TAMSULOSIN HYDROCHLORIDE 0.4 MG: 0.4 CAPSULE ORAL at 09:12

## 2023-12-19 RX ADMIN — PANTOPRAZOLE SODIUM 40 MG: 40 TABLET, DELAYED RELEASE ORAL at 09:12

## 2023-12-19 RX ADMIN — IPRATROPIUM BROMIDE 0.5 MG: 0.5 SOLUTION RESPIRATORY (INHALATION) at 01:12

## 2023-12-19 RX ADMIN — LEVALBUTEROL 1.25 MG: 1.25 SOLUTION, CONCENTRATE RESPIRATORY (INHALATION) at 08:12

## 2023-12-19 RX ADMIN — LEVALBUTEROL 1.25 MG: 1.25 SOLUTION, CONCENTRATE RESPIRATORY (INHALATION) at 07:12

## 2023-12-19 RX ADMIN — OXYCODONE HYDROCHLORIDE 2 MG: 5 SOLUTION ORAL at 09:12

## 2023-12-19 RX ADMIN — ALPRAZOLAM 0.5 MG: 0.5 TABLET ORAL at 08:12

## 2023-12-19 RX ADMIN — HEPARIN SODIUM 5000 UNITS: 5000 INJECTION INTRAVENOUS; SUBCUTANEOUS at 09:12

## 2023-12-19 RX ADMIN — LEVALBUTEROL 1.25 MG: 1.25 SOLUTION, CONCENTRATE RESPIRATORY (INHALATION) at 01:12

## 2023-12-19 RX ADMIN — NIFEDIPINE 60 MG: 30 TABLET, FILM COATED, EXTENDED RELEASE ORAL at 09:12

## 2023-12-19 NOTE — PLAN OF CARE
Problem: Adult Inpatient Plan of Care  Goal: Plan of Care Review  Outcome: Ongoing, Progressing  Goal: Patient-Specific Goal (Individualized)  Outcome: Ongoing, Progressing  Goal: Absence of Hospital-Acquired Illness or Injury  Outcome: Ongoing, Progressing  Goal: Optimal Comfort and Wellbeing  Outcome: Ongoing, Progressing  Goal: Readiness for Transition of Care  Outcome: Ongoing, Progressing     Problem: Fluid and Electrolyte Imbalance (Acute Kidney Injury/Impairment)  Goal: Fluid and Electrolyte Balance  Outcome: Ongoing, Progressing     Problem: Oral Intake Inadequate (Acute Kidney Injury/Impairment)  Goal: Optimal Nutrition Intake  Outcome: Ongoing, Progressing     Problem: Renal Function Impairment (Acute Kidney Injury/Impairment)  Goal: Effective Renal Function  Outcome: Ongoing, Progressing     Problem: Skin Injury Risk Increased  Goal: Skin Health and Integrity  Outcome: Ongoing, Progressing     Problem: Coping Ineffective  Goal: Effective Coping  Outcome: Ongoing, Progressing     Problem: Fall Injury Risk  Goal: Absence of Fall and Fall-Related Injury  Outcome: Ongoing, Progressing     Problem: Infection  Goal: Absence of Infection Signs and Symptoms  Outcome: Ongoing, Progressing  Pt Aox4, VSS stable, 1L N/C. Pt denies any current pain. PRNs given see MAR. Call light within reach, safety measures in place, rounded per facility policy.

## 2023-12-19 NOTE — PLAN OF CARE
Pt's SNF auth through [facility] is pending.  Attempted to update [facility], no answer in admissions at this time.  Will continue to follow for d/c needs.    12:31pm Update:  NH orders sent to [facility] via [employer] for review.    2:31pm Update:  Pt's auth was initiated by [facility].  They received communication that pt has been offered a P2P.  Attempted to schedule on Dr. Cameron's behalf, no answer at this time to P2P scheduling line [phone], left message.  P2P must be scheduled by 10am on 12/20.  Family and MD updated, will continue to follow.    3:30pm Update: P2P scheduled for 11am 12/20.  MD informed via secure chat.    Valentin Tobin RN   Case Management  [phone]

## 2023-12-19 NOTE — NURSING
Patient with no new complaints today, ambulating in the hallway with family, O2 sats 94% on 1L nasal cannula, jennings still in place.  Safety precations in place.

## 2023-12-19 NOTE — PT/OT/SLP PROGRESS
Physical Therapy      Patient Name:  Francois Otero .   MRN:  [id number]    Attempt at 12:59    Patient not seen today secondary to Pt with visitor at this time. PTA unable to make second attempt.    Will follow-up 12/20/23.

## 2023-12-19 NOTE — SUBJECTIVE & OBJECTIVE
Interval History: Awaiting insurance approval. Medically ready. Patient feels well and has no complaints    Review of Systems  Objective:     Vital Signs (Most Recent):  Temp: 98.1 °F (36.7 °C) (12/19/23 0800)  Pulse: 95 (12/19/23 1512)  Resp: 17 (12/19/23 1347)  BP: 139/71 (12/19/23 0800)  SpO2: 95 % (12/19/23 1512) Vital Signs (24h Range):  Temp:  [97.6 °F (36.4 °C)-98.1 °F (36.7 °C)] 98.1 °F (36.7 °C)  Pulse:  [71-95] 95  Resp:  [17-22] 17  SpO2:  [94 %-97 %] 95 %  BP: (125-161)/(60-71) 139/71     Weight: 52 kg (114 lb 10.2 oz)  Body mass index is 20.97 kg/m².    Intake/Output Summary (Last 24 hours) at 12/19/2023 1605  Last data filed at 12/19/2023 0500  Gross per 24 hour   Intake --   Output 700 ml   Net -700 ml         Physical Exam  Constitutional:       Appearance: Normal appearance. He is normal weight.   HENT:      Head: Normocephalic and atraumatic.      Mouth/Throat:      Mouth: Mucous membranes are moist.   Eyes:      Extraocular Movements: Extraocular movements intact.      Conjunctiva/sclera: Conjunctivae normal.   Cardiovascular:      Rate and Rhythm: Normal rate and regular rhythm.      Heart sounds: No murmur heard.  Pulmonary:      Effort: Pulmonary effort is normal. No respiratory distress.      Breath sounds: Normal breath sounds. No wheezing or rales.      Comments: Reduced breath sounds  Abdominal:      General: Abdomen is flat. Bowel sounds are normal. There is no distension.      Palpations: Abdomen is soft.      Tenderness: There is no abdominal tenderness. There is no guarding.   Musculoskeletal:         General: No swelling or tenderness.   Skin:     Findings: No rash.   Neurological:      General: No focal deficit present.      Mental Status: He is alert and oriented to person, place, and time. Mental status is at baseline.             Significant Labs: All pertinent labs within the past 24 hours have been reviewed.    Significant Imaging: I have reviewed all pertinent imaging  results/findings within the past 24 hours.

## 2023-12-19 NOTE — PROGRESS NOTES
RD triggered for LOS x 14 days - scheduled to discharge today.   Pt working w/ PT this AM during visit; tolerating diet + ONS w/ 50-75% PO intake, per RN documentation.   Per chart review, pt w/ stable weight of 110# x 1 year.    RD to follow-up w/ full assessment should pt not be discharged.    Thanks!  Lizy MS, RD, LDN

## 2023-12-19 NOTE — PT/OT/SLP PROGRESS
Occupational Therapy   Treatment    Name: Francois Otero Sr.  MRN: [id number]  Admitting Diagnosis:  COPD exacerbation       Recommendations:     Discharge Recommendations: Low Intensity Therapy  Discharge Equipment Recommendations:  walker, rolling  Barriers to discharge:  Decreased caregiver support    Assessment:     Francois Otero Sr. is a 78 y.o. male with a medical diagnosis of COPD exacerbation.  He presents with performance deficits affecting function are impaired endurance, impaired functional mobility, impaired cardiopulmonary response to activity, weakness.   Pt tolerated session well, agreeable to participate in therex with OT, declined ADLs and ambulation 2/2 already walking around the hallways this morning, RN confirmed. Pt was able to perform seated and standing BUE/LE therex with minimal verbal cues for correct technique. Pt will continue to benefit from skilled OT services to address impairments listed above to maximize independence with ADLs and functional mobility to ensure safe return to PLOF.     Rehab Prognosis:  Good; patient would benefit from acute skilled OT services to address these deficits and reach maximum level of function.       Plan:     Patient to be seen 4 x/week to address the above listed problems via self-care/home management, therapeutic activities, therapeutic exercises  Plan of Care Expires: 01/08/24  Plan of Care Reviewed with: patient, daughter    Subjective     Chief Complaint: none stated  Patient/Family Comments/goals: going home  Pain/Comfort:  Pain Rating Post-Intervention 1: 0/10    Objective:     Communicated with: RN prior to session.  Patient found up in chair with jennings catheter, telemetry, pulse ox (continuous) upon OT entry to room.    General Precautions: Standard, fall    Orthopedic Precautions:N/A  Braces: N/A  Respiratory Status: Nasal cannula, flow 1.5 L/min     Occupational Performance:     Bed Mobility:    None performed     Functional  Mobility/Transfers:  Patient completed Sit <> Stand Transfer with modified independence  with  rolling walker   Functional Mobility: Pt took a few steps around the chair to assist with managing lines to position himself for therex with SPV and RW    Activities of Daily Living:  No needs at this time      [facility] 6 Click ADL: 22    Treatment & Education:  Pt completed BUE/LE therex (x 10 reps each) including:  - seated arm punches  - scapular retractions  - tricep push downs  - shoulder press with RW as weight  - standing side bends  - standing alternating leg curls  - modified squats     Patient left up in chair with all lines intact, call button in reach, and daughter present    GOALS:   Multidisciplinary Problems       Occupational Therapy Goals          Problem: Occupational Therapy    Goal Priority Disciplines Outcome Interventions   Occupational Therapy Goal     OT, PT/OT Ongoing, Progressing    Description: Goals to be met by: 12/24/2023     Patient will increase functional independence with ADLs by performing:    UE Dressing with Stand-by Assistance.  LE Dressing with Stand-by Assistance.  Grooming while standing at sink with Stand-by Assistance.  Toileting from toilet with Stand-by Assistance for hygiene and clothing management.   Supine to sit with Modified [place].  Step transfer with Stand-by Assistance with LRAD as needed.   Toilet transfer to toilet with Stand-by Assistance with LRAD as needed.                         Time Tracking:     OT Date of Treatment: 12/19/23  OT Start Time: 0946  OT Stop Time: 1009  OT Total Time (min): 23 min    Billable Minutes:Therapeutic Exercise 23    OT/REGI: OT     Number of REGI visits since last OT visit: 4    12/19/2023

## 2023-12-19 NOTE — PLAN OF CARE
NURSING HOME ORDERS    12/19/2023  [facility]  [facility] - INTENSIVE [facility] ([place])  [address]  [facility] [address]  Dept: [phone]  Loc: [phone]     Admit to Nursing Home:  Skilled Nursing Facility    Diagnoses:  Active Hospital Problems    Diagnosis  POA    *COPD exacerbation [J44.1]  Yes    Urinary retention [R33.9]  No    Anxiety [F41.9]  Yes    History of benzodiazepine use [Z87.898]  No    Palliative care encounter [Z51.5]  Not Applicable    Abdominal pain [R10.9]  Yes    COPD (chronic obstructive pulmonary disease) [J44.9]  Yes    Hypertension [I10]  Yes      Resolved Hospital Problems   No resolved problems to display.       Patient is homebound due to:  COPD exacerbation    Allergies:  Review of patient's allergies indicates:   Allergen Reactions    Lisinopril Edema    Penicillin g sodium      Other reaction(s): unknown. was told by his mother.    Penicillins     Sulfa (sulfonamide antibiotics)        Vitals:  Routine    Diet: cardiac diet    Activities:   Activity as tolerated    Goals of Care Treatment Preferences:  Code Status: DNR          What is most important right now is to focus on improvement in condition but with limits to invasive therapies.  Accordingly, we have decided that the best plan to meet the patient's goals includes continuing with treatment.      Labs:  Per facility protocol    Nursing Precautions:  Fall    Consults:   PT to evaluate and treat- 3 times a week and OT to evaluate and treat- 3 times a week     Miscellaneous Care: Akhtra Care: Empty Akhtar bag every shift.  Change Akhtar every month              Medications: Discontinue all previous medication orders, if any. See new list below.     Medication List        START taking these medications      ALPRAZolam 0.5 MG tablet  Commonly known as: XANAX  Take 1 tablet (0.5 mg total) by mouth nightly as needed for Anxiety.     NIFEdipine 60 MG (OSM) 24 hr tablet  Commonly known as:  PROCARDIA-XL  Take 1 tablet (60 mg total) by mouth once daily.  Start taking on: December 20, 2023     polyethylene glycol 17 gram Pwpk  Commonly known as: GLYCOLAX  Take 17 g by mouth once daily.  Start taking on: December 20, 2023     tamsulosin 0.4 mg Cap  Commonly known as: FLOMAX  Take 1 capsule (0.4 mg total) by mouth once daily.  Start taking on: December 20, 2023            CHANGE how you take these medications      butalbital-acetaminophen-caffeine -40 mg -40 mg per tablet  Commonly known as: FIORICET, ESGIC  Take 1 tablet by mouth every 4 (four) hours as needed for Headaches.  What changed: reasons to take this            CONTINUE taking these medications      aspirin 325 MG EC tablet  Commonly known as: ECOTRIN  Take 325 mg by mouth once daily.     fluticasone propionate 50 mcg/actuation nasal spray  Commonly known as: FLONASE  1 spray by Each Nostril route once daily.     ipratropium 0.02 % nebulizer solution  Commonly known as: ATROVENT  Take 2.5 mLs (500 mcg total) by nebulization 3 (three) times daily. INHALE THE CONTENTS OF 1 VIAL VIA NEBULIZER THREE TIMES DAILY     levalbuterol 1.25 mg/3 mL nebulizer solution  Commonly known as: XOPENEX  Take 3 mLs (1.25 mg total) by nebulization 3 (three) times daily.          SYMBICORT 160-4.5 mcg/actuation Hfaa  Generic drug: budesonide-formoterol 160-4.5 mcg  Inhale 2 puffs into the lungs every 12 (twelve) hours.     VENTOLIN HFA 90 mcg/actuation inhaler  Generic drug: albuterol  INHALE 2 PUFFS INTO THE LUNGS EVERY 6 HOURS AS NEEDED FOR WHEEZING OR SHORTNESS OF BREATH                Immunizations Administered as of 12/19/2023       Name Date Dose VIS Date Route Exp Date    COVID-19, MRNA, LN-S, PF (Moderna) 3/30/2021 -- -- Intramuscular --    Site: Left arm     Lot: [id number]     COVID-19, MRNA, LN-S, PF (Moderna) 3/2/2021 -- -- Intramuscular --    Site: Left arm     Lot: [id number]             This patient has had both covid vaccinations    Some patients  may experience side effects after vaccination.  These may include fever, headache, muscle or joint aches.  Most symptoms resolve with 24-48 hours and do not require urgent medical evaluation unless they persist for more than 72 hours or symptoms are concerning for an unrelated medical condition.          _________________________________  Mack Cameron MD  12/19/2023

## 2023-12-19 NOTE — PROGRESS NOTES
[facility] ([address] Medicine  Progress Note    Patient Name: Francois Otero Sr.  MRN: [id number]  Patient Class: IP- Inpatient   Admission Date: 12/5/2023  Length of Stay: 14 days  Attending Physician: Mack Cameron*  Primary Care Provider: Carley Kothari MD        Subjective:     Principal Problem:COPD exacerbation        HPI:  Francois Otero Sr. is a 78M with PMHx of CAD, hyperlipidemia, hypertension and COPD on 5 L of O2 at home presents to the ED c/o worsening shortness of breath for the last several days worse with exertion. Patient reports chronic dyspnea mostly with exertion, uses oxygen consistently with exertion and at night but does not always use it at rest. Baseline SpO2 88-92%. Reports that for the last several days he has noticed decreased exercise tolerance and worsening shortness of breath. Has prednisone at home for emergencies and reports using prednisone 40mg daily for the last 3 days without improvement in his symptoms. Has had prior COPD exacerbations in the past, and this is similar. Denies having any recent sick contacts. Denies having any other symptoms, including worse cough or fevers. Reports consistent use of his inhalers, but more as rescue inhaler and not daily maintenance inhalers. Pt is prior smoker.      ED course: Afebrile, HDS, tachycardic, requiring BiPAP 10/5 30% and satting >90%. CBC significant for WBC 23k, CMP with mild acidosis, nl renal function, covid/flu/rsv negative, BNP/trop negative, VBG 7.43/37/49/25. CXR with hyperinflated lung, emphysematous changes, no consolidation. Admit to MICU for COPD exacerbation requiring BiPAP.    Overview/Hospital Course:  Admitted to MICU for COPD exacerbation requiring BiPAP. Started on solumedrol, duonebs, and levaquin. Working to wean BiPAP and transition to Comfort Flow. Palliative Care to be consulted for additional assistance with GOC discussions.  Patient steroids tapered as symptoms  allow along with nebulizers.  PT/OT consulted given patient's increased dyspnea symptoms limiting his physical conditioning.  Patient with worsening abdominal pain overnight refractory to NG tube placement.  CT abdomen/pelvis notable for significant urinary retention.  Akhtar catheter placed with improvement of patient's abdominal pain and respiratory status with 1300 cc removed.  NG tube removed the following day.  Akhtar catheter accidentally removed and nursing with difficulty replacing.  Akhtar then placed by Urology and patient started on tamsulosin.  Outpatient urology follow-up needed. Abdominal symptoms relieved. Oxygen weaning.    Interval History: Awaiting insurance approval. Medically ready. Patient feels well and has no complaints    Review of Systems  Objective:     Vital Signs (Most Recent):  Temp: 98.1 °F (36.7 °C) (12/19/23 0800)  Pulse: 95 (12/19/23 1512)  Resp: 17 (12/19/23 1347)  BP: 139/71 (12/19/23 0800)  SpO2: 95 % (12/19/23 1512) Vital Signs (24h Range):  Temp:  [97.6 °F (36.4 °C)-98.1 °F (36.7 °C)] 98.1 °F (36.7 °C)  Pulse:  [71-95] 95  Resp:  [17-22] 17  SpO2:  [94 %-97 %] 95 %  BP: (125-161)/(60-71) 139/71     Weight: 52 kg (114 lb 10.2 oz)  Body mass index is 20.97 kg/m².    Intake/Output Summary (Last 24 hours) at 12/19/2023 1605  Last data filed at 12/19/2023 0500  Gross per 24 hour   Intake --   Output 700 ml   Net -700 ml         Physical Exam  Constitutional:       Appearance: Normal appearance. He is normal weight.   HENT:      Head: Normocephalic and atraumatic.      Mouth/Throat:      Mouth: Mucous membranes are moist.   Eyes:      Extraocular Movements: Extraocular movements intact.      Conjunctiva/sclera: Conjunctivae normal.   Cardiovascular:      Rate and Rhythm: Normal rate and regular rhythm.      Heart sounds: No murmur heard.  Pulmonary:      Effort: Pulmonary effort is normal. No respiratory distress.      Breath sounds: Normal breath sounds. No wheezing or rales.       Comments: Reduced breath sounds  Abdominal:      General: Abdomen is flat. Bowel sounds are normal. There is no distension.      Palpations: Abdomen is soft.      Tenderness: There is no abdominal tenderness. There is no guarding.   Musculoskeletal:         General: No swelling or tenderness.   Skin:     Findings: No rash.   Neurological:      General: No focal deficit present.      Mental Status: He is alert and oriented to person, place, and time. Mental status is at baseline.             Significant Labs: All pertinent labs within the past 24 hours have been reviewed.    Significant Imaging: I have reviewed all pertinent imaging results/findings within the past 24 hours.    Assessment/Plan:      * COPD exacerbation  Because the patient is experiencing an acute worsening in baseline symptoms (such as cough, dyspnea, and/or sputum production) beyond normal daily variations to an extent that requires a change in therapy, they are in an acute COPD exacerbation.  Patient is is on oxygen at 5 L/min per nasal cannula..  They are currently exhibiting the following signs of a severe exacerbation: accessory respiratory muscle use and paradoxical chest wall movement.  As such, their exacerbation is classified as severe.     - Admit to MICU due to his need for continuous BiPAP  - Duonebs q6h while awake (or TID)  - Breo qd  - Steroids tapering: solumedrol 40mg q12h to Prednisone 60md daily on 12/14  - Levaquin 750 mg daily x 5 days, eot 12/11  - Xanax prn, morphine prn for anxiety/dyspnea  - Supplemental oxygen with SpO2 goal > 88%  - Smoking cessation education  - PT/OT    Urinary retention  - CT A/P with urinary bladder distention on 12/13  - Jennings placed with 1300cc UOP  - Continue jennings  - Started tamsulosin 0.4mg daily      History of benzodiazepine use  Prescribed Xanax 0.25 mg QHS PRN for anxiety since at least 2020.   - Xanax prn    Anxiety  Managed inpatient with Xanax PRN       Palliative care encounter  Pt with  chronic airway disease and frequent hospitalizations 2/t exacerbations.   - Palliative Care consulted for GOC discussion with pt and family.  - DNR  - weaning oxygen    Abdominal pain  - Chronic  - KUB 12/9: mild/mod gaseous distension , mild stool  - CT A/P on 12/13 with significant distension of the urinary bladder without upstream hydroureteronephrosis. Distension of the transverse colon with fecal material and air to the level of the distal descending colon.  No evidence of bowel obstruction.   - s/p jennings placement.   - Continue pantoja regimen     Hypertension  Listed on problem list, but no antihypertensives listed on home meds list.  SBP > 200 on presentation, down to 140s after 2 mg IV Ativan given.     - Losartan 25mg qd, nifedipine 30mg qd  - PRN labetalol IV for SBP > 170  - Will need close PCP follow-up after DC    COPD (chronic obstructive pulmonary disease)  See above      VTE Risk Mitigation (From admission, onward)           Ordered     heparin (porcine) injection 5,000 Units  Every 8 hours         12/09/23 1122     IP VTE HIGH RISK PATIENT  Once         12/05/23 1843     Place sequential compression device  Until discontinued         12/05/23 1843                    Discharge Planning   RONEL: 12/19/2023     Code Status: DNR   Is the patient medically ready for discharge?: Yes    Reason for patient still in hospital (select all that apply): Pending disposition  Discharge Plan A: Skilled Nursing Facility   Discharge Delays: None known at this time      Mack Cameron MD  Department of Hospital Medicine   [facility] - Intensive Care ([place])

## 2023-12-20 VITALS
HEIGHT: 62 IN | BODY MASS INDEX: 21.42 KG/M2 | SYSTOLIC BLOOD PRESSURE: 121 MMHG | HEART RATE: 86 BPM | RESPIRATION RATE: 16 BRPM | WEIGHT: 116.38 LBS | TEMPERATURE: 98 F | DIASTOLIC BLOOD PRESSURE: 62 MMHG | OXYGEN SATURATION: 92 %

## 2023-12-20 PROCEDURE — 94640 AIRWAY INHALATION TREATMENT: CPT

## 2023-12-20 PROCEDURE — 27000221 HC OXYGEN, UP TO 24 HOURS

## 2023-12-20 PROCEDURE — 97116 GAIT TRAINING THERAPY: CPT | Mod: CQ

## 2023-12-20 PROCEDURE — 25000003 PHARM REV CODE 250: Performed by: INTERNAL MEDICINE

## 2023-12-20 PROCEDURE — 99900035 HC TECH TIME PER 15 MIN (STAT)

## 2023-12-20 PROCEDURE — 25000003 PHARM REV CODE 250: Performed by: STUDENT IN AN ORGANIZED HEALTH CARE EDUCATION/TRAINING PROGRAM

## 2023-12-20 PROCEDURE — 25000242 PHARM REV CODE 250 ALT 637 W/ HCPCS: Performed by: STUDENT IN AN ORGANIZED HEALTH CARE EDUCATION/TRAINING PROGRAM

## 2023-12-20 PROCEDURE — 63600175 PHARM REV CODE 636 W HCPCS

## 2023-12-20 PROCEDURE — 94761 N-INVAS EAR/PLS OXIMETRY MLT: CPT

## 2023-12-20 PROCEDURE — 25000003 PHARM REV CODE 250

## 2023-12-20 RX ORDER — ALPRAZOLAM 0.5 MG/1
0.5 TABLET ORAL NIGHTLY PRN
Qty: 7 TABLET | Refills: 0 | Status: SHIPPED | OUTPATIENT
Start: 2023-12-20 | End: 2024-03-18

## 2023-12-20 RX ORDER — NIFEDIPINE 60 MG/1
60 TABLET, EXTENDED RELEASE ORAL DAILY
Qty: 30 TABLET | Refills: 11 | Status: SHIPPED | OUTPATIENT
Start: 2023-12-20 | End: 2024-12-19

## 2023-12-20 RX ORDER — TAMSULOSIN HYDROCHLORIDE 0.4 MG/1
0.4 CAPSULE ORAL DAILY
Qty: 30 CAPSULE | Refills: 11 | Status: SHIPPED | OUTPATIENT
Start: 2023-12-20 | End: 2024-01-10

## 2023-12-20 RX ORDER — POLYETHYLENE GLYCOL 3350 17 G/17G
17 POWDER, FOR SOLUTION ORAL DAILY
Qty: 510 G | Refills: 0 | Status: SHIPPED | OUTPATIENT
Start: 2023-12-20 | End: 2024-01-19

## 2023-12-20 RX ADMIN — HEPARIN SODIUM 5000 UNITS: 5000 INJECTION INTRAVENOUS; SUBCUTANEOUS at 06:12

## 2023-12-20 RX ADMIN — IPRATROPIUM BROMIDE 0.5 MG: 0.5 SOLUTION RESPIRATORY (INHALATION) at 08:12

## 2023-12-20 RX ADMIN — LEVALBUTEROL 1.25 MG: 1.25 SOLUTION, CONCENTRATE RESPIRATORY (INHALATION) at 01:12

## 2023-12-20 RX ADMIN — FLUTICASONE FUROATE AND VILANTEROL TRIFENATATE 1 PUFF: 200; 25 POWDER RESPIRATORY (INHALATION) at 08:12

## 2023-12-20 RX ADMIN — IPRATROPIUM BROMIDE 0.5 MG: 0.5 SOLUTION RESPIRATORY (INHALATION) at 01:12

## 2023-12-20 RX ADMIN — NIFEDIPINE 60 MG: 30 TABLET, FILM COATED, EXTENDED RELEASE ORAL at 08:12

## 2023-12-20 RX ADMIN — LEVALBUTEROL 1.25 MG: 1.25 SOLUTION, CONCENTRATE RESPIRATORY (INHALATION) at 08:12

## 2023-12-20 RX ADMIN — HEPARIN SODIUM 5000 UNITS: 5000 INJECTION INTRAVENOUS; SUBCUTANEOUS at 02:12

## 2023-12-20 RX ADMIN — ALPRAZOLAM 0.5 MG: 0.5 TABLET ORAL at 05:12

## 2023-12-20 RX ADMIN — TAMSULOSIN HYDROCHLORIDE 0.4 MG: 0.4 CAPSULE ORAL at 08:12

## 2023-12-20 RX ADMIN — PANTOPRAZOLE SODIUM 40 MG: 40 TABLET, DELAYED RELEASE ORAL at 08:12

## 2023-12-20 NOTE — PT/OT/SLP PROGRESS
Physical Therapy Treatment    Patient Name:  Francois Otero Sr.   MRN:  [id number]    Recommendations:     Discharge Recommendations: Moderate Intensity Therapy  Discharge Equipment Recommendations: walker, rolling  Barriers to discharge: None    Assessment:     Francois Otero Sr. is a 78 y.o. male admitted with a medical diagnosis of COPD exacerbation.  He presents with the following impairments/functional limitations: impaired endurance, impaired functional mobility, impaired cardiopulmonary response to activity, weakness.    Pt tolerates session well with emphasis on bed mobility, transfers, gait training, and stair training. Pt will continue to benefit from skilled therapy services.    Rehab Prognosis: Good; patient would benefit from acute skilled PT services to address these deficits and reach maximum level of function.    Recent Surgery: * No surgery found *      Plan:     During this hospitalization, patient to be seen 4 x/week to address the identified rehab impairments via gait training, therapeutic activities, therapeutic exercises, neuromuscular re-education and progress toward the following goals:    Plan of Care Expires:  01/10/24    Subjective     Chief Complaint: Pt expresses frustration with delays in discharge  Patient/Family Comments/goals: to get out of the hospital  Pain/Comfort:  Pain Rating 1: 0/10      Objective:     Communicated with RN (Maria Del Rosario) prior to session.  Patient found HOB elevated with jennings catheter, telemetry, pulse ox (continuous), oxygen upon PTA entry to room.     General Precautions: Standard, fall  Orthopedic Precautions: N/A  Braces: N/A  Respiratory Status: Nasal cannula, flow 1 L/min     Functional Mobility:  Bed Mobility:     Scooting: anteriorly to EOB modified independence  Supine to Sit: modified independence  Transfers:     Sit to Stand:  from EOB modified independence with rolling walker  Gait: Pt ambulates 110 feet, 74 feet , and 74 feetSupervision with RW. Portable  oxygen in tow. Brief standing rest breaks required between trials.   Stairs:  Pt ascended/descended 4 stair(s) with No Assistive Device with bilateral handrails with Stand-by Assistance. Standing rest break required after stair trial.       AM-PAC 6 CLICK MOBILITY  Turning over in bed (including adjusting bedclothes, sheets and blankets)?: 4  Sitting down on and standing up from a chair with arms (e.g., wheelchair, bedside commode, etc.): 4  Moving from lying on back to sitting on the side of the bed?: 4  Moving to and from a bed to a chair (including a wheelchair)?: 3  Need to walk in hospital room?: 3  Climbing 3-5 steps with a railing?: 3  Basic Mobility Total Score: 21       Treatment & Education:  Patient provided with daily orientation and goals of this PT session.     Patient left sitting edge of bed with all lines intact, call button in reach, RN notified, and Daughter present.    GOALS:   Multidisciplinary Problems       Physical Therapy Goals          Problem: Physical Therapy    Goal Priority Disciplines Outcome Goal Variances Interventions   Physical Therapy Goal     PT, PT/OT Ongoing, Progressing     Description: Goals to be met by: 23     Patient will increase functional independence with mobility by performin. Supine to sit with [place]  2. Sit to supine with [place]  3. Sit to stand transfer with Supervision  4. Bed to chair transfer with Supervision using No Assistive Device  5. Gait  x 150 feet with Supervision using No Assistive Device.   6. Ascend/descend 4 stair with unilateral handrails Supervision using No Assistive Device.                          Time Tracking:     PT Received On: 23  PT Start Time: 1224     PT Stop Time: 1237  PT Total Time (min): 13 min     Billable Minutes: Gait Training 13    Treatment Type: Treatment  PT/PTA: PTA     Number of PTA visits since last PT visit: 5     2023

## 2023-12-20 NOTE — PLAN OF CARE
Pt in communication with pt and pt's daughter, Jennifer.  Pt accepted by [facility]. SW spoke to Kaylie; will be able to admit patient on Friday.  Pt has home O2 w/ [employer].  Medical team wrote prescription for portable concentrator.  Pt currently has a concentrator through insurance and would like an additional portable concentrator to keep at daughter's home.  Pt will have to follow up with [employer]. Pt's daughter will provide transportation.  Pt waiting for delivery of bedside meds.      Discharge Plan A and Plan B have been determined by review of patient's clinical status, future medical and therapeutic needs, and coverage/benefits for post-acute care in coordination with multidisciplinary team members.     12/20/23 1600   Post-Acute Status   Post-Acute Authorization Home Health;Placement   Post-Acute Placement Status Discharge Plan Changed  (Insurance denied pt for SNF placement.  P2P was also denied.)   Home Health Status Set-up Complete/Auth obtained  ([facility]   Coverage [employer] Managed Medicare   Discharge Delays None known at this time   Discharge Plan   Discharge Plan A Home;Home with family;Home Health   Discharge Plan B Home     Marilia Street LMSW  Part-Time-  [facility]  Ext. [phone]

## 2023-12-20 NOTE — PLAN OF CARE
[facility] ([facility]  Discharge Reassessment    Primary Care Provider: Carley Kothari MD    Expected Discharge Date: 12/20/2023    SW met with patient and pt's daughter, Jennifer, to discuss discharge plan.  SW advised pt P2P for SNF placement was denied for the following reason:  Pt functional.  Pt and family agreeable to going home with HH.  Pt does not have a preference for HH facility.  [facility] sent blast referrals for HH providers in network with [facility].      Pt will discharge home to pt's daughter (Jennifer) home. Address:  [address] [place], [place] [address] [address]; [phone].    Referrals sent to the following facilities:  [facility].    Discharge Plan A and Plan B have been determined by review of patient's clinical status, future medical and therapeutic needs, and coverage/benefits for post-acute care in coordination with multidisciplinary team members.      Reassessment (most recent)       Discharge Reassessment - 12/14/23 4657          Discharge Reassessment    Assessment Type Discharge Planning Reassessment     Did the patient's condition or plan change since previous assessment? No     Discharge Plan discussed with: Patient;Adult children     Communicated RONEL with patient/caregiver Yes     Discharge Plan A Skilled Nursing Facility     Discharge Plan B Home with family;Home Health     DME Needed Upon Discharge  other (see comments)   TBD    Transition of Care Barriers None     Why the patient remains in the hospital Requires continued medical care        Post-Acute Status    Post-Acute Authorization Placement     Post-Acute Placement Status Referrals Sent     Coverage [employer] MANAGED MEDICARE - [employer] MEDICARE HMO -     Patient choice form signed by patient/caregiver List with quality metrics by geographic area provided     Discharge Delays None known at this time                   Marilia Street LMSW  Part-Time-Social  Worker  [facility]  Ext. [phone]

## 2023-12-21 NOTE — PLAN OF CARE
[facility] - Intensive Care ([facility])  Discharge Final Note    Primary Care Provider: Carley Kothari MD    Expected Discharge Date: 12/20/2023    Pt accepted by [facility]; will see patient on Friday. [facility] was notified pt will stay with his daughter until he is doing better.  Pt inquired about the purchase of a 2nd portable concentrator. Md provided a prescription. Pt currently has his DME with [employer] (concentrator, O2).  Pt will follow up outpatient.      Pt's daughter provided transportation home.      Discharge Plan A and Plan B have been determined by review of patient's clinical status, future medical and therapeutic needs, and coverage/benefits for post-acute care in coordination with multidisciplinary team members.      Future Appointments   Date Time Provider Department Center   12/22/2023 10:20 AM Zehra Boone NP [facility] UROLOG [place]   12/27/2023  9:00 AM Regino Garduno NP [facility] - [place]   1/10/2024 11:30 AM Jennifer Jerome NP [facility]   2/9/2024 10:45 AM Tevin Gamboa MD [facility] CARDIO [place]       Final Discharge Note (most recent)       Final Note - 12/20/23 2056          Final Note    Assessment Type Final Discharge Note     Anticipated Discharge Disposition Home-Health Care [facility]       Post-Acute Status    Post-Acute Authorization Home Health;Placement     Post-Acute Placement Status Discharge Plan Changed   Pt denied for SNF by [employer].  P2P denied.    Home Health Status Set-up Complete/Auth obtained   [facility].    Coverage [employer] Managed Medicare     Discharge Delays None known at this time                     Important Message from Medicare  Important Message from Medicare regarding Discharge Appeal Rights: Explained to patient/caregiver     Date IMM was signed: 12/20/23  Time IMM was signed: 2923    [address] LMSW  Part-Time-  [facility]  Ext. [phone]

## 2023-12-22 ENCOUNTER — TELEPHONE (OUTPATIENT)
Dept: UROLOGY | Facility: CLINIC | Age: 78
End: 2023-12-22

## 2023-12-22 ENCOUNTER — OFFICE VISIT (OUTPATIENT)
Dept: UROLOGY | Facility: CLINIC | Age: 78
End: 2023-12-22
Payer: MEDICARE

## 2023-12-22 ENCOUNTER — PATIENT OUTREACH (OUTPATIENT)
Dept: ADMINISTRATIVE | Facility: CLINIC | Age: 78
End: 2023-12-22
Payer: MEDICARE

## 2023-12-22 VITALS
HEIGHT: 62 IN | WEIGHT: 105.25 LBS | DIASTOLIC BLOOD PRESSURE: 57 MMHG | HEART RATE: 96 BPM | SYSTOLIC BLOOD PRESSURE: 125 MMHG | BODY MASS INDEX: 19.37 KG/M2

## 2023-12-22 DIAGNOSIS — R33.9 URINARY RETENTION: ICD-10-CM

## 2023-12-22 DIAGNOSIS — N13.8 BPH WITH OBSTRUCTION/LOWER URINARY TRACT SYMPTOMS: Primary | ICD-10-CM

## 2023-12-22 DIAGNOSIS — N40.1 BPH WITH OBSTRUCTION/LOWER URINARY TRACT SYMPTOMS: Primary | ICD-10-CM

## 2023-12-22 PROCEDURE — 99999 PR PBB SHADOW E&M-EST. PATIENT-LVL IV: ICD-10-PCS | Mod: PBBFAC,,,

## 2023-12-22 PROCEDURE — 1159F MED LIST DOCD IN RCRD: CPT | Mod: CPTII,S$GLB,,

## 2023-12-22 PROCEDURE — 1111F PR DISCHARGE MEDS RECONCILED W/ CURRENT OUTPATIENT MED LIST: ICD-10-PCS | Mod: CPTII,S$GLB,,

## 2023-12-22 PROCEDURE — 3288F FALL RISK ASSESSMENT DOCD: CPT | Mod: CPTII,S$GLB,,

## 2023-12-22 PROCEDURE — 99999 PR PBB SHADOW E&M-EST. PATIENT-LVL IV: CPT | Mod: PBBFAC,,,

## 2023-12-22 PROCEDURE — 99214 PR OFFICE/OUTPT VISIT, EST, LEVL IV, 30-39 MIN: ICD-10-PCS | Mod: 25,S$GLB,,

## 2023-12-22 PROCEDURE — 99214 OFFICE O/P EST MOD 30 MIN: CPT | Mod: 25,S$GLB,,

## 2023-12-22 PROCEDURE — 1159F PR MEDICATION LIST DOCUMENTED IN MEDICAL RECORD: ICD-10-PCS | Mod: CPTII,S$GLB,,

## 2023-12-22 PROCEDURE — 3288F PR FALLS RISK ASSESSMENT DOCUMENTED: ICD-10-PCS | Mod: CPTII,S$GLB,,

## 2023-12-22 PROCEDURE — 1160F PR REVIEW ALL MEDS BY PRESCRIBER/CLIN PHARMACIST DOCUMENTED: ICD-10-PCS | Mod: CPTII,S$GLB,,

## 2023-12-22 PROCEDURE — 1101F PT FALLS ASSESS-DOCD LE1/YR: CPT | Mod: CPTII,S$GLB,,

## 2023-12-22 PROCEDURE — 3074F SYST BP LT 130 MM HG: CPT | Mod: CPTII,S$GLB,,

## 2023-12-22 PROCEDURE — 3074F PR MOST RECENT SYSTOLIC BLOOD PRESSURE < 130 MM HG: ICD-10-PCS | Mod: CPTII,S$GLB,,

## 2023-12-22 PROCEDURE — 1126F PR PAIN SEVERITY QUANTIFIED, NO PAIN PRESENT: ICD-10-PCS | Mod: CPTII,S$GLB,,

## 2023-12-22 PROCEDURE — 1126F AMNT PAIN NOTED NONE PRSNT: CPT | Mod: CPTII,S$GLB,,

## 2023-12-22 PROCEDURE — 1111F DSCHRG MED/CURRENT MED MERGE: CPT | Mod: CPTII,S$GLB,,

## 2023-12-22 PROCEDURE — 3078F DIAST BP <80 MM HG: CPT | Mod: CPTII,S$GLB,,

## 2023-12-22 PROCEDURE — 1101F PR PT FALLS ASSESS DOC 0-1 FALLS W/OUT INJ PAST YR: ICD-10-PCS | Mod: CPTII,S$GLB,,

## 2023-12-22 PROCEDURE — 3078F PR MOST RECENT DIASTOLIC BLOOD PRESSURE < 80 MM HG: ICD-10-PCS | Mod: CPTII,S$GLB,,

## 2023-12-22 PROCEDURE — 51700 PR IRRIGATION, BLADDER: ICD-10-PCS | Mod: S$GLB,,,

## 2023-12-22 PROCEDURE — 1160F RVW MEDS BY RX/DR IN RCRD: CPT | Mod: CPTII,S$GLB,,

## 2023-12-22 PROCEDURE — 51700 IRRIGATION OF BLADDER: CPT | Mod: S$GLB,,,

## 2023-12-22 RX ORDER — FINASTERIDE 5 MG/1
5 TABLET, FILM COATED ORAL DAILY
Qty: 90 TABLET | Refills: 3 | Status: SHIPPED | OUTPATIENT
Start: 2023-12-22 | End: 2024-12-21

## 2023-12-22 NOTE — PATIENT INSTRUCTIONS
- Continue daily Flomax.  - Begin daily Finasteride to shrink prostate size.      Patient was instructed to drink plenty of fluids today. 6 oz per hour or less if contraindicated.     Urology will call you at 1 p.m. to check on urine output. Measure intake and output.     You will need to return to clinic or emergency department (if after clinic hours) to have jennings catheter put back in if unable to urinate within 5 hours of jennings catheter removal or starts to experience bladder pressure/pain, decrease flow, straining/difficulty urinating, urinary frequency.      Urology #[phone]

## 2023-12-22 NOTE — PROGRESS NOTES
CHIEF COMPLAINT:  Voiding trial      HISTORY OF PRESENTING ILLINESS:  Francois Otero Sr. is a 78 y.o. male new to urology. He presents today for a voiding trial. He was hospitalized 12/5/23 for COPD exacerbation. Patient was discharged 12/20/23. He had 1300 ml urine output when jennings catheter initially placed. He is on daily Flomax. PMHx listed below.            REVIEW OF SYSTEMS:  Review of Systems   Constitutional:  Negative for chills and fever.   HENT:  Negative for congestion and sore throat.    Respiratory:  Negative for cough and shortness of breath.    Cardiovascular:  Negative for chest pain and palpitations.   Gastrointestinal:  Negative for nausea and vomiting.   Genitourinary:  Negative for flank pain and hematuria.        Jennings catheter present    Neurological:  Negative for dizziness and headaches.         PATIENT HISTORY:    Past Medical History:   Diagnosis Date    Colon polyp     COPD (chronic obstructive pulmonary disease)     Coronary artery disease     Hyperlipidemia     Hypertension     On home oxygen therapy     Pneumonia due to COVID-19 virus 12/03/2020 12/3/2020    Tobacco abuse     1 PPD X 59 Yrs    Vertigo        Past Surgical History:   Procedure Laterality Date    by pass on leg       CARDIAC CATHETERIZATION      cardiac stents       COLONOSCOPY N/A 9/14/2017    Procedure: COLONOSCOPY;  Surgeon: Robb Yepez MD;  Location: [facility];  Service: Endoscopy;  Laterality: N/A; repeat in 5 years    UPPER GASTROINTESTINAL ENDOSCOPY  09/14/2017    Dr. Yepez       Family History   Problem Relation Age of Onset    Cancer Mother     Emphysema Mother     Colon cancer Mother         unsure of age of diagnosis    Cataracts Mother     Heart disease Father     Cataracts Maternal Aunt     Crohn's disease Neg Hx     Ulcerative colitis Neg Hx     Stomach cancer Neg Hx     Esophageal cancer Neg Hx     Glaucoma Neg Hx     Retinal detachment Neg Hx     Macular degeneration Neg Hx     Strabismus Neg Hx         Social History     Socioeconomic History    Marital status:      Spouse name: Heather Angel    Number of children: 2   Tobacco Use    Smoking status: Former     Current packs/day: 0.00     Types: Cigarettes     Quit date: 9/3/2015     Years since quittin.3    Smokeless tobacco: Never   Substance and Sexual Activity    Alcohol use: Yes     Alcohol/week: 0.0 standard drinks of alcohol     Comment: rarely     Drug use: No     Social Determinants of Health     Food Insecurity: No Food Insecurity (2023)    Hunger Vital Sign     Worried About Running Out of Food in the Last Year: Never true     Ran Out of Food in the Last Year: Never true   Transportation Needs: No Transportation Needs (2023)    PRAPARE - Transportation     Lack of Transportation (Medical): No     Lack of Transportation (Non-Medical): No   Physical Activity: Patient Declined (2023)    Exercise Vital Sign     Days of Exercise per Week: Patient declined     Minutes of Exercise per Session: Patient declined   Social Connections: Unknown (2023)    Social Connection and Isolation Panel [NHANES]     Frequency of Social Gatherings with Friends and Family: Three times a week     Active Member of Clubs or Organizations: No     Attends Club or Organization Meetings: Never     Marital Status:    Housing Stability: Low Risk  (2023)    Housing Stability Vital Sign     Unable to Pay for Housing in the Last Year: No     Number of Places Lived in the Last Year: 1     Unstable Housing in the Last Year: No       Allergies:  Lisinopril, Penicillin g sodium, Penicillins, and Sulfa (sulfonamide antibiotics)    Medications:    Current Outpatient Medications:     ALPRAZolam (XANAX) 0.5 MG tablet, Take 1 tablet (0.5 mg total) by mouth nightly as needed for Anxiety., Disp: 7 tablet, Rfl: 0    aspirin (ECOTRIN) 325 MG EC tablet, Take 325 mg by mouth once daily., Disp: , Rfl:     butalbital-acetaminophen-caffeine -40 mg (FIORICET,  ESGIC) -40 mg per tablet, Take 1 tablet by mouth every 4 (four) hours as needed for Headaches., Disp: 20 tablet, Rfl: 0    fluticasone propionate (FLONASE) 50 mcg/actuation nasal spray, 1 spray by Each Nostril route once daily., Disp: , Rfl:     levalbuterol (XOPENEX) 1.25 mg/3 mL nebulizer solution, Take 3 mLs (1.25 mg total) by nebulization 3 (three) times daily., Disp: 270 mL, Rfl: 3    NIFEdipine (PROCARDIA-XL) 60 MG (OSM) 24 hr tablet, Take 1 tablet (60 mg total) by mouth once daily., Disp: 30 tablet, Rfl: 11    polyethylene glycol (GLYCOLAX) 17 gram/dose powder, Use cap to measure out (17 g) mix with a liquid and take by mouth once daily., Disp: 510 g, Rfl: 0    SYMBICORT 160-4.5 mcg/actuation HFAA, Inhale 2 puffs into the lungs every 12 (twelve) hours., Disp: , Rfl:     tamsulosin (FLOMAX) 0.4 mg Cap, Take 1 capsule (0.4 mg total) by mouth once daily., Disp: 30 capsule, Rfl: 11    VENTOLIN HFA 90 mcg/actuation inhaler, INHALE 2 PUFFS INTO THE LUNGS EVERY 6 HOURS AS NEEDED FOR WHEEZING OR SHORTNESS OF BREATH, Disp: 54 Inhaler, Rfl: 6    finasteride (PROSCAR) 5 mg tablet, Take 1 tablet (5 mg total) by mouth once daily., Disp: 90 tablet, Rfl: 3    ipratropium (ATROVENT) 0.02 % nebulizer solution, Take 2.5 mLs (500 mcg total) by nebulization 3 (three) times daily. INHALE THE CONTENTS OF 1 VIAL VIA NEBULIZER THREE TIMES DAILY, Disp: 675 mL, Rfl: 3    PHYSICAL EXAMINATION:  Physical Exam  Constitutional:       Appearance: He is ill-appearing.   HENT:      Head: Normocephalic and atraumatic.      Right Ear: External ear normal.      Left Ear: External ear normal.      Nose: Nose normal.      Mouth/Throat:      Mouth: Mucous membranes are moist.   Pulmonary:      Effort: Pulmonary effort is normal.   Skin:     General: Skin is warm and dry.   Neurological:      General: No focal deficit present.      Mental Status: He is alert.   Psychiatric:         Mood and Affect: Mood normal.         Behavior: Behavior  normal.             Lab Results   Component Value Date    PSA 0.74 11/21/2019    PSA 0.92 12/12/2017       Lab Results   Component Value Date    CREATININE 0.8 12/19/2023    EGFRNORACEVR >60.0 12/19/2023             IMPRESSION:    Encounter Diagnoses   Name Primary?    BPH with obstruction/lower urinary tract symptoms Yes    Urinary retention          Assessment:       1. BPH with obstruction/lower urinary tract symptoms    2. Urinary retention        Plan:   Voiding trial performed by Nurse Arya.  400 ml of sterile water was instilled into bladder.  Jennings catheter was removed. Patient urinated 100 ml without difficulty. I recommended a jennings catheter be replaced while in office. Patient stated that a catheter would not be re-inserted without sedation. Informed the patient that a catheter can not be inserted under sedation in clinic or the ED.     Voiding trial unequivocal.   Patient was instructed to drink plenty of fluids today.  Informed patient to write down I&O and urology will call him at 1300 to check on urination status. Will call Jennifer.  I instructed patient to return to clinic or emergency department (if after clinic hours) to have jennings catheter put back in if unable to urinate within 5 hours of jennings catheter removal or starts to experience bladder pressure/pain, decrease flow, straining/difficulty urinating, urinary frequency.    Patient voiced understanding.    Rx daily Proscar sent to pharmacy of choice. MoA discussed as well as length of time for medication to begin working.    Return to clinic in 6-8 weeks for PVR.    I spent 45 minutes with the patient of which more than half was spent in direct consultation with the patient in regards to our treatment and plan.  We addressed the office findings and recent labs.   Education and recommendations of today's plan of care including home remedies and needed follow up with PCP.   We discussed the chief complaint; reviewed the LUTS and the possible  contributory factors.

## 2023-12-22 NOTE — PROGRESS NOTES
C3 nurse attempted to contact Francois Otero Sr. for a TCC post hospital discharge follow up call. No answer. Left voicemail with callback information. The patient has a scheduled HOSFU appointment with Regnio Garduno NP  on 12/27/23 @ 0900.

## 2023-12-23 NOTE — DISCHARGE SUMMARY
[facility] - Intensive Care ([place] [id number])  [facility] Medicine  Discharge Summary      Patient Name: Francois Otero Sr.  MRN: [id number]  TESSA: [id number]  Patient Class: IP- Inpatient  Admission Date: 12/5/2023  Hospital Length of Stay: 15 days  Discharge Date and Time: 12/20/23  Attending Physician: No att. providers found   Discharging Provider: Mack Cameron MD  Primary Care Provider: Carley Kothari MD  [facility] Medicine Team: [facility]OS[facility] MED  Mack Cameron MD  Primary Care Team: [facility] B    HPI:   Francois Otero Sr. is a 78M with PMHx of CAD, hyperlipidemia, hypertension and COPD on 5 L of O2 at home presents to the ED c/o worsening shortness of breath for the last several days worse with exertion. Patient reports chronic dyspnea mostly with exertion, uses oxygen consistently with exertion and at night but does not always use it at rest. Baseline SpO2 88-92%. Reports that for the last several days he has noticed decreased exercise tolerance and worsening shortness of breath. Has prednisone at home for emergencies and reports using prednisone 40mg daily for the last 3 days without improvement in his symptoms. Has had prior COPD exacerbations in the past, and this is similar. Denies having any recent sick contacts. Denies having any other symptoms, including worse cough or fevers. Reports consistent use of his inhalers, but more as rescue inhaler and not daily maintenance inhalers. Pt is prior smoker.      ED course: Afebrile, HDS, tachycardic, requiring BiPAP 10/5 30% and satting >90%. CBC significant for WBC 23k, CMP with mild acidosis, nl renal function, covid/flu/rsv negative, BNP/trop negative, VBG 7.43/37/49/25. CXR with hyperinflated lung, emphysematous changes, no consolidation. Admit to MICU for COPD exacerbation requiring BiPAP.          Hospital Course:   Admitted to MICU for COPD exacerbation requiring BiPAP. Started on solumedrol, duonebs, and levaquin. Working to wean BiPAP  and transition to Comfort Flow. Palliative Care to be consulted for additional assistance with GOC discussions.  Patient steroids tapered as symptoms allow along with nebulizers.  PT/OT consulted given patient's increased dyspnea symptoms limiting his physical conditioning.  Patient with worsening abdominal pain overnight refractory to NG tube placement.  CT abdomen/pelvis notable for significant urinary retention.  Jennings catheter placed with improvement of patient's abdominal pain and respiratory status with 1300 cc removed.  NG tube removed the following day.  Jennings catheter accidentally removed and nursing with difficulty replacing.  Jennings then placed by Urology and patient started on tamsulosin.  Outpatient urology follow-up needed. Abdominal symptoms relieved following jennings placement. Oxygen weaning. Patient deemed ready for discharge. Insurance did not approve SNF. Patient and family wanted to be discharged with hh. Plan discussed with pt, who was agreeable and amenable; medications were discussed and reviewed, outpatient follow-up arranged, ER precautions were given, all questions were answered to the pt's satisfaction, and Francois Otero Juany  was subsequently discharged.       Goals of Care Treatment Preferences:  Code Status: DNR          What is most important right now is to focus on improvement in condition but with limits to invasive therapies.  Accordingly, we have decided that the best plan to meet the patient's goals includes continuing with treatment.      Consults:   Consults (From admission, onward)          Status Ordering Provider     Inpatient consult to Urology  Once        Provider:  (Not yet assigned)    Completed ALDAIR SALVADOR     Inpatient consult to Palliative Care  Once        Provider:  (Not yet assigned)    Completed POLLO COOPER     Inpatient consult to Critical Care Medicine  Once        Provider:  (Not yet assigned)    Completed PORFIRIO IVY            No new Assessment  "& Plan notes have been filed under this hospital service since the last note was generated.  Service: Hospital Medicine    Final Active Diagnoses:    Diagnosis Date Noted POA    PRINCIPAL PROBLEM:  COPD exacerbation [J44.1] 12/05/2023 Yes    Urinary retention [R33.9] 12/13/2023 No    Anxiety [F41.9] 12/05/2023 Yes    History of benzodiazepine use [Z87.898] 12/05/2023 No    Palliative care encounter [Z51.5] 12/03/2020 Not Applicable    Abdominal pain [R10.9] 12/29/2016 Yes    COPD (chronic obstructive pulmonary disease) [J44.9]  Yes    Hypertension [I10]  Yes      Problems Resolved During this Admission:       Discharged Condition: good    Disposition: Home-Health Care Svc    Follow Up:    Patient Instructions:      OXYGEN FOR HOME USE     Order Specific Question Answer Comments   Liter Flow 5    Duration continuous    Qualifying Test Performed at: Activity    Oxygen saturation at rest 91    Oxygen saturation with activity 87    Oxygen saturation with activity on oxygen 93    Portable mode: continuous    Route nasal cannula    Device: home concentrator with portable concentrator    Length of need (in months): 99 mos    Patient condition with qualifying saturation COPD    Height: 5' 2" (1.575 m)    Weight: 52.8 kg (116 lb 6.5 oz)    Alternative treatment measures have been tried or considered and deemed clinically ineffective. Yes        Significant Diagnostic Studies: N/A    Pending Diagnostic Studies:       None           Medications:  Reconciled Home Medications:      Medication List        START taking these medications      ALPRAZolam 0.5 MG tablet  Commonly known as: XANAX  Take 1 tablet (0.5 mg total) by mouth nightly as needed for Anxiety.     GAVILAX 17 gram/dose powder  Generic drug: polyethylene glycol  Use cap to measure out (17 g) mix with a liquid and take by mouth once daily.     NIFEdipine 60 MG (OSM) 24 hr tablet  Commonly known as: PROCARDIA-XL  Take 1 tablet (60 mg total) by mouth once daily.   "   tamsulosin 0.4 mg Cap  Commonly known as: FLOMAX  Take 1 capsule (0.4 mg total) by mouth once daily.            CHANGE how you take these medications      butalbital-acetaminophen-caffeine -40 mg -40 mg per tablet  Commonly known as: FIORICET, ESGIC  Take 1 tablet by mouth every 4 (four) hours as needed for Headaches.  What changed: reasons to take this            CONTINUE taking these medications      aspirin 325 MG EC tablet  Commonly known as: ECOTRIN  Take 325 mg by mouth once daily.     fluticasone propionate 50 mcg/actuation nasal spray  Commonly known as: FLONASE  1 spray by Each Nostril route once daily.     ipratropium 0.02 % nebulizer solution  Commonly known as: ATROVENT  Take 2.5 mLs (500 mcg total) by nebulization 3 (three) times daily. INHALE THE CONTENTS OF 1 VIAL VIA NEBULIZER THREE TIMES DAILY     levalbuterol 1.25 mg/3 mL nebulizer solution  Commonly known as: XOPENEX  Take 3 mLs (1.25 mg total) by nebulization 3 (three) times daily.     SYMBICORT 160-4.5 mcg/actuation Hfaa  Generic drug: budesonide-formoterol 160-4.5 mcg  Inhale 2 puffs into the lungs every 12 (twelve) hours.     VENTOLIN HFA 90 mcg/actuation inhaler  Generic drug: albuterol  INHALE 2 PUFFS INTO THE LUNGS EVERY 6 HOURS AS NEEDED FOR WHEEZING OR SHORTNESS OF BREATH            STOP taking these medications      predniSONE 10 MG tablet  Commonly known as: DELTASONE              Indwelling Lines/Drains at time of discharge:   Lines/Drains/Airways       None                   Time spent on the discharge of patient: 35 minutes         Mack Cameron MD  Department of Hospital Medicine  [facility] - Intensive Care ([place]

## 2023-12-26 NOTE — PROGRESS NOTES
C3 nurse spoke with Francois Otero 's daughter Jennifer for a TCC post hospital discharge follow up call. The patient has a scheduled HOSFU appointment with Regino Garduno NP on 12/27/23 @ 0900.

## 2023-12-27 ENCOUNTER — TELEPHONE (OUTPATIENT)
Dept: UROLOGY | Facility: CLINIC | Age: 78
End: 2023-12-27
Payer: MEDICARE

## 2023-12-27 NOTE — TELEPHONE ENCOUNTER
Pt daughter has valentin informed and he will come In to see EMIL Danielle in two weeks for  voiding trial    ----- Message from Marty Muse MD sent at 12/27/2023  3:19 PM CST -----  Regarding: RE: Francois urinary retention  Patient needs to undergo cystoscopic evaluation in office to determine if he is a candidate for UroLift procedure versus enucleation of the prostate.  We can give patient a voiding trial 1st after he has been with a catheter for 2 weeks and see if he is responding to the medications and if his muscle is healed well enough for him to void.  If he is unable to void he needs to undergo the cystoscopic evaluation and schedule whichever procedures appropriate and he will have to keep a catheter until the time of the procedure.  So I would arrange voiding trial in 2 weeks which can be done in the office with either myself or nurse practitioner Shashi and then he can be scheduled to follow-up for office cystourethroscopy with me after voiding trial.  Patient should undergo this even if he passes the voiding trial so at least evaluate his prostate and know what to do if this continues to occur in the future.  Dr. GARCIA  ----- Message -----  From: Tasia Taylor LPN  Sent: 12/27/2023  12:50 PM CST  To: Marty Muse MD  Subject: Francois urinary retention                          I spoke with the Patients daughter.   Pt admitted to the hospital 12/5/23 for COPD  12/15/23 Akhtar was placed at the hospital   12/22/23 Failed VT in  office. Refused to have another catheter placed.  12/26/23 went to the ED for urinary retention. BladderScan reveled 2L of fluid. Akhtar was placed.  Pt was placed on daily Flomax 12/19/23, Finasteride on 12/21/23, and Keflex was ordered and started today.  Pt uses [facility] in [place] if you would like him to start anything else. When would you like to see him in the office?

## 2023-12-27 NOTE — TELEPHONE ENCOUNTER
----- Message from Marty Muse MD sent at 12/27/2023  3:19 PM CST -----  Regarding: RE: Francois urinary retention  Patient needs to undergo cystoscopic evaluation in office to determine if he is a candidate for UroLift procedure versus enucleation of the prostate.  We can give patient a voiding trial 1st after he has been with a catheter for 2 weeks and see if he is responding to the medications and if his muscle is healed well enough for him to void.  If he is unable to void he needs to undergo the cystoscopic evaluation and schedule whichever procedures appropriate and he will have to keep a catheter until the time of the procedure.  So I would arrange voiding trial in 2 weeks which can be done in the office with either myself or nurse practitioner Shashi and then he can be scheduled to follow-up for office cystourethroscopy with me after voiding trial.  Patient should undergo this even if he passes the voiding trial so at least evaluate his prostate and know what to do if this continues to occur in the future.  Dr. GARCIA  ----- Message -----  From: Tasia Tyalor LPN  Sent: 12/27/2023  12:50 PM CST  To: Marty Muse MD  Subject: Francois urinary retention                          I spoke with the Patients daughter.   Pt admitted to the hospital 12/5/23 for COPD  12/15/23 Akhtar was placed at the hospital   12/22/23 Failed VT in  office. Refused to have another catheter placed.  12/26/23 went to the ED for urinary retention. BladderScan reveled 2L of fluid. Akhtar was placed.  Pt was placed on daily Flomax 12/19/23, Finasteride on 12/21/23, and Keflex was ordered and started today.  [facility] uses [facility] [place] if you would like him to start anything else. When would you like to see him in the office?

## 2024-01-01 ENCOUNTER — OFFICE VISIT (OUTPATIENT)
Dept: UROLOGY | Facility: CLINIC | Age: 79
End: 2024-01-01
Payer: MEDICARE

## 2024-01-01 ENCOUNTER — OFFICE VISIT (OUTPATIENT)
Dept: RADIATION ONCOLOGY | Facility: CLINIC | Age: 79
End: 2024-01-01
Payer: MEDICARE

## 2024-01-01 ENCOUNTER — PATIENT MESSAGE (OUTPATIENT)
Dept: HEMATOLOGY/ONCOLOGY | Facility: CLINIC | Age: 79
End: 2024-01-01
Payer: MEDICARE

## 2024-01-01 ENCOUNTER — OFFICE VISIT (OUTPATIENT)
Dept: HEMATOLOGY/ONCOLOGY | Facility: CLINIC | Age: 79
End: 2024-01-01
Payer: MEDICARE

## 2024-01-01 ENCOUNTER — HOSPITAL ENCOUNTER (EMERGENCY)
Facility: HOSPITAL | Age: 79
End: 2024-10-18
Attending: STUDENT IN AN ORGANIZED HEALTH CARE EDUCATION/TRAINING PROGRAM
Payer: MEDICARE

## 2024-01-01 ENCOUNTER — TELEPHONE (OUTPATIENT)
Dept: PULMONOLOGY | Facility: CLINIC | Age: 79
End: 2024-01-01
Payer: MEDICARE

## 2024-01-01 ENCOUNTER — HOSPITAL ENCOUNTER (OUTPATIENT)
Dept: RADIOLOGY | Facility: HOSPITAL | Age: 79
Discharge: HOME | End: 2024-10-16
Attending: STUDENT IN AN ORGANIZED HEALTH CARE EDUCATION/TRAINING PROGRAM
Payer: MEDICARE

## 2024-01-01 VITALS
DIASTOLIC BLOOD PRESSURE: 70 MMHG | SYSTOLIC BLOOD PRESSURE: 188 MMHG | HEIGHT: 62 IN | WEIGHT: 114.63 LBS | HEART RATE: 82 BPM | TEMPERATURE: 98 F | RESPIRATION RATE: 16 BRPM | BODY MASS INDEX: 21.1 KG/M2

## 2024-01-01 VITALS — BODY MASS INDEX: 20.98 KG/M2 | WEIGHT: 114 LBS | TEMPERATURE: 98 F | HEIGHT: 62 IN

## 2024-01-01 VITALS
SYSTOLIC BLOOD PRESSURE: 127 MMHG | HEART RATE: 91 BPM | WEIGHT: 109.81 LBS | BODY MASS INDEX: 20.21 KG/M2 | DIASTOLIC BLOOD PRESSURE: 63 MMHG | HEIGHT: 62 IN

## 2024-01-01 VITALS
HEIGHT: 62 IN | BODY MASS INDEX: 21.1 KG/M2 | TEMPERATURE: 92 F | OXYGEN SATURATION: 89 % | HEART RATE: 79 BPM | DIASTOLIC BLOOD PRESSURE: 87 MMHG | WEIGHT: 114.63 LBS | SYSTOLIC BLOOD PRESSURE: 196 MMHG

## 2024-01-01 DIAGNOSIS — R33.8 BENIGN PROSTATIC HYPERPLASIA WITH URINARY RETENTION: ICD-10-CM

## 2024-01-01 DIAGNOSIS — C34.92 ADENOCARCINOMA OF LUNG, LEFT: ICD-10-CM

## 2024-01-01 DIAGNOSIS — J44.1 COPD EXACERBATION: Primary | ICD-10-CM

## 2024-01-01 DIAGNOSIS — C34.91 ADENOCARCINOMA, LUNG, RIGHT: ICD-10-CM

## 2024-01-01 DIAGNOSIS — C34.91 ADENOCARCINOMA, LUNG, RIGHT: Primary | ICD-10-CM

## 2024-01-01 DIAGNOSIS — R05.9 COUGH, UNSPECIFIED TYPE: Primary | ICD-10-CM

## 2024-01-01 DIAGNOSIS — R33.9 URINARY RETENTION: Primary | ICD-10-CM

## 2024-01-01 DIAGNOSIS — R06.02 SHORTNESS OF BREATH: ICD-10-CM

## 2024-01-01 DIAGNOSIS — C34.90 MALIGNANT NEOPLASM OF UNSPECIFIED PART OF UNSPECIFIED BRONCHUS OR LUNG: ICD-10-CM

## 2024-01-01 DIAGNOSIS — C34.92 ADENOCARCINOMA, LUNG, LEFT: ICD-10-CM

## 2024-01-01 DIAGNOSIS — C34.90 MALIGNANT NEOPLASM OF UNSPECIFIED PART OF UNSPECIFIED BRONCHUS OR LUNG: Primary | ICD-10-CM

## 2024-01-01 DIAGNOSIS — N40.1 BENIGN PROSTATIC HYPERPLASIA WITH URINARY RETENTION: ICD-10-CM

## 2024-01-01 LAB
ALBUMIN SERPL BCP-MCNC: 3.5 G/DL (ref 3.5–5.2)
ALLENS TEST: ABNORMAL
ALLENS TEST: ABNORMAL
ALP SERPL-CCNC: 88 U/L (ref 40–150)
ALT SERPL W/O P-5'-P-CCNC: 7 U/L (ref 10–44)
ANION GAP SERPL CALC-SCNC: 11 MMOL/L (ref 8–16)
ANISOCYTOSIS BLD QL SMEAR: SLIGHT
AST SERPL-CCNC: 11 U/L (ref 10–40)
BASOPHILS # BLD AUTO: 0.02 K/UL (ref 0–0.2)
BASOPHILS NFR BLD: 0.1 % (ref 0–1.9)
BILIRUB SERPL-MCNC: 0.4 MG/DL (ref 0.1–1)
BNP SERPL-MCNC: 60 PG/ML (ref 0–99)
BUN SERPL-MCNC: 22 MG/DL (ref 8–23)
BUN SERPL-MCNC: 23 MG/DL (ref 6–30)
CALCIUM SERPL-MCNC: 9.8 MG/DL (ref 8.7–10.5)
CHLORIDE SERPL-SCNC: 105 MMOL/L (ref 95–110)
CHLORIDE SERPL-SCNC: 105 MMOL/L (ref 95–110)
CO2 SERPL-SCNC: 24 MMOL/L (ref 23–29)
CREAT SERPL-MCNC: 1 MG/DL (ref 0.5–1.4)
CREAT SERPL-MCNC: 1.2 MG/DL (ref 0.5–1.4)
DIFFERENTIAL METHOD BLD: ABNORMAL
EOSINOPHIL # BLD AUTO: 0 K/UL (ref 0–0.5)
EOSINOPHIL NFR BLD: 0 % (ref 0–8)
ERYTHROCYTE [DISTWIDTH] IN BLOOD BY AUTOMATED COUNT: 15.4 % (ref 11.5–14.5)
EST. GFR  (NO RACE VARIABLE): >60 ML/MIN/1.73 M^2
GLUCOSE SERPL-MCNC: 174 MG/DL (ref 70–110)
GLUCOSE SERPL-MCNC: 176 MG/DL (ref 70–110)
HCO3 UR-SCNC: 28.3 MMOL/L (ref 24–28)
HCO3 UR-SCNC: 4.1 MMOL/L (ref 24–28)
HCT VFR BLD AUTO: 42.6 % (ref 40–54)
HCT VFR BLD CALC: 42 %PCV (ref 36–54)
HGB BLD-MCNC: 13.4 G/DL (ref 14–18)
HYPOCHROMIA BLD QL SMEAR: ABNORMAL
IMM GRANULOCYTES # BLD AUTO: 0.03 K/UL (ref 0–0.04)
IMM GRANULOCYTES NFR BLD AUTO: 0.2 % (ref 0–0.5)
INFLUENZA A, MOLECULAR: NEGATIVE
INFLUENZA B, MOLECULAR: NEGATIVE
LYMPHOCYTES # BLD AUTO: 6.2 K/UL (ref 1–4.8)
LYMPHOCYTES NFR BLD: 34.7 % (ref 18–48)
MCH RBC QN AUTO: 29.8 PG (ref 27–31)
MCHC RBC AUTO-ENTMCNC: 31.5 G/DL (ref 32–36)
MCV RBC AUTO: 95 FL (ref 82–98)
MONOCYTES # BLD AUTO: 2 K/UL (ref 0.3–1)
MONOCYTES NFR BLD: 11.4 % (ref 4–15)
NEUTROPHILS # BLD AUTO: 9.6 K/UL (ref 1.8–7.7)
NEUTROPHILS NFR BLD: 53.6 % (ref 38–73)
NRBC BLD-RTO: 0 /100 WBC
OHS QRS DURATION: 116 MS
OHS QRS DURATION: 120 MS
OHS QRS DURATION: 122 MS
OHS QTC CALCULATION: 450 MS
OHS QTC CALCULATION: 475 MS
OHS QTC CALCULATION: 477 MS
PCO2 BLDA: 16.5 MMHG (ref 35–45)
PCO2 BLDA: 58.6 MMHG (ref 35–45)
PH SMN: 7 [PH] (ref 7.35–7.45)
PH SMN: 7.29 [PH] (ref 7.35–7.45)
PLATELET # BLD AUTO: 287 K/UL (ref 150–450)
PLATELET BLD QL SMEAR: ABNORMAL
PMV BLD AUTO: 9.6 FL (ref 9.2–12.9)
PO2 BLDA: 28 MMHG (ref 40–60)
PO2 BLDA: 33 MMHG (ref 40–60)
POC BE: -27 MMOL/L
POC BE: 2 MMOL/L
POC IONIZED CALCIUM: 1.28 MMOL/L (ref 1.06–1.42)
POC SATURATED O2: 29 % (ref 95–100)
POC SATURATED O2: 54 % (ref 95–100)
POC TCO2 (MEASURED): 27 MMOL/L (ref 23–27)
POC TCO2: 30 MMOL/L (ref 24–29)
POC TCO2: <5 MMOL/L (ref 24–29)
POTASSIUM BLD-SCNC: 3.9 MMOL/L (ref 3.5–5.1)
POTASSIUM SERPL-SCNC: 4 MMOL/L (ref 3.5–5.1)
PROT SERPL-MCNC: 7.5 G/DL (ref 6–8.4)
RBC # BLD AUTO: 4.5 M/UL (ref 4.6–6.2)
SAMPLE: ABNORMAL
SITE: ABNORMAL
SITE: ABNORMAL
SODIUM BLD-SCNC: 140 MMOL/L (ref 136–145)
SODIUM SERPL-SCNC: 140 MMOL/L (ref 136–145)
SPECIMEN SOURCE: NORMAL
TROPONIN I SERPL DL<=0.01 NG/ML-MCNC: 0.03 NG/ML (ref 0–0.03)
WBC # BLD AUTO: 17.79 K/UL (ref 3.9–12.7)

## 2024-01-01 PROCEDURE — 96365 THER/PROPH/DIAG IV INF INIT: CPT

## 2024-01-01 PROCEDURE — G2211 COMPLEX E/M VISIT ADD ON: HCPCS | Mod: S$GLB,,, | Performed by: INTERNAL MEDICINE

## 2024-01-01 PROCEDURE — 80053 COMPREHEN METABOLIC PANEL: CPT | Performed by: STUDENT IN AN ORGANIZED HEALTH CARE EDUCATION/TRAINING PROGRAM

## 2024-01-01 PROCEDURE — 99900035 HC TECH TIME PER 15 MIN (STAT)

## 2024-01-01 PROCEDURE — 63600175 PHARM REV CODE 636 W HCPCS: Performed by: STUDENT IN AN ORGANIZED HEALTH CARE EDUCATION/TRAINING PROGRAM

## 2024-01-01 PROCEDURE — 3288F FALL RISK ASSESSMENT DOCD: CPT | Mod: CPTII,S$GLB,, | Performed by: STUDENT IN AN ORGANIZED HEALTH CARE EDUCATION/TRAINING PROGRAM

## 2024-01-01 PROCEDURE — 94660 CPAP INITIATION&MGMT: CPT

## 2024-01-01 PROCEDURE — 1126F AMNT PAIN NOTED NONE PRSNT: CPT | Mod: CPTII,S$GLB,, | Performed by: STUDENT IN AN ORGANIZED HEALTH CARE EDUCATION/TRAINING PROGRAM

## 2024-01-01 PROCEDURE — 70470 CT HEAD/BRAIN W/O & W/DYE: CPT | Mod: 26,,, | Performed by: RADIOLOGY

## 2024-01-01 PROCEDURE — 99214 OFFICE O/P EST MOD 30 MIN: CPT | Mod: S$GLB,,, | Performed by: NURSE PRACTITIONER

## 2024-01-01 PROCEDURE — 84484 ASSAY OF TROPONIN QUANT: CPT | Performed by: STUDENT IN AN ORGANIZED HEALTH CARE EDUCATION/TRAINING PROGRAM

## 2024-01-01 PROCEDURE — 3077F SYST BP >= 140 MM HG: CPT | Mod: CPTII,S$GLB,, | Performed by: INTERNAL MEDICINE

## 2024-01-01 PROCEDURE — 3288F FALL RISK ASSESSMENT DOCD: CPT | Mod: CPTII,S$GLB,, | Performed by: NURSE PRACTITIONER

## 2024-01-01 PROCEDURE — 3077F SYST BP >= 140 MM HG: CPT | Mod: CPTII,S$GLB,, | Performed by: STUDENT IN AN ORGANIZED HEALTH CARE EDUCATION/TRAINING PROGRAM

## 2024-01-01 PROCEDURE — 96375 TX/PRO/DX INJ NEW DRUG ADDON: CPT

## 2024-01-01 PROCEDURE — 82803 BLOOD GASES ANY COMBINATION: CPT

## 2024-01-01 PROCEDURE — 83880 ASSAY OF NATRIURETIC PEPTIDE: CPT | Performed by: STUDENT IN AN ORGANIZED HEALTH CARE EDUCATION/TRAINING PROGRAM

## 2024-01-01 PROCEDURE — 99999 PR PBB SHADOW E&M-EST. PATIENT-LVL IV: CPT | Mod: PBBFAC,,, | Performed by: INTERNAL MEDICINE

## 2024-01-01 PROCEDURE — 1101F PT FALLS ASSESS-DOCD LE1/YR: CPT | Mod: CPTII,S$GLB,, | Performed by: STUDENT IN AN ORGANIZED HEALTH CARE EDUCATION/TRAINING PROGRAM

## 2024-01-01 PROCEDURE — 1101F PT FALLS ASSESS-DOCD LE1/YR: CPT | Mod: CPTII,S$GLB,, | Performed by: NURSE PRACTITIONER

## 2024-01-01 PROCEDURE — 1101F PT FALLS ASSESS-DOCD LE1/YR: CPT | Mod: CPTII,S$GLB,, | Performed by: INTERNAL MEDICINE

## 2024-01-01 PROCEDURE — 99205 OFFICE O/P NEW HI 60 MIN: CPT | Mod: S$GLB,,, | Performed by: STUDENT IN AN ORGANIZED HEALTH CARE EDUCATION/TRAINING PROGRAM

## 2024-01-01 PROCEDURE — 85025 COMPLETE CBC W/AUTO DIFF WBC: CPT | Performed by: STUDENT IN AN ORGANIZED HEALTH CARE EDUCATION/TRAINING PROGRAM

## 2024-01-01 PROCEDURE — 3079F DIAST BP 80-89 MM HG: CPT | Mod: CPTII,S$GLB,, | Performed by: STUDENT IN AN ORGANIZED HEALTH CARE EDUCATION/TRAINING PROGRAM

## 2024-01-01 PROCEDURE — 1160F RVW MEDS BY RX/DR IN RCRD: CPT | Mod: CPTII,S$GLB,, | Performed by: NURSE PRACTITIONER

## 2024-01-01 PROCEDURE — 1159F MED LIST DOCD IN RCRD: CPT | Mod: CPTII,S$GLB,, | Performed by: STUDENT IN AN ORGANIZED HEALTH CARE EDUCATION/TRAINING PROGRAM

## 2024-01-01 PROCEDURE — 1126F AMNT PAIN NOTED NONE PRSNT: CPT | Mod: CPTII,S$GLB,, | Performed by: INTERNAL MEDICINE

## 2024-01-01 PROCEDURE — 99205 OFFICE O/P NEW HI 60 MIN: CPT | Mod: S$GLB,,, | Performed by: INTERNAL MEDICINE

## 2024-01-01 PROCEDURE — 3078F DIAST BP <80 MM HG: CPT | Mod: CPTII,S$GLB,, | Performed by: INTERNAL MEDICINE

## 2024-01-01 PROCEDURE — 1111F DSCHRG MED/CURRENT MED MERGE: CPT | Mod: CPTII,S$GLB,, | Performed by: NURSE PRACTITIONER

## 2024-01-01 PROCEDURE — 99285 EMERGENCY DEPT VISIT HI MDM: CPT | Mod: 25

## 2024-01-01 PROCEDURE — 70470 CT HEAD/BRAIN W/O & W/DYE: CPT | Mod: TC

## 2024-01-01 PROCEDURE — 99999 PR PBB SHADOW E&M-EST. PATIENT-LVL IV: CPT | Mod: PBBFAC,,, | Performed by: NURSE PRACTITIONER

## 2024-01-01 PROCEDURE — 25500020 PHARM REV CODE 255: Performed by: STUDENT IN AN ORGANIZED HEALTH CARE EDUCATION/TRAINING PROGRAM

## 2024-01-01 PROCEDURE — 80047 BASIC METABLC PNL IONIZED CA: CPT | Mod: XB

## 2024-01-01 PROCEDURE — 93010 ELECTROCARDIOGRAM REPORT: CPT | Mod: ,,, | Performed by: INTERNAL MEDICINE

## 2024-01-01 PROCEDURE — 27000190 HC CPAP FULL FACE MASK W/VALVE

## 2024-01-01 PROCEDURE — 27100171 HC OXYGEN HIGH FLOW UP TO 24 HOURS

## 2024-01-01 PROCEDURE — 94761 N-INVAS EAR/PLS OXIMETRY MLT: CPT | Mod: XB

## 2024-01-01 PROCEDURE — 99999 PR PBB SHADOW E&M-EST. PATIENT-LVL IV: CPT | Mod: PBBFAC,,, | Performed by: STUDENT IN AN ORGANIZED HEALTH CARE EDUCATION/TRAINING PROGRAM

## 2024-01-01 PROCEDURE — 87502 INFLUENZA DNA AMP PROBE: CPT | Performed by: STUDENT IN AN ORGANIZED HEALTH CARE EDUCATION/TRAINING PROGRAM

## 2024-01-01 PROCEDURE — 25000003 PHARM REV CODE 250: Performed by: STUDENT IN AN ORGANIZED HEALTH CARE EDUCATION/TRAINING PROGRAM

## 2024-01-01 PROCEDURE — 25000242 PHARM REV CODE 250 ALT 637 W/ HCPCS: Performed by: STUDENT IN AN ORGANIZED HEALTH CARE EDUCATION/TRAINING PROGRAM

## 2024-01-01 PROCEDURE — 1159F MED LIST DOCD IN RCRD: CPT | Mod: CPTII,S$GLB,, | Performed by: NURSE PRACTITIONER

## 2024-01-01 PROCEDURE — 93005 ELECTROCARDIOGRAM TRACING: CPT

## 2024-01-01 PROCEDURE — 3078F DIAST BP <80 MM HG: CPT | Mod: CPTII,S$GLB,, | Performed by: NURSE PRACTITIONER

## 2024-01-01 PROCEDURE — 1126F AMNT PAIN NOTED NONE PRSNT: CPT | Mod: CPTII,S$GLB,, | Performed by: NURSE PRACTITIONER

## 2024-01-01 PROCEDURE — 3288F FALL RISK ASSESSMENT DOCD: CPT | Mod: CPTII,S$GLB,, | Performed by: INTERNAL MEDICINE

## 2024-01-01 PROCEDURE — 94640 AIRWAY INHALATION TREATMENT: CPT

## 2024-01-01 PROCEDURE — 63600175 PHARM REV CODE 636 W HCPCS

## 2024-01-01 PROCEDURE — 3074F SYST BP LT 130 MM HG: CPT | Mod: CPTII,S$GLB,, | Performed by: NURSE PRACTITIONER

## 2024-01-01 RX ORDER — NALOXONE HYDROCHLORIDE 1 MG/ML
2 INJECTION INTRAMUSCULAR; INTRAVENOUS; SUBCUTANEOUS
Status: DISCONTINUED | OUTPATIENT
Start: 2024-01-01 | End: 2024-01-01 | Stop reason: HOSPADM

## 2024-01-01 RX ORDER — NALOXONE HYDROCHLORIDE 1 MG/ML
INJECTION INTRAMUSCULAR; INTRAVENOUS; SUBCUTANEOUS
Status: COMPLETED
Start: 2024-01-01 | End: 2024-01-01

## 2024-01-01 RX ORDER — ONDANSETRON HYDROCHLORIDE 2 MG/ML
4 INJECTION, SOLUTION INTRAVENOUS
Status: COMPLETED | OUTPATIENT
Start: 2024-01-01 | End: 2024-01-01

## 2024-01-01 RX ORDER — TAMSULOSIN HYDROCHLORIDE 0.4 MG/1
0.4 CAPSULE ORAL NIGHTLY
Qty: 90 CAPSULE | Refills: 3 | Status: SHIPPED | OUTPATIENT
Start: 2024-01-01 | End: 2025-01-09

## 2024-01-01 RX ORDER — METHYLPREDNISOLONE SOD SUCC 125 MG
125 VIAL (EA) INJECTION
Status: COMPLETED | OUTPATIENT
Start: 2024-01-01 | End: 2024-01-01

## 2024-01-01 RX ORDER — BENZONATATE 200 MG/1
200 CAPSULE ORAL 3 TIMES DAILY PRN
Qty: 90 CAPSULE | Refills: 3 | Status: SHIPPED | OUTPATIENT
Start: 2024-01-01 | End: 2024-10-27

## 2024-01-01 RX ORDER — MORPHINE SULFATE 4 MG/ML
4 INJECTION, SOLUTION INTRAMUSCULAR; INTRAVENOUS
Status: COMPLETED | OUTPATIENT
Start: 2024-01-01 | End: 2024-01-01

## 2024-01-01 RX ORDER — IPRATROPIUM BROMIDE AND ALBUTEROL SULFATE 2.5; .5 MG/3ML; MG/3ML
3 SOLUTION RESPIRATORY (INHALATION)
Status: COMPLETED | OUTPATIENT
Start: 2024-01-01 | End: 2024-01-01

## 2024-01-01 RX ADMIN — SODIUM CHLORIDE, POTASSIUM CHLORIDE, SODIUM LACTATE AND CALCIUM CHLORIDE 1000 ML: 600; 310; 30; 20 INJECTION, SOLUTION INTRAVENOUS at 02:10

## 2024-01-01 RX ADMIN — NALOXONE HYDROCHLORIDE 2 MG: 1 INJECTION INTRAMUSCULAR; INTRAVENOUS; SUBCUTANEOUS at 03:10

## 2024-01-01 RX ADMIN — IPRATROPIUM BROMIDE AND ALBUTEROL SULFATE 3 ML: 2.5; .5 SOLUTION RESPIRATORY (INHALATION) at 02:10

## 2024-01-01 RX ADMIN — ONDANSETRON 4 MG: 2 INJECTION INTRAMUSCULAR; INTRAVENOUS at 03:10

## 2024-01-01 RX ADMIN — AZITHROMYCIN MONOHYDRATE 500 MG: 500 INJECTION, POWDER, LYOPHILIZED, FOR SOLUTION INTRAVENOUS at 02:10

## 2024-01-01 RX ADMIN — IPRATROPIUM BROMIDE AND ALBUTEROL SULFATE 3 ML: 2.5; .5 SOLUTION RESPIRATORY (INHALATION) at 01:10

## 2024-01-01 RX ADMIN — MORPHINE SULFATE 4 MG: 4 INJECTION INTRAVENOUS at 03:10

## 2024-01-01 RX ADMIN — METHYLPREDNISOLONE SODIUM SUCCINATE 125 MG: 125 INJECTION, POWDER, FOR SOLUTION INTRAMUSCULAR; INTRAVENOUS at 02:10

## 2024-01-01 RX ADMIN — IOHEXOL 75 ML: 350 INJECTION, SOLUTION INTRAVENOUS at 01:10

## 2024-01-01 RX ADMIN — NALOXONE HYDROCHLORIDE 2 MG: 1 INJECTION PARENTERAL at 03:10

## 2024-01-10 ENCOUNTER — PATIENT MESSAGE (OUTPATIENT)
Dept: UROLOGY | Facility: CLINIC | Age: 79
End: 2024-01-10

## 2024-01-10 NOTE — PROGRESS NOTES
Subjective:       Patient ID: Francois Otero Sr. is a 79 y.o. male.    Chief Complaint: Urinary Retention    Patient is new to me. He is a 77 yo WM who is here today for a hospital follow-up for urinary retention and voiding trial for jennings catheter removal.     Follow-up  This is a new (urinary retention) problem. Episode onset: 12/26/2023. The problem occurs daily. The problem has been unchanged. Pertinent negatives include no abdominal pain, change in bowel habit, fever, nausea, swollen glands or vomiting. Nothing aggravates the symptoms. Treatments tried: jennings catheter. The treatment provided significant relief.     Review of Systems   Constitutional:  Negative for fever.   Gastrointestinal:  Negative for abdominal pain, change in bowel habit, nausea and vomiting.   Genitourinary:  Positive for difficulty urinating (jennings catheter in place). Negative for decreased urine volume, hematuria, penile pain, penile swelling, scrotal swelling and testicular pain.         Objective:      Physical Exam  Vitals and nursing note reviewed.   Constitutional:       General: He is not in acute distress.     Appearance: He is well-developed and normal weight. He is not ill-appearing.   HENT:      Head: Normocephalic and atraumatic.   Eyes:      Pupils: Pupils are equal, round, and reactive to light.   Cardiovascular:      Rate and Rhythm: Normal rate.   Pulmonary:      Effort: Pulmonary effort is normal. No respiratory distress.   Abdominal:      Palpations: Abdomen is soft.      Tenderness: There is no abdominal tenderness.   Musculoskeletal:         General: Normal range of motion.      Cervical back: Normal range of motion.   Skin:     General: Skin is warm and dry.   Neurological:      Mental Status: He is alert and oriented to person, place, and time.      Coordination: Coordination normal.   Psychiatric:         Mood and Affect: Mood normal.         Behavior: Behavior normal.         Thought Content: Thought content  normal.         Judgment: Judgment normal.         Assessment:       Problem List Items Addressed This Visit       Urinary retention - Primary    Relevant Orders    Jennings catheter - discontinue (Completed)     Other Visit Diagnoses       Benign prostatic hyperplasia with urinary retention        Relevant Medications    tamsulosin (FLOMAX) 0.4 mg Cap    Other Relevant Orders    PSA, Total and Free (Completed)    Jennings catheter - discontinue (Completed)            Plan:           Francois was seen today for urinary retention.    Diagnoses and all orders for this visit:    Urinary retention  -     Jennings catheter - discontinue    Benign prostatic hyperplasia with urinary retention  -     PSA, Total and Free; Future  -     Jennings catheter - discontinue  -     tamsulosin (FLOMAX) 0.4 mg Cap; Take 1 capsule (0.4 mg total) by mouth every evening.    Other orders  Voiding trial for jennigns catheter removal.  Ok to remove jennings catheter (passed voiding trial).   Start taking tamsulosin (Flomax) 0.4 mg every evening instead of every morning for BPH.   Continue taking finasteride (Proscar) 5 mg daily for BPH.  PSA, total and free today  DAYANARA today    Follow-up in 4 weeks with a full bladder for pre and post-void bladder scan.     Amie Danielle, DNP

## 2024-01-10 NOTE — PATIENT INSTRUCTIONS
Voiding trial for jennings catheter removal.  Ok to remove jennings catheter (passed voiding trial).   Start taking tamsulosin (Flomax) 0.4 mg every evening instead of every morning for BPH.   Continue taking finasteride (Proscar) 5 mg daily for BPH.  PSA, total and free today  DAYANARA today  Follow-up in 4 weeks with a full bladder for pre and post-void bladder scan.

## 2024-01-11 ENCOUNTER — PATIENT MESSAGE (OUTPATIENT)
Dept: UROLOGY | Facility: CLINIC | Age: 79
End: 2024-01-11
Payer: MEDICARE

## 2024-01-11 NOTE — TELEPHONE ENCOUNTER
Called patient to discuss he issues, patient states at around 3 in the morning he couldn't urinate and he said it was painful at a pain level of 9, he states at around 4:30AM he used the urinal and it got up to 300ccs, around 8Am he states he urinated again and it was 300ccs. After a while he said he had to urinate but it wasn't strong stream of urine.    Spoke to NP Courtney Danielle regarding his concerns and she offered him to come today for a bladder scan but he stated he did not have a ride so she stated for him to take flomax twice daily, one in the morning and one at night, he understood

## 2024-01-17 ENCOUNTER — TELEPHONE (OUTPATIENT)
Dept: UROLOGY | Facility: CLINIC | Age: 79
End: 2024-01-17
Payer: MEDICARE

## 2024-01-17 NOTE — TELEPHONE ENCOUNTER
REYNOLD:  Spoke to patient and results given, he instructed me to call his daughter  regarding results. I spoke to daughter and went over all of his results and the recommendation, notified her that the next available date that patient can get his biopsy done on January 30th, she stated she will talk to her brother to see if that is a good date and she will contact us back to confirm, when she does call back we will go over further details regarding procedure.

## 2024-01-17 NOTE — TELEPHONE ENCOUNTER
----- Message from Teresita Orellana sent at 1/17/2024 12:02 PM CST -----  Contact: cherelle  Type:  Patient Returning Call    Who Called:cherelle  Who Left Message for Patient:Lashanda Page MA  Does the patient know what this is regarding?:returning call  Would the patient rather a call back or a response via [facility]? call  Best Call Back Number:[phone]  Additional Information:

## 2024-01-17 NOTE — TELEPHONE ENCOUNTER
----- Message from Amie Danielle NP sent at 1/12/2024  4:25 PM CST -----  Please inform patient his PSA is elevated at 11.4 ng/mL with free PSA at 10.53%. An outpatient prostate biopsy is recommended at this time due to elevated PSA.  1. Schedule patient for prostate biopsy with Dr. Muse.   2. Pre-op labs needed (CBC, BMP, U/A, and urine cx)  3. Provide patient with bowel prep instructions.  4. Temporarily hold aspirin 7-10 days prior to surgery to reduce risk of bleeding.   5. After sx patient can expect blood in his semen, urine, and/or stool.  6. Follow-up post op.   #If patient has any questions please let me know. Thanks.

## 2024-01-17 NOTE — TELEPHONE ENCOUNTER
Spoke to patient daughter and she wants to set up a visit with Courtney or  to discuss biopsy. She agreed to telephone visit with Courtney.

## 2024-01-23 NOTE — PROGRESS NOTES
Pt here for voiding trial. Instructed patient regarding procedure. Pt reclined on exam table. Placed 190cc of Sterile water into bladder via gravity using sterile 60cc syringe when patient stated need to void.  10cc of sterile water removed from  catheter balloon and 16fr silicone jennings removed. Pt helped to stand to void but was unable. Stated that he also had to have a bowel movement. Helped to bathroom where he was able to [id number] .Pt tolerated well.

## 2024-02-07 ENCOUNTER — OFFICE VISIT (OUTPATIENT)
Dept: UROLOGY | Facility: CLINIC | Age: 79
End: 2024-02-07
Payer: MEDICARE

## 2024-02-07 VITALS
SYSTOLIC BLOOD PRESSURE: 127 MMHG | BODY MASS INDEX: 20.61 KG/M2 | HEIGHT: 62 IN | DIASTOLIC BLOOD PRESSURE: 53 MMHG | HEART RATE: 88 BPM | WEIGHT: 112 LBS

## 2024-02-07 DIAGNOSIS — R30.0 DYSURIA: ICD-10-CM

## 2024-02-07 DIAGNOSIS — R33.8 BENIGN PROSTATIC HYPERPLASIA WITH URINARY RETENTION: ICD-10-CM

## 2024-02-07 DIAGNOSIS — N40.1 BENIGN PROSTATIC HYPERPLASIA WITH URINARY RETENTION: ICD-10-CM

## 2024-02-07 DIAGNOSIS — Z09 FOLLOW-UP EXAM AFTER TREATMENT: ICD-10-CM

## 2024-02-07 DIAGNOSIS — R33.9 URINARY RETENTION: Primary | ICD-10-CM

## 2024-02-07 DIAGNOSIS — R97.20 ELEVATED PSA, BETWEEN 10 AND LESS THAN 20 NG/ML: ICD-10-CM

## 2024-02-07 LAB
BACTERIA #/AREA URNS AUTO: ABNORMAL /HPF
BILIRUB UR QL STRIP: NEGATIVE
CLARITY UR REFRACT.AUTO: CLEAR
COLOR UR AUTO: YELLOW
GLUCOSE UR QL STRIP: NEGATIVE
HGB UR QL STRIP: NEGATIVE
KETONES UR QL STRIP: NEGATIVE
LEUKOCYTE ESTERASE UR QL STRIP: ABNORMAL
MICROSCOPIC COMMENT: ABNORMAL
NITRITE UR QL STRIP: NEGATIVE
PH UR STRIP: 6 [PH] (ref 5–8)
POC RESIDUAL URINE VOLUME: 110 ML (ref 0–100)
POC RESIDUAL URINE VOLUME: 29 ML (ref 0–100)
PROT UR QL STRIP: ABNORMAL
RBC #/AREA URNS AUTO: 3 /HPF (ref 0–4)
SP GR UR STRIP: 1.02 (ref 1–1.03)
URN SPEC COLLECT METH UR: ABNORMAL
WBC #/AREA URNS AUTO: 10 /HPF (ref 0–5)

## 2024-02-07 PROCEDURE — 87086 URINE CULTURE/COLONY COUNT: CPT | Performed by: NURSE PRACTITIONER

## 2024-02-07 PROCEDURE — 51798 US URINE CAPACITY MEASURE: CPT | Mod: S$GLB,,, | Performed by: NURSE PRACTITIONER

## 2024-02-07 PROCEDURE — 99214 OFFICE O/P EST MOD 30 MIN: CPT | Mod: S$GLB,,, | Performed by: NURSE PRACTITIONER

## 2024-02-07 PROCEDURE — 1126F AMNT PAIN NOTED NONE PRSNT: CPT | Mod: CPTII,S$GLB,, | Performed by: NURSE PRACTITIONER

## 2024-02-07 PROCEDURE — 99999 PR PBB SHADOW E&M-EST. PATIENT-LVL IV: CPT | Mod: PBBFAC,,, | Performed by: NURSE PRACTITIONER

## 2024-02-07 PROCEDURE — 87088 URINE BACTERIA CULTURE: CPT | Performed by: NURSE PRACTITIONER

## 2024-02-07 PROCEDURE — 3078F DIAST BP <80 MM HG: CPT | Mod: CPTII,S$GLB,, | Performed by: NURSE PRACTITIONER

## 2024-02-07 PROCEDURE — 3074F SYST BP LT 130 MM HG: CPT | Mod: CPTII,S$GLB,, | Performed by: NURSE PRACTITIONER

## 2024-02-07 PROCEDURE — 1159F MED LIST DOCD IN RCRD: CPT | Mod: CPTII,S$GLB,, | Performed by: NURSE PRACTITIONER

## 2024-02-07 PROCEDURE — 3288F FALL RISK ASSESSMENT DOCD: CPT | Mod: CPTII,S$GLB,, | Performed by: NURSE PRACTITIONER

## 2024-02-07 PROCEDURE — 81001 URINALYSIS AUTO W/SCOPE: CPT | Performed by: NURSE PRACTITIONER

## 2024-02-07 PROCEDURE — 1101F PT FALLS ASSESS-DOCD LE1/YR: CPT | Mod: CPTII,S$GLB,, | Performed by: NURSE PRACTITIONER

## 2024-02-07 PROCEDURE — 1160F RVW MEDS BY RX/DR IN RCRD: CPT | Mod: CPTII,S$GLB,, | Performed by: NURSE PRACTITIONER

## 2024-02-07 NOTE — PATIENT INSTRUCTIONS
MRI prostate with and without contrast with serum creatinine prior.   U/A and urine cx today  Pre and post-void bladder scan.   Continue taking tamsulosin (Flomax) 0.4 mg every evening for BPH.   Continue taking finasteride (Proscar) 5 mg daily for BPH.  Follow-up pending prostate MRI.

## 2024-02-07 NOTE — PROGRESS NOTES
Subjective:       Patient ID: Francois Otero Sr. is a 78 y.o. male.    Chief Complaint: Follow-up    Patient is her today for a 4 week follow-up since having jennings catheter removed. He is currently taking tamsulosin 0.4 mg every evening and finasteride 5 mg daily for BPH. Patient reports doing well and denies difficulty voiding. Patient's most recent PSA was elevated at 11.4 ng/mL on 1/10/2024. Results re-discussed with patient and his daughter and the need for a prostate biopsy at this time. Patient does not want to move forward with prostate biopsy at this time. I discussed prostate MRI with patient and he agreed to move forward with that test at this time. Patient has a cardiac stent, but the card shows it is MRI compatible.     Cardiac stent 3.0 mm x 23 mm  Ref [id number]  Lot [id number]  Implanted 7/28/2009 in [place], [place] at [facility]  Dr. Laurent GIBSON safe.     Follow-up  This is a chronic (BPH with urinary retention) problem. The problem occurs daily. The problem has been gradually improving. Associated symptoms include urinary symptoms. Pertinent negatives include no abdominal pain, change in bowel habit, chills, fever, headaches, nausea, swollen glands, vomiting or weakness. Nothing aggravates the symptoms. Treatments tried: tamsulosin and finasteride. The treatment provided significant relief.     Review of Systems   Constitutional:  Negative for appetite change, chills and fever.   Gastrointestinal:  Negative for abdominal pain, change in bowel habit, constipation, diarrhea, nausea and vomiting.   Genitourinary:  Positive for dysuria (intermittent). Negative for decreased urine volume, difficulty urinating, discharge, flank pain, frequency, hematuria, penile pain, penile swelling, scrotal swelling, testicular pain and urgency.        Nocturia   Neurological:  Negative for dizziness, weakness and headaches.   Psychiatric/Behavioral: Negative.           Objective:      Physical Exam  Vitals and nursing note  reviewed.   Constitutional:       General: He is not in acute distress.     Appearance: He is well-developed. He is not ill-appearing.   HENT:      Head: Normocephalic and atraumatic.   Eyes:      Pupils: Pupils are equal, round, and reactive to light.   Cardiovascular:      Rate and Rhythm: Normal rate.   Pulmonary:      Effort: Pulmonary effort is normal. No respiratory distress.      Comments: Currently on portable oxygen.   Abdominal:      Palpations: Abdomen is soft.      Tenderness: There is no abdominal tenderness.   Genitourinary:     Comments: Pre-void bladder scan = 110 mL  Post-void bladder scan = 29 mL  Musculoskeletal:         General: Normal range of motion.      Cervical back: Normal range of motion.   Skin:     General: Skin is warm and dry.   Neurological:      Mental Status: He is alert and oriented to person, place, and time.      Coordination: Coordination normal.   Psychiatric:         Mood and Affect: Mood normal.         Behavior: Behavior normal.         Thought Content: Thought content normal.         Judgment: Judgment normal.         Assessment:       Problem List Items Addressed This Visit          Renal/    Urinary retention - Primary    Relevant Orders    POCT Bladder Scan (Completed)     Other Visit Diagnoses       Benign prostatic hyperplasia with urinary retention        Relevant Orders    POCT Bladder Scan (Completed)    Elevated PSA, between 10 and less than 20 ng/ml        Relevant Orders    MRI Prostate W W/O Contrast    Creatinine, Serum (Completed)    Follow-up exam after treatment        Dysuria        Relevant Orders    Urine culture    Urinalysis            Plan:           Francois was seen today for follow-up.    Diagnoses and all orders for this visit:    Urinary retention  -     POCT Bladder Scan    Benign prostatic hyperplasia with urinary retention  -     POCT Bladder Scan    Elevated PSA, between 10 and less than 20 ng/ml  -     MRI Prostate W W/O Contrast; Future  -      Creatinine, Serum; Future    Follow-up exam after treatment    Dysuria  -     Urine culture  -     Urinalysis    Other orders  Continue taking tamsulosin (Flomax) 0.4 mg every evening for BPH.   Continue taking finasteride (Proscar) 5 mg daily for BPH.    Follow-up pending prostate MRI.    Amie Danielle, DNP

## 2024-02-08 LAB — BACTERIA UR CULT: ABNORMAL

## 2024-02-09 ENCOUNTER — TELEPHONE (OUTPATIENT)
Dept: UROLOGY | Facility: CLINIC | Age: 79
End: 2024-02-09
Payer: MEDICARE

## 2024-02-09 DIAGNOSIS — N30.00 ACUTE CYSTITIS WITHOUT HEMATURIA: Primary | ICD-10-CM

## 2024-02-09 RX ORDER — CLINDAMYCIN HYDROCHLORIDE 300 MG/1
300 CAPSULE ORAL 3 TIMES DAILY
Qty: 21 CAPSULE | Refills: 0 | Status: SHIPPED | OUTPATIENT
Start: 2024-02-09 | End: 2024-02-16

## 2024-02-09 NOTE — TELEPHONE ENCOUNTER
Pt informed of result note via phone call     ----- Message from Amie Danielle NP sent at 2/9/2024  2:08 PM CST -----  Please inform patient via telephone that his urine cx came back positive for a UTI. [facility]

## 2024-02-23 ENCOUNTER — TELEPHONE (OUTPATIENT)
Dept: UROLOGY | Facility: CLINIC | Age: 79
End: 2024-02-23
Payer: MEDICARE

## 2024-02-23 NOTE — TELEPHONE ENCOUNTER
----- Message from Teresita Orellana sent at 2/23/2024  9:23 AM CST -----  Contact: cherelle  Type:  Needs Medical Advice    Who Called: Cherelle   Would the patient rather a call back or a response via [facility]? Call   Best Call Back Number: [phone]   Additional Information:   Cherelle requesting a call to see if pt still have to take Clindamycin medication

## 2024-02-26 ENCOUNTER — HOSPITAL ENCOUNTER (OUTPATIENT)
Dept: RADIOLOGY | Facility: HOSPITAL | Age: 79
Discharge: HOME | End: 2024-02-26
Attending: NURSE PRACTITIONER
Payer: MEDICARE

## 2024-02-26 DIAGNOSIS — R91.1 SOLITARY PULMONARY NODULE: ICD-10-CM

## 2024-02-26 PROCEDURE — 71250 CT THORAX DX C-: CPT | Mod: TC,PO

## 2024-02-26 PROCEDURE — 71250 CT THORAX DX C-: CPT | Mod: 26,,, | Performed by: RADIOLOGY

## 2024-03-11 ENCOUNTER — HOSPITAL ENCOUNTER (OUTPATIENT)
Dept: RADIOLOGY | Facility: HOSPITAL | Age: 79
Discharge: HOME | End: 2024-03-11
Attending: NURSE PRACTITIONER
Payer: MEDICARE

## 2024-03-11 DIAGNOSIS — R91.1 PULMONARY NODULE 1 CM OR GREATER IN DIAMETER: ICD-10-CM

## 2024-03-11 LAB — GLUCOSE SERPL-MCNC: 123 MG/DL (ref 70–110)

## 2024-03-11 PROCEDURE — 78815 PET IMAGE W/CT SKULL-THIGH: CPT | Mod: 26,PS,, | Performed by: RADIOLOGY

## 2024-03-11 PROCEDURE — 78815 PET IMAGE W/CT SKULL-THIGH: CPT | Mod: TC,PS,PN

## 2024-03-11 PROCEDURE — A9552 F18 FDG: HCPCS | Mod: PN | Performed by: NURSE PRACTITIONER

## 2024-03-11 RX ORDER — FLUDEOXYGLUCOSE F18 500 MCI/ML
12 INJECTION INTRAVENOUS
Status: COMPLETED | OUTPATIENT
Start: 2024-03-11 | End: 2024-03-11

## 2024-03-11 RX ADMIN — FLUDEOXYGLUCOSE F-18 12.2 MILLICURIE: 500 INJECTION INTRAVENOUS at 11:03

## 2024-03-11 NOTE — PROGRESS NOTES
PET Imaging Questionnaire    Are you a Diabetic? Recent Blood Sugar level? No    Are you anemic? Bone Marrow Stimulation Meds? No    Have you had a CT Scan, if so when & where was your last one? Yes -     Have you had a PET Scan, if so when & where was your last one? Yes -     Chemotherapy or currently on Chemotherapy? No    Radiation therapy? No    Surgical History:   Past Surgical History:   Procedure Laterality Date    by pass on leg       CARDIAC CATHETERIZATION      cardiac stents       COLONOSCOPY N/A 9/14/2017    Procedure: COLONOSCOPY;  Surgeon: Robb Yepez MD;  [facility]: [facility];  Service: Endoscopy;  Laterality: N/A; repeat in 5 years    UPPER GASTROINTESTINAL ENDOSCOPY  09/14/2017    Dr. Yepez        Have you been fasting for at least 6 hours? Yes    Is there any chance you may be pregnant or breastfeeding? No    Assay: 13.08 MCi@:11.13   Injection Site:rt arm     Residual: .86 mCi@: 11.15   Technologist: Vane Ryan Injected:12.2mCi

## 2024-03-18 PROBLEM — F17.201 TOBACCO ABUSE, IN REMISSION: Status: ACTIVE | Noted: 2024-03-18

## 2024-03-18 PROBLEM — R94.2 ABNORMAL PET SCAN OF LUNG: Status: ACTIVE | Noted: 2024-03-18

## 2024-04-26 ENCOUNTER — TELEPHONE (OUTPATIENT)
Dept: UROLOGY | Facility: CLINIC | Age: 79
End: 2024-04-26
Payer: MEDICARE

## 2024-04-26 DIAGNOSIS — N30.00 ACUTE CYSTITIS WITHOUT HEMATURIA: ICD-10-CM

## 2024-04-26 DIAGNOSIS — R97.20 ELEVATED PSA, BETWEEN 10 AND LESS THAN 20 NG/ML: Primary | ICD-10-CM

## 2024-04-26 NOTE — TELEPHONE ENCOUNTER
Pt daughter says that they will just walk in to lab to have test done    ----- Message from Amie Danielle NP sent at 4/26/2024  9:21 AM CDT -----  Regarding: RE: Jennifer jesus daughter [phone]  Contact: Jennifer jesus daughter [phone]  Radiologist didn't mention any specific findings related to  system. I would like patient to repeat urine cx (clean catch, mid-stream urine specimen needed) and PSA. Orders placed.  ----- Message -----  From: Germania Loja MA  Sent: 4/25/2024   4:12 PM CDT  To: Amie Danielle NP  Subject: FW: Jennifer jesus daughter [phone]         Pt wants to know if you can review Dr Resendiz NM Pet scan and review the bladder portion. Pt did not get MRI done due to cost and anxiety. How would you like me to respond?  ----- Message -----  From: Amie Danielle NP  Sent: 4/25/2024   3:56 PM CDT  To: Germania Loja MA  Subject: RE: Jennifer jesus daughter [phone]         Yes.  ----- Message -----  From: Germania Loja MA  Sent: 4/25/2024   2:49 PM CDT  To: Amie Danielle NP  Subject: FW: Jennifer jesus daughter [phone]         Hey, it looks like you ordered this MRI back in Feb 2024 but he did not have it done. Can he use the same order?  ----- Message -----  From: Jennifer Ahmadi  Sent: 4/25/2024   2:40 PM CDT  To: Almaz GAYTAN Staff  Subject: Jennifer jesus daughter [phone]             Who Called: Jennifer jesus daughter [phone]    Calling to talk to nurse in regards to getting orders for an MRI for her father.

## 2024-07-16 ENCOUNTER — HOSPITAL ENCOUNTER (OUTPATIENT)
Dept: RADIOLOGY | Facility: HOSPITAL | Age: 79
Discharge: HOME | End: 2024-07-16
Attending: INTERNAL MEDICINE
Payer: MEDICARE

## 2024-07-16 DIAGNOSIS — R94.2 ABNORMAL PET SCAN OF LUNG: ICD-10-CM

## 2024-07-16 DIAGNOSIS — R91.8 LUNG NODULES: ICD-10-CM

## 2024-07-16 PROCEDURE — 71250 CT THORAX DX C-: CPT | Mod: TC,PO

## 2024-07-16 PROCEDURE — 71250 CT THORAX DX C-: CPT | Mod: 26,,, | Performed by: RADIOLOGY

## 2024-07-17 PROBLEM — R91.8 LUNG NODULES: Status: ACTIVE | Noted: 2024-07-17

## 2024-07-17 PROBLEM — D72.820 LYMPHOCYTOSIS: Status: ACTIVE | Noted: 2024-07-17

## 2024-08-21 ENCOUNTER — OFFICE VISIT (OUTPATIENT)
Dept: PULMONOLOGY | Facility: CLINIC | Age: 79
End: 2024-08-21
Payer: MEDICARE

## 2024-08-21 VITALS — HEIGHT: 62 IN | BODY MASS INDEX: 20.8 KG/M2 | WEIGHT: 113 LBS

## 2024-08-21 DIAGNOSIS — J96.12 CHRONIC HYPERCAPNIC RESPIRATORY FAILURE: ICD-10-CM

## 2024-08-21 DIAGNOSIS — J43.2 CENTRILOBULAR EMPHYSEMA: Primary | ICD-10-CM

## 2024-08-21 DIAGNOSIS — R91.8 LUNG MASS: ICD-10-CM

## 2024-08-21 PROCEDURE — 99999 PR PBB SHADOW E&M-EST. PATIENT-LVL II: CPT | Mod: PBBFAC,,, | Performed by: INTERNAL MEDICINE

## 2024-08-21 NOTE — PROGRESS NOTES
"Subjective:      Patient ID: Francois Otero Sr. is a 79 y.o. male.    Chief Complaint: No chief complaint on file.    79 year old male with abnormal CT chest   Biapical scarring and air trapping are noted.  In the right upper lobe there is a now nearly confluent area of nodularity which previously measured 2.1 x 1.4 cm and now measures 2.2 x 1.6 cm.  The more peripheral satellite nodule has increased now measuring 13 by 13 mm versus 12 x 10 mm previously.  These areas or hypermetabolic on recent PET CT scan.  Areas of bronchiectasis are seen in lung bases.  There is a 2 mm nodule in the left lower lobe on series 4, image number 310 this is unchanged.    Patient presented with daughter. He is at [place] for second opinion. Seen in [place]. He is hesitant about getting a biopsy due to concern of spread of cancer.    Former smoker.     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         Review of Systems   Respiratory:  Positive for cough, shortness of breath and dyspnea on extertion.      Objective:     Physical Exam   Constitutional: He appears well-nourished.   Cardiovascular: Normal rate.   Pulmonary/Chest: Normal expansion and effort normal. He has no wheezes. He has no rhonchi.   Abdominal: Soft. Bowel sounds are normal.   Neurological: He is alert.   Nursing note and vitals reviewed.    Personal Diagnostic Review  Pulmonary function tests: FEV1:   (45 % predicted)      8/21/2024     9:48 AM 7/17/2024    10:05 AM 7/17/2024     9:58 AM 3/18/2024     3:06 PM 3/18/2024     2:57 PM 2/7/2024    10:32 AM 1/10/2024     1:38 PM   Pulmonary Function Tests   FVC  1.9 liters  2.16 liters      FVC%  65  74      FEV1  1 liters  1.14 liters      FEV1%  45  52      SpO2   94 %  92 %     Height 5' 2" (1.575 m)  5' 2" (1.575 m)  5' 2" (1.575 m) 5' 2" (1.575 m) 5' 2" (1.575 m)   Weight 51.3 kg (113 lb)  51.3 kg (113 lb)  53.1 kg (117 lb) 50.8 kg (111 lb 15.9 oz) 49.8 kg (109 lb 12.6 oz)   BMI (Calculated) 20.7  20.7  21.4 " 20.5 20.1        Assessment:     No diagnosis found.     Outpatient Encounter Medications as of 8/21/2024   Medication Sig Dispense Refill    ALPRAZolam (XANAX) 0.5 MG tablet Take 1 tablet (0.5 mg total) by mouth nightly as needed for Anxiety. 7 tablet 0    aspirin (ECOTRIN) 325 MG EC tablet Take 325 mg by mouth once daily.      butalbital-acetaminophen-caffeine -40 mg (FIORICET, ESGIC) -40 mg per tablet Take 1 tablet by mouth every 4 (four) hours as needed for Headaches. 20 tablet 0    finasteride (PROSCAR) 5 mg tablet Take 1 tablet (5 mg total) by mouth once daily. 90 tablet 3    fluticasone propionate (FLONASE) 50 mcg/actuation nasal spray 1 spray by Each Nostril route once daily.      ipratropium (ATROVENT) 0.02 % nebulizer solution Take 2.5 mLs (500 mcg total) by nebulization 3 (three) times daily. INHALE THE CONTENTS OF 1 VIAL VIA NEBULIZER THREE TIMES DAILY 270 mL 8    levalbuterol (XOPENEX) 1.25 mg/3 mL nebulizer solution Take 3 mLs (1.25 mg total) by nebulization 3 (three) times daily. 270 mL 11    levoFLOXacin (LEVAQUIN) 750 MG tablet 1 tablet Orally Once a day for 10 days (Patient not taking: Reported on 7/17/2024)      NIFEdipine (PROCARDIA-XL) 60 MG (OSM) 24 hr tablet Take 1 tablet (60 mg total) by mouth once daily. 30 tablet 11    predniSONE (DELTASONE) 10 mg tablet pack PRN (Patient not taking: Reported on 7/17/2024)      predniSONE (DELTASONE) 10 MG tablet Take 1 tablet (10 mg total) by mouth once daily. (Patient not taking: Reported on 7/17/2024) 90 tablet 3    SYMBICORT 160-4.5 mcg/actuation HFAA Inhale 2 puffs into the lungs every 12 (twelve) hours.      tamsulosin (FLOMAX) 0.4 mg Cap Take 1 capsule (0.4 mg total) by mouth every evening. 90 capsule 3    traZODone (DESYREL) 50 MG tablet 1 tablet at bedtime as needed Orally Once a day for 30 days       No facility-administered encounter medications on file as of 8/21/2024.     No orders of the defined types were placed in this  "encounter.      Plan:   1. Centrilobular emphysema  Assessment & Plan:  Moderately severe COPD. On Symbicort and daily prednisone.         2. Chronic hypercapnic respiratory failure  Assessment & Plan:  Patient with Hypoxic Respiratory failure which is Chronic.  he is on home oxygen at 2 LPM. Supplemental oxygen was provided and noted- [unfilled].   Signs/symptoms of respiratory failure include- tachypnea. Contributing diagnoses includes - COPD Labs and images were reviewed. Patient Has not had a recent ABG. Will treat underlying causes and adjust management of respiratory failure as follows      3. Left upper lobe mass  Assessment & Plan:  Needs a biopsy but patient is worried about "spread of cancer" biopsy. Tried to educate patient that a biopsy would be the only way he could receive treatment because step 1 is identification of the malignancy.           "

## 2024-08-23 ENCOUNTER — TELEPHONE (OUTPATIENT)
Dept: PULMONOLOGY | Facility: CLINIC | Age: 79
End: 2024-08-23
Payer: MEDICARE

## 2024-08-23 NOTE — TELEPHONE ENCOUNTER
I spoke with patient's daughter, Jennifer to schedule appointment with Dr Rangel on 9/9/24 at 8:30am for consult for ebus/robotic bronch from Dr Fraser. Jennifer confirmed and verbalized understanding.

## 2024-08-23 NOTE — TELEPHONE ENCOUNTER
I spoke with patient to let him know I was calling to schedule with Dr Rangel or Dr Tobin being referred by Dr Fraser. Patient stated he will have his daughter, Jennifer call me back to schedule. Mr Otero verbalized understanding.

## 2024-08-23 NOTE — TELEPHONE ENCOUNTER
----- Message from Rupinder Fraser MD sent at 8/21/2024  9:41 AM CDT -----  Please schedule this patient with Dr. Rangel or Dr. Tobin.     Thanks,  Rupinder

## 2024-08-28 PROBLEM — F41.9 ANXIETY: Status: RESOLVED | Noted: 2023-12-05 | Resolved: 2024-08-28

## 2024-08-29 NOTE — ASSESSMENT & PLAN NOTE
Patient with Hypoxic Respiratory failure which is Chronic.  he is on home oxygen at 2 LPM. Supplemental oxygen was provided and noted- [unfilled].   Signs/symptoms of respiratory failure include- tachypnea. Contributing diagnoses includes - COPD Labs and images were reviewed. Patient Has not had a recent ABG. Will treat underlying causes and adjust management of respiratory failure as follows

## 2024-08-29 NOTE — ASSESSMENT & PLAN NOTE
"Needs a biopsy but patient is worried about "spread of cancer" biopsy. Tried to educate patient that a biopsy would be the only way he could receive treatment because step 1 is identification of the malignancy.   "

## 2024-09-09 ENCOUNTER — HOSPITAL ENCOUNTER (OUTPATIENT)
Dept: RADIOLOGY | Facility: HOSPITAL | Age: 79
Discharge: HOME | End: 2024-09-09
Attending: INTERNAL MEDICINE
Payer: MEDICARE

## 2024-09-09 ENCOUNTER — OFFICE VISIT (OUTPATIENT)
Dept: PULMONOLOGY | Facility: CLINIC | Age: 79
End: 2024-09-09
Payer: MEDICARE

## 2024-09-09 VITALS
OXYGEN SATURATION: 93 % | BODY MASS INDEX: 21.17 KG/M2 | HEART RATE: 81 BPM | SYSTOLIC BLOOD PRESSURE: 142 MMHG | DIASTOLIC BLOOD PRESSURE: 78 MMHG | HEIGHT: 62 IN | WEIGHT: 115.06 LBS

## 2024-09-09 DIAGNOSIS — J96.11 CHRONIC RESPIRATORY FAILURE WITH HYPOXIA AND HYPERCAPNIA: ICD-10-CM

## 2024-09-09 DIAGNOSIS — R91.8 PULMONARY NODULES/LESIONS, MULTIPLE: ICD-10-CM

## 2024-09-09 DIAGNOSIS — R91.1 PULMONARY NODULE: Primary | ICD-10-CM

## 2024-09-09 DIAGNOSIS — J96.12 CHRONIC RESPIRATORY FAILURE WITH HYPOXIA AND HYPERCAPNIA: ICD-10-CM

## 2024-09-09 DIAGNOSIS — J43.2 CENTRILOBULAR EMPHYSEMA: ICD-10-CM

## 2024-09-09 DIAGNOSIS — R91.1 SOLITARY PULMONARY NODULE: ICD-10-CM

## 2024-09-09 DIAGNOSIS — J44.1 COPD EXACERBATION: ICD-10-CM

## 2024-09-09 PROBLEM — R94.2 ABNORMAL PFT: Status: RESOLVED | Noted: 2024-03-18 | Resolved: 2024-09-09

## 2024-09-09 PROCEDURE — 71250 CT THORAX DX C-: CPT | Mod: 26,,, | Performed by: STUDENT IN AN ORGANIZED HEALTH CARE EDUCATION/TRAINING PROGRAM

## 2024-09-09 PROCEDURE — 71250 CT THORAX DX C-: CPT | Mod: TC

## 2024-09-09 PROCEDURE — 1159F MED LIST DOCD IN RCRD: CPT | Mod: CPTII,S$GLB,, | Performed by: INTERNAL MEDICINE

## 2024-09-09 PROCEDURE — 99215 OFFICE O/P EST HI 40 MIN: CPT | Mod: S$GLB,,, | Performed by: INTERNAL MEDICINE

## 2024-09-09 PROCEDURE — G2211 COMPLEX E/M VISIT ADD ON: HCPCS | Mod: S$GLB,,, | Performed by: INTERNAL MEDICINE

## 2024-09-09 PROCEDURE — 3288F FALL RISK ASSESSMENT DOCD: CPT | Mod: CPTII,S$GLB,, | Performed by: INTERNAL MEDICINE

## 2024-09-09 PROCEDURE — 1101F PT FALLS ASSESS-DOCD LE1/YR: CPT | Mod: CPTII,S$GLB,, | Performed by: INTERNAL MEDICINE

## 2024-09-09 PROCEDURE — 99999 PR PBB SHADOW E&M-EST. PATIENT-LVL V: CPT | Mod: PBBFAC,,, | Performed by: INTERNAL MEDICINE

## 2024-09-09 PROCEDURE — 3077F SYST BP >= 140 MM HG: CPT | Mod: CPTII,S$GLB,, | Performed by: INTERNAL MEDICINE

## 2024-09-09 PROCEDURE — 1160F RVW MEDS BY RX/DR IN RCRD: CPT | Mod: CPTII,S$GLB,, | Performed by: INTERNAL MEDICINE

## 2024-09-09 PROCEDURE — 3078F DIAST BP <80 MM HG: CPT | Mod: CPTII,S$GLB,, | Performed by: INTERNAL MEDICINE

## 2024-09-09 NOTE — PROGRESS NOTES
"Subjective:       Patient ID: Francois Otero Sr. is a 79 y.o. male.    Chief Complaint: Shortness of Breath    79 year old who quit smoking 10 years ago with severe emphysema and chronic hypoxic respiratory failure.  Previously seen by Dr Juma Pratt for enlarging pulmonary nodules.  It was recommended that a diagnostic bronchoscopy be performed for the bilateral nodules, but he deferred while taking a holistic remedy.  He followed up with Dr Rupinder Fraser who referred Mr Otero to me.      Patient is not adherent to any of his medications for HTN, COPD.  He prefers to use an albuterol portable neb tx.  Uses symbicort.  Unclear if using daily but states that he sometimes uses up to three times a day.    No weight loss.     Some exercise intolerance.      Has been hospitalized for COPD exacerbation       Review of Systems      RAMOS and exercise tolerance continues to decline.   Objective:      Vitals:    09/09/24 0835   BP: (!) 142/78   BP Location: Left arm   Patient Position: Sitting   Pulse: 81   SpO2: (!) 93%  Comment: 3.0   Weight: 52.2 kg (115 lb 1.3 oz)   Height: 5' 2" (1.575 m)      Physical Exam   Constitutional: He is oriented to person, place, and time. He appears well-developed and well-nourished.   HENT:   Head: Normocephalic.   Cardiovascular: Normal rate and regular rhythm.   Pulmonary/Chest: Normal expansion, symmetric chest wall expansion and effort normal. He has no wheezes. He has no rales.   Musculoskeletal:         General: No edema. Normal range of motion.   Lymphadenopathy: No supraclavicular adenopathy is present.     He has no cervical adenopathy.   Neurological: He is oriented to person, place, and time.   Skin: Skin is warm and dry.   Psychiatric: He has a normal mood and affect.     Personal Diagnostic Review    CT in July 2024 with likely growing EPHRAIM nodule when compared to PET 3/2024    RIGHT nodules x 2 persistent from CT July 2024 to PET March 2024 9/9/2024     8:35 AM " "8/21/2024     9:48 AM 7/17/2024    10:05 AM 7/17/2024     9:58 AM 3/18/2024     3:06 PM 3/18/2024     2:57 PM 2/7/2024    10:32 AM   Pulmonary Function Tests   FVC   1.9 liters  2.16 liters     FVC%   65  74     FEV1   1 liters  1.14 liters     FEV1%   45  52     SpO2 93 %   94 %  92 %    Height 5' 2" (1.575 m) 5' 2" (1.575 m)  5' 2" (1.575 m)  5' 2" (1.575 m) 5' 2" (1.575 m)   Weight 52.2 kg (115 lb 1.3 oz) 51.3 kg (113 lb)  51.3 kg (113 lb)  53.1 kg (117 lb) 50.8 kg (111 lb 15.9 oz)   BMI (Calculated) 21 20.7  20.7  21.4 20.5         Assessment:       1. Pulmonary nodule    2. Pulmonary nodules/lesions, multiple    3. Solitary pulmonary nodule    4. COPD exacerbation    5. Chronic respiratory failure with hypoxia and hypercapnia    6. Centrilobular emphysema        Outpatient Encounter Medications as of 9/9/2024   Medication Sig Dispense Refill    aspirin (ECOTRIN) 325 MG EC tablet Take 325 mg by mouth once daily.      butalbital-acetaminophen-caffeine -40 mg (FIORICET, ESGIC) -40 mg per tablet Take 1 tablet by mouth every 4 (four) hours as needed for Headaches. 20 tablet 0    finasteride (PROSCAR) 5 mg tablet Take 1 tablet (5 mg total) by mouth once daily. 90 tablet 3    fluticasone propionate (FLONASE) 50 mcg/actuation nasal spray 1 spray by Each Nostril route once daily.      ipratropium (ATROVENT) 0.02 % nebulizer solution Take 2.5 mLs (500 mcg total) by nebulization 3 (three) times daily. INHALE THE CONTENTS OF 1 VIAL VIA NEBULIZER THREE TIMES DAILY 270 mL 8    levalbuterol (XOPENEX) 1.25 mg/3 mL nebulizer solution Take 3 mLs (1.25 mg total) by nebulization 3 (three) times daily. 270 mL 11    levoFLOXacin (LEVAQUIN) 750 MG tablet       NIFEdipine (PROCARDIA-XL) 60 MG (OSM) 24 hr tablet Take 1 tablet (60 mg total) by mouth once daily. 30 tablet 11    predniSONE (DELTASONE) 10 mg tablet pack PRN      predniSONE (DELTASONE) 10 MG tablet Take 1 tablet (10 mg total) by mouth once daily. 90 tablet 3    " SYMBICORT 160-4.5 mcg/actuation HFAA Inhale 2 puffs into the lungs every 12 (twelve) hours.      tamsulosin (FLOMAX) 0.4 mg Cap Take 1 capsule (0.4 mg total) by mouth every evening. 90 capsule 3    [DISCONTINUED] traZODone (DESYREL) 50 MG tablet 1 tablet at bedtime as needed Orally Once a day for 30 days      [DISCONTINUED] ALPRAZolam (XANAX) 0.5 MG tablet Take 1 tablet (0.5 mg total) by mouth nightly as needed for Anxiety. 7 tablet 0     No facility-administered encounter medications on file as of 9/9/2024.     Orders Placed This Encounter   Procedures    CT Chest Without Contrast     Ion Chest Navigational Bronchoscopy     Standing Status:   Future     Number of Occurrences:   1     Standing Expiration Date:   9/9/2025     Scheduling Instructions:      Ion Chest Navigational Bronchoscopy     Order Specific Question:   May the Radiologist modify the order per protocol to meet the clinical needs of the patient?     Answer:   Yes    Case Request Operating Room: ROBOTIC BRONCHOSCOPY     Standing Status:   Standing     Number of Occurrences:   1     Order Specific Question:   Medical Necessity:     Answer:   Medically Urgent [101]     Order Specific Question:   CPT Code:     Answer:   [facility] ROBOTIC SURGICAL SYSTEM []     Order Specific Question:   CPT Code:     Answer:   [facility] BRONCH W/ EBUS, DIAG OR THERA INTERVENTION PERIPHERAL LESION(S), INCL GUID, ADD ON CODE [45104]     Order Specific Question:   CPT Code:     Answer:   [facility] BRONCH W/ EBUS, SAMPLING 3 OR MORE NODES, INCL GUIDE [50259]     Order Specific Question:   CPT Code:     Answer:   [place] BRONCHOSCOPY,BIOPSY [84719]     Order Specific Question:   Case classification     Answer:   E - Elective [90]     Order Specific Question:   Is an on-site pathologist required for this procedure?     Answer:   N/A     Plan:     Problem List Items Addressed This Visit       Chronic respiratory failure with hypoxia and hypercapnia    Overview     Due to severe  emphysema.  Recommend continuing supplemental oxygen and adherence to COPD regimen.          COPD exacerbation    Overview     Advised on the use of controllers that prevent exacerbations.  Instructed to restart symbicort, 2 puffs twice daily  Albulterol every 4 hours as needed.  Continues to benefit from supplemental oxygen.          Pulmonary nodules/lesions, multiple    Overview     Robotic bronchoscopy with EBUS scheduled 9/27  I have explained the risks, benefits and alternatives of the procedure in detail.  The patient voices understanding and all questions have been answered.  The patient agrees to proceed as planned.     Will need repeat CT for navigation planning    Accompanied by his daughter who also understood the risk.          Relevant Orders    CT Chest Without Contrast    Centrilobular emphysema    Overview     Adherence to symbicort as prescribed  Albuterol as needed for rescue and prevention  Supplemental oxygen.           Other Visit Diagnoses       Pulmonary nodule    -  Primary    Relevant Orders    Case Request Operating Room: ROBOTIC BRONCHOSCOPY (Completed)    Solitary pulmonary nodule        Relevant Orders    CT Chest Without Contrast            Counseled and explained increased risk of respiratory complications associated emphysema and bronchoscopy including PTX and death    Visit today included increased complexity associated with the care of the episodic problem see above addressed and managing the longitudinal care of the patient due to the serious and/or complex managed problem(s) see above. .

## 2024-09-09 NOTE — H&P (VIEW-ONLY)
"Subjective:       Patient ID: Francois Otero Sr. is a 79 y.o. male.    Chief Complaint: Shortness of Breath    79 year old who quit smoking 10 years ago with severe emphysema and chronic hypoxic respiratory failure.  Previously seen by Dr Juma Pratt for enlarging pulmonary nodules.  It was recommended that a diagnostic bronchoscopy be performed for the bilateral nodules, but he deferred while taking a holistic remedy.  He followed up with Dr Rupinder Fraser who referred Mr Otero to me.      Patient is not adherent to any of his medications for HTN, COPD.  He prefers to use an albuterol portable neb tx.  Uses symbicort.  Unclear if using daily but states that he sometimes uses up to three times a day.    No weight loss.     Some exercise intolerance.      Has been hospitalized for COPD exacerbation       Review of Systems      RAMOS and exercise tolerance continues to decline.   Objective:      Vitals:    09/09/24 0835   BP: (!) 142/78   BP Location: Left arm   Patient Position: Sitting   Pulse: 81   SpO2: (!) 93%  Comment: 3.0   Weight: 52.2 kg (115 lb 1.3 oz)   Height: 5' 2" (1.575 m)      Physical Exam   Constitutional: He is oriented to person, place, and time. He appears well-developed and well-nourished.   HENT:   Head: Normocephalic.   Cardiovascular: Normal rate and regular rhythm.   Pulmonary/Chest: Normal expansion, symmetric chest wall expansion and effort normal. He has no wheezes. He has no rales.   Musculoskeletal:         General: No edema. Normal range of motion.   Lymphadenopathy: No supraclavicular adenopathy is present.     He has no cervical adenopathy.   Neurological: He is oriented to person, place, and time.   Skin: Skin is warm and dry.   Psychiatric: He has a normal mood and affect.     Personal Diagnostic Review    CT in July 2024 with likely growing EPHRAIM nodule when compared to PET 3/2024    RIGHT nodules x 2 persistent from CT July 2024 to PET March 2024 9/9/2024     8:35 AM " "8/21/2024     9:48 AM 7/17/2024    10:05 AM 7/17/2024     9:58 AM 3/18/2024     3:06 PM 3/18/2024     2:57 PM 2/7/2024    10:32 AM   Pulmonary Function Tests   FVC   1.9 liters  2.16 liters     FVC%   65  74     FEV1   1 liters  1.14 liters     FEV1%   45  52     SpO2 93 %   94 %  92 %    Height 5' 2" (1.575 m) 5' 2" (1.575 m)  5' 2" (1.575 m)  5' 2" (1.575 m) 5' 2" (1.575 m)   Weight 52.2 kg (115 lb 1.3 oz) 51.3 kg (113 lb)  51.3 kg (113 lb)  53.1 kg (117 lb) 50.8 kg (111 lb 15.9 oz)   BMI (Calculated) 21 20.7  20.7  21.4 20.5         Assessment:       1. Pulmonary nodule    2. Pulmonary nodules/lesions, multiple    3. Solitary pulmonary nodule    4. COPD exacerbation    5. Chronic respiratory failure with hypoxia and hypercapnia    6. Centrilobular emphysema        Outpatient Encounter Medications as of 9/9/2024   Medication Sig Dispense Refill    aspirin (ECOTRIN) 325 MG EC tablet Take 325 mg by mouth once daily.      butalbital-acetaminophen-caffeine -40 mg (FIORICET, ESGIC) -40 mg per tablet Take 1 tablet by mouth every 4 (four) hours as needed for Headaches. 20 tablet 0    finasteride (PROSCAR) 5 mg tablet Take 1 tablet (5 mg total) by mouth once daily. 90 tablet 3    fluticasone propionate (FLONASE) 50 mcg/actuation nasal spray 1 spray by Each Nostril route once daily.      ipratropium (ATROVENT) 0.02 % nebulizer solution Take 2.5 mLs (500 mcg total) by nebulization 3 (three) times daily. INHALE THE CONTENTS OF 1 VIAL VIA NEBULIZER THREE TIMES DAILY 270 mL 8    levalbuterol (XOPENEX) 1.25 mg/3 mL nebulizer solution Take 3 mLs (1.25 mg total) by nebulization 3 (three) times daily. 270 mL 11    levoFLOXacin (LEVAQUIN) 750 MG tablet       NIFEdipine (PROCARDIA-XL) 60 MG (OSM) 24 hr tablet Take 1 tablet (60 mg total) by mouth once daily. 30 tablet 11    predniSONE (DELTASONE) 10 mg tablet pack PRN      predniSONE (DELTASONE) 10 MG tablet Take 1 tablet (10 mg total) by mouth once daily. 90 tablet 3    " SYMBICORT 160-4.5 mcg/actuation HFAA Inhale 2 puffs into the lungs every 12 (twelve) hours.      tamsulosin (FLOMAX) 0.4 mg Cap Take 1 capsule (0.4 mg total) by mouth every evening. 90 capsule 3    [DISCONTINUED] traZODone (DESYREL) 50 MG tablet 1 tablet at bedtime as needed Orally Once a day for 30 days      [DISCONTINUED] ALPRAZolam (XANAX) 0.5 MG tablet Take 1 tablet (0.5 mg total) by mouth nightly as needed for Anxiety. 7 tablet 0     No facility-administered encounter medications on file as of 9/9/2024.     Orders Placed This Encounter   Procedures    CT Chest Without Contrast     Ion Chest Navigational Bronchoscopy     Standing Status:   Future     Number of Occurrences:   1     Standing Expiration Date:   9/9/2025     Scheduling Instructions:      Ion Chest Navigational Bronchoscopy     Order Specific Question:   May the Radiologist modify the order per protocol to meet the clinical needs of the patient?     Answer:   Yes    Case Request Operating Room: ROBOTIC BRONCHOSCOPY     Standing Status:   Standing     Number of Occurrences:   1     Order Specific Question:   Medical Necessity:     Answer:   Medically Urgent [101]     Order Specific Question:   CPT Code:     Answer:   [facility] ROBOTIC SURGICAL SYSTEM []     Order Specific Question:   CPT Code:     Answer:   [place] BRONCH W/ EBUS, DIAG OR THERA INTERVENTION PERIPHERAL LESION(S), INCL GUID, ADD ON CODE [13593]     Order Specific Question:   CPT Code:     Answer:   [place] BRONCH W/ EBUS, SAMPLING 3 OR MORE NODES, INCL GUIDE [20912]     Order Specific Question:   CPT Code:     Answer:   [facility] BRONCHOSCOPY,BIOPSY [23137]     Order Specific Question:   Case classification     Answer:   E - Elective [90]     Order Specific Question:   Is an on-site pathologist required for this procedure?     Answer:   N/A     Plan:     Problem List Items Addressed This Visit       Chronic respiratory failure with hypoxia and hypercapnia    Overview     Due to severe  emphysema.  Recommend continuing supplemental oxygen and adherence to COPD regimen.          COPD exacerbation    Overview     Advised on the use of controllers that prevent exacerbations.  Instructed to restart symbicort, 2 puffs twice daily  Albulterol every 4 hours as needed.  Continues to benefit from supplemental oxygen.          Pulmonary nodules/lesions, multiple    Overview     Robotic bronchoscopy with EBUS scheduled 9/27  I have explained the risks, benefits and alternatives of the procedure in detail.  The patient voices understanding and all questions have been answered.  The patient agrees to proceed as planned.     Will need repeat CT for navigation planning    Accompanied by his daughter who also understood the risk.          Relevant Orders    CT Chest Without Contrast    Centrilobular emphysema    Overview     Adherence to symbicort as prescribed  Albuterol as needed for rescue and prevention  Supplemental oxygen.           Other Visit Diagnoses       Pulmonary nodule    -  Primary    Relevant Orders    Case Request Operating Room: ROBOTIC BRONCHOSCOPY (Completed)    Solitary pulmonary nodule        Relevant Orders    CT Chest Without Contrast            Counseled and explained increased risk of respiratory complications associated emphysema and bronchoscopy including PTX and death    Visit today included increased complexity associated with the care of the episodic problem see above addressed and managing the longitudinal care of the patient due to the serious and/or complex managed problem(s) see above. .

## 2024-09-24 ENCOUNTER — TELEPHONE (OUTPATIENT)
Dept: PULMONOLOGY | Facility: CLINIC | Age: 79
End: 2024-09-24
Payer: MEDICARE

## 2024-09-24 NOTE — TELEPHONE ENCOUNTER
I spoke with patient to let him know arrival time to [facility] for 7am for robotic bronchoscopy with Dr Rangel on 9/27/24. NPO after midnight. I answered all questions. Patient confirmed and verbalized understanding.

## 2024-09-26 ENCOUNTER — ANESTHESIA EVENT (OUTPATIENT)
Dept: SURGERY | Facility: HOSPITAL | Age: 79
End: 2024-09-26
Payer: MEDICARE

## 2024-09-26 NOTE — ANESTHESIA PREPROCEDURE EVALUATION
[facility]  Anesthesia Pre-Operative Evaluation         Patient Name: Francois Otero Sr.  YOB: 1945  MRN: [id number]    SUBJECTIVE:     Pre-operative evaluation for Procedure(s) (LRB):  ROBOTIC BRONCHOSCOPY (N/A)     09/26/2024    Francois Otero Sr. is a 79 y.o. male with a significant medical history of severe COPD, CAD, HTN, hypothyroid who presents for the above procedure.    Echo 7/2023    Interpretation Summary  · Normal systolic function.  · The estimated ejection fraction is 60%.  · Normal left ventricular diastolic function.  · Normal right ventricular size with normal right ventricular systolic function.  · Normal central venous pressure (3 mmHg).      LDA: None documented.       Prev airway: none documented. Previous MAC    Drips: None documented.      Patient Active Problem List   Diagnosis    Coronary artery disease    Hyperlipidemia    Hypertension    PAD (peripheral artery disease)    Constipation    Abdominal pain    FHx: colon cancer    Hypothyroidism due to acquired atrophy of thyroid    Hyponatremia    Hyperglycemia    Chronic respiratory failure with hypoxia and hypercapnia    Pneumonia due to COVID-19 virus 12/03/2020    COVID-19 virus detected 12/03/2020    Palliative care encounter    Acute renal failure    Abnormal sense of taste    COPD exacerbation    History of benzodiazepine use    Urinary retention    Tobacco abuse, in remission    Pulmonary nodules/lesions, multiple    Lymphocytosis    Centrilobular emphysema       Review of patient's allergies indicates:   Allergen Reactions    Lisinopril Edema    Penicillin g sodium      Other reaction(s): unknown. was told by his mother.    Penicillins     Sulfa (sulfonamide antibiotics)        Current Inpatient Medications:      No current facility-administered medications on file prior to encounter.     Current  Outpatient Medications on File Prior to Encounter   Medication Sig Dispense Refill    aspirin (ECOTRIN) 325 MG EC tablet Take 325 mg by mouth once daily.      butalbital-acetaminophen-caffeine -40 mg (FIORICET, ESGIC) -40 mg per tablet Take 1 tablet by mouth every 4 (four) hours as needed for Headaches. 20 tablet 0    finasteride (PROSCAR) 5 mg tablet Take 1 tablet (5 mg total) by mouth once daily. 90 tablet 3    fluticasone propionate (FLONASE) 50 mcg/actuation nasal spray 1 spray by Each Nostril route once daily.      ipratropium (ATROVENT) 0.02 % nebulizer solution Take 2.5 mLs (500 mcg total) by nebulization 3 (three) times daily. INHALE THE CONTENTS OF 1 VIAL VIA NEBULIZER THREE TIMES DAILY 270 mL 8    levalbuterol (XOPENEX) 1.25 mg/3 mL nebulizer solution Take 3 mLs (1.25 mg total) by nebulization 3 (three) times daily. 270 mL 11    levoFLOXacin (LEVAQUIN) 750 MG tablet       NIFEdipine (PROCARDIA-XL) 60 MG (OSM) 24 hr tablet Take 1 tablet (60 mg total) by mouth once daily. 30 tablet 11    predniSONE (DELTASONE) 10 mg tablet pack PRN      predniSONE (DELTASONE) 10 MG tablet Take 1 tablet (10 mg total) by mouth once daily. 90 tablet 3    SYMBICORT 160-4.5 mcg/actuation HFAA Inhale 2 puffs into the lungs every 12 (twelve) hours.      tamsulosin (FLOMAX) 0.4 mg Cap Take 1 capsule (0.4 mg total) by mouth every evening. 90 capsule 3       Past Surgical History:   Procedure Laterality Date    by pass on leg       CARDIAC CATHETERIZATION      cardiac stents       COLONOSCOPY N/A 9/14/2017    Procedure: COLONOSCOPY;  Surgeon: Robb Yepez MD;  Location: [facility];  Service: Endoscopy;  Laterality: N/A; repeat in 5 years    UPPER GASTROINTESTINAL ENDOSCOPY  09/14/2017    Dr. Yepez       Social History     Socioeconomic History    Marital status:      Spouse name: Heather Angel    Number of children: 2   Tobacco Use    Smoking status: Former     Current packs/day: 0.00     Types: Cigarettes      "Quit date: 9/3/2015     Years since quittin.0    Smokeless tobacco: Never   Substance and Sexual Activity    Alcohol use: Yes     Alcohol/week: 0.0 standard drinks of alcohol     Comment: rarely     Drug use: No    Sexual activity: Not Currently     Partners: Female     Social Determinants of Health     Food Insecurity: No Food Insecurity (2023)    Hunger Vital Sign     Worried About Running Out of Food in the Last Year: Never true     Ran Out of Food in the Last Year: Never true   Transportation Needs: No Transportation Needs (2023)    PRAPARE - Transportation     Lack of Transportation (Medical): No     Lack of Transportation (Non-Medical): No   Physical Activity: Patient Declined (2023)    Exercise Vital Sign     Days of Exercise per Week: Patient declined     Minutes of Exercise per Session: Patient declined   Housing Stability: Low Risk  (2023)    Housing Stability Vital Sign     Unable to Pay for Housing in the Last Year: No     Number of Places Lived in the Last Year: 1     Unstable Housing in the Last Year: No       OBJECTIVE:     Vital Signs Range:      2024     9:58 AM 2024     9:48 AM 2024     8:35 AM   Vitals - 1 value per visit   SYSTOLIC 118  142   DIASTOLIC 62  78   Pulse 93  81   SPO2 94 %  93 %   Weight (lb) 113 113 115.08   Weight (kg) 51.256 51.256 52.2   Height 5' 2" (1.575 m) 5' 2" (1.575 m) 5' 2" (1.575 m)   BMI (Calculated) 20.7 20.7 21   Pain Score Zero           CBC:   Lab Results   Component Value Date    WBC 27.90 (H) 2023    HGB 11.5 (L) 2023    HCT 33.8 (L) 2023    MCV 87 2023     2023         CMP:   Sodium   Date Value Ref Range Status   2023 139 136 - 145 mmol/L Final     Potassium   Date Value Ref Range Status   2023 3.4 (L) 3.5 - 5.1 mmol/L Final     Chloride   Date Value Ref Range Status   2023 109 95 - 110 mmol/L Final     CO2   Date Value Ref Range Status   2023 24 23 - 29 mmol/L " Final     Glucose   Date Value Ref Range Status   12/19/2023 92 70 - 110 mg/dL Final     BUN   Date Value Ref Range Status   12/19/2023 33 (H) 8 - 23 mg/dL Final     Creatinine   Date Value Ref Range Status   02/07/2024 0.86 0.50 - 1.40 mg/dL Final     Calcium   Date Value Ref Range Status   12/19/2023 8.5 (L) 8.7 - 10.5 mg/dL Final     Total Protein   Date Value Ref Range Status   12/19/2023 5.0 (L) 6.0 - 8.4 g/dL Final     Albumin   Date Value Ref Range Status   12/19/2023 2.7 (L) 3.5 - 5.2 g/dL Final     Total Bilirubin   Date Value Ref Range Status   12/19/2023 0.4 0.1 - 1.0 mg/dL Final     Comment:     For infants and newborns, interpretation of results should be based  on gestational age, weight and in agreement with clinical  observations.    Premature Infant recommended reference ranges:  Up to 24 hours.............<8.0 mg/dL  Up to 48 hours............<12.0 mg/dL  3-5 days..................<15.0 mg/dL  6-29 days.................<15.0 mg/dL       Alkaline Phosphatase   Date Value Ref Range Status   12/19/2023 58 55 - 135 U/L Final     AST   Date Value Ref Range Status   12/19/2023 34 10 - 40 U/L Final     ALT   Date Value Ref Range Status   12/19/2023 79 (H) 10 - 44 U/L Final     Anion Gap   Date Value Ref Range Status   12/19/2023 6 (L) 8 - 16 mmol/L Final     eGFR if    Date Value Ref Range Status   03/09/2022 >60 >60 mL/min/1.73 m^2 Final     eGFR if non    Date Value Ref Range Status   03/09/2022 >60 >60 mL/min/1.73 m^2 Final     Comment:     Calculation used to obtain the estimated glomerular filtration  rate (eGFR) is the CKD-EPI equation.          INR:  Lab Results   Component Value Date    INR 1.0 12/05/2023       EKG:   Results for orders placed or performed during the hospital encounter of 12/05/23   EKG 12-lead    Collection Time: 12/05/23  4:31 PM    Narrative    Test Reason :     Vent. Rate : 125 BPM     Atrial Rate : 125 BPM     P-R Int : 148 ms          QRS Dur  : 120 ms      QT Int : 324 ms       P-R-T Axes : 080 108 076 degrees     QTc Int : 467 ms    Sinus tachycardia  Biatrial enlargement  Pulmonary disease pattern  Right bundle branch block  Abnormal ECG  When compared with ECG of 05-DEC-2023 15:38,  Previous ECG has undetermined rhythm, needs review  ST now depressed in Anterior leads  Confirmed by CASSY ROCHE MD ([id number]) on 12/6/2023 10:58:10 PM    Referred By: [email or web address]   SELF           Confirmed By:CASSY ROCHE MD        2D ECHO:   Results for orders placed in visit on 07/10/23    Echo    Interpretation Summary  · Normal systolic function.  · The estimated ejection fraction is 60%.  · Normal left ventricular diastolic function.  · Normal right ventricular size with normal right ventricular systolic function.  · Normal central venous pressure (3 mmHg).         ASSESSMENT/PLAN:           Pre-op Assessment    I have reviewed the Patient Summary Reports.     I have reviewed the Nursing Notes. I have reviewed the NPO Status.   I have reviewed the Medications.     Review of Systems  Anesthesia Hx:   History of prior surgery of interest to airway management or planning:  Previous anesthesia: MAC        Denies Family Hx of Anesthesia complications.    Denies Personal Hx of Anesthesia complications.                    Social:  Former Smoker       Cardiovascular:     Hypertension   CAD       Denies Angina.  Denies CHF.                                 Pulmonary:   COPD, moderate Asthma  Shortness of breath                  Renal/:  Chronic Renal Disease                Hepatic/GI:      Denies GERD. Denies Liver Disease.            Neurological:    Denies CVA.    Denies Seizures.                                Endocrine:  Denies Diabetes.               Physical Exam  General: Well nourished, Cooperative and Alert    Airway:  Mallampati: III / II  Mouth Opening: Normal  TM Distance: Normal  Tongue: Normal  Neck ROM: Normal ROM    Chest/Lungs:  Normal Respiratory  Rate    Heart:  Rate: Normal        Anesthesia Plan  Type of Anesthesia, risks & benefits discussed:    Anesthesia Type: Gen ETT  Intra-op Monitoring Plan: Standard ASA Monitors  Post Op Pain Control Plan: multimodal analgesia  Induction:  IV  Airway Plan: Direct, Post-Induction  Informed Consent: Informed consent signed with the Patient and all parties understand the risks and agree with anesthesia plan.  All questions answered.   ASA Score: 3  Day of Surgery Review of History & Physical: H&P Update referred to the surgeon/provider.    Ready For Surgery From Anesthesia Perspective.     .

## 2024-09-27 ENCOUNTER — HOSPITAL ENCOUNTER (OUTPATIENT)
Facility: HOSPITAL | Age: 79
Discharge: HOME | End: 2024-09-27
Attending: INTERNAL MEDICINE | Admitting: INTERNAL MEDICINE
Payer: MEDICARE

## 2024-09-27 ENCOUNTER — ANESTHESIA (OUTPATIENT)
Dept: SURGERY | Facility: HOSPITAL | Age: 79
End: 2024-09-27
Payer: MEDICARE

## 2024-09-27 VITALS
SYSTOLIC BLOOD PRESSURE: 138 MMHG | HEIGHT: 62 IN | BODY MASS INDEX: 19.32 KG/M2 | DIASTOLIC BLOOD PRESSURE: 64 MMHG | HEART RATE: 73 BPM | OXYGEN SATURATION: 97 % | TEMPERATURE: 98 F | RESPIRATION RATE: 25 BRPM | WEIGHT: 105 LBS

## 2024-09-27 DIAGNOSIS — R91.1 PULMONARY NODULE: ICD-10-CM

## 2024-09-27 DIAGNOSIS — J43.2 CENTRILOBULAR EMPHYSEMA: Primary | ICD-10-CM

## 2024-09-27 DIAGNOSIS — R91.8 PULMONARY NODULES/LESIONS, MULTIPLE: ICD-10-CM

## 2024-09-27 PROCEDURE — 27201423 OPTIME MED/SURG SUP & DEVICES STERILE SUPPLY: Performed by: INTERNAL MEDICINE

## 2024-09-27 PROCEDURE — 37000008 HC ANESTHESIA 1ST 15 MINUTES: Performed by: INTERNAL MEDICINE

## 2024-09-27 PROCEDURE — 31629 BRONCHOSCOPY/NEEDLE BX EACH: CPT | Mod: 51,,, | Performed by: INTERNAL MEDICINE

## 2024-09-27 PROCEDURE — 31627 NAVIGATIONAL BRONCHOSCOPY: CPT | Mod: ,,, | Performed by: INTERNAL MEDICINE

## 2024-09-27 PROCEDURE — 25000242 PHARM REV CODE 250 ALT 637 W/ HCPCS: Performed by: INTERNAL MEDICINE

## 2024-09-27 PROCEDURE — 63600175 PHARM REV CODE 636 W HCPCS

## 2024-09-27 PROCEDURE — 36000709 HC OR TIME LEV III EA ADD 15 MIN: Performed by: INTERNAL MEDICINE

## 2024-09-27 PROCEDURE — 31633 BRONCHOSCOPY/NEEDLE BX ADDL: CPT | Mod: ,,, | Performed by: INTERNAL MEDICINE

## 2024-09-27 PROCEDURE — 31628 BRONCHOSCOPY/LUNG BX EACH: CPT | Mod: 51,,, | Performed by: INTERNAL MEDICINE

## 2024-09-27 PROCEDURE — 37000009 HC ANESTHESIA EA ADD 15 MINS: Performed by: INTERNAL MEDICINE

## 2024-09-27 PROCEDURE — 31654 BRONCH EBUS IVNTJ PERPH LES: CPT | Mod: ,,, | Performed by: INTERNAL MEDICINE

## 2024-09-27 PROCEDURE — 27000221 HC OXYGEN, UP TO 24 HOURS

## 2024-09-27 PROCEDURE — 36000708 HC OR TIME LEV III 1ST 15 MIN: Performed by: INTERNAL MEDICINE

## 2024-09-27 PROCEDURE — C1726 CATH, BAL DIL, NON-VASCULAR: HCPCS | Performed by: INTERNAL MEDICINE

## 2024-09-27 PROCEDURE — 99900035 HC TECH TIME PER 15 MIN (STAT)

## 2024-09-27 PROCEDURE — 71000045 HC DOSC ROUTINE RECOVERY EA ADD'L HR: Performed by: INTERNAL MEDICINE

## 2024-09-27 PROCEDURE — 94640 AIRWAY INHALATION TREATMENT: CPT

## 2024-09-27 PROCEDURE — 94761 N-INVAS EAR/PLS OXIMETRY MLT: CPT

## 2024-09-27 PROCEDURE — 31653 BRONCH EBUS SAMPLNG 3/> NODE: CPT | Mod: ,,, | Performed by: INTERNAL MEDICINE

## 2024-09-27 PROCEDURE — 31632 BRONCHOSCOPY/LUNG BX ADDL: CPT | Mod: ,,, | Performed by: INTERNAL MEDICINE

## 2024-09-27 PROCEDURE — 71000044 HC DOSC ROUTINE RECOVERY FIRST HOUR: Performed by: INTERNAL MEDICINE

## 2024-09-27 PROCEDURE — 25000003 PHARM REV CODE 250

## 2024-09-27 PROCEDURE — 71000015 HC POSTOP RECOV 1ST HR: Performed by: INTERNAL MEDICINE

## 2024-09-27 RX ORDER — PROPOFOL 10 MG/ML
VIAL (ML) INTRAVENOUS CONTINUOUS PRN
Status: DISCONTINUED | OUTPATIENT
Start: 2024-09-27 | End: 2024-09-27

## 2024-09-27 RX ORDER — IPRATROPIUM BROMIDE AND ALBUTEROL SULFATE 2.5; .5 MG/3ML; MG/3ML
3 SOLUTION RESPIRATORY (INHALATION) EVERY 4 HOURS
Status: DISCONTINUED | OUTPATIENT
Start: 2024-09-27 | End: 2024-09-27 | Stop reason: HOSPADM

## 2024-09-27 RX ORDER — DEXAMETHASONE SODIUM PHOSPHATE 4 MG/ML
INJECTION, SOLUTION INTRA-ARTICULAR; INTRALESIONAL; INTRAMUSCULAR; INTRAVENOUS; SOFT TISSUE
Status: DISCONTINUED | OUTPATIENT
Start: 2024-09-27 | End: 2024-09-27

## 2024-09-27 RX ORDER — FENTANYL CITRATE 50 UG/ML
INJECTION, SOLUTION INTRAMUSCULAR; INTRAVENOUS
Status: DISCONTINUED | OUTPATIENT
Start: 2024-09-27 | End: 2024-09-27

## 2024-09-27 RX ORDER — FENTANYL CITRATE 50 UG/ML
25 INJECTION, SOLUTION INTRAMUSCULAR; INTRAVENOUS EVERY 5 MIN PRN
Status: DISCONTINUED | OUTPATIENT
Start: 2024-09-27 | End: 2024-09-27 | Stop reason: HOSPADM

## 2024-09-27 RX ORDER — PHENYLEPHRINE HYDROCHLORIDE 10 MG/ML
INJECTION INTRAVENOUS
Status: DISCONTINUED | OUTPATIENT
Start: 2024-09-27 | End: 2024-09-27

## 2024-09-27 RX ORDER — ROCURONIUM BROMIDE 10 MG/ML
INJECTION, SOLUTION INTRAVENOUS
Status: DISCONTINUED | OUTPATIENT
Start: 2024-09-27 | End: 2024-09-27

## 2024-09-27 RX ORDER — GLUCAGON 1 MG
1 KIT INJECTION
Status: DISCONTINUED | OUTPATIENT
Start: 2024-09-27 | End: 2024-09-27 | Stop reason: HOSPADM

## 2024-09-27 RX ORDER — HYDROMORPHONE HYDROCHLORIDE 1 MG/ML
0.2 INJECTION, SOLUTION INTRAMUSCULAR; INTRAVENOUS; SUBCUTANEOUS EVERY 5 MIN PRN
Status: DISCONTINUED | OUTPATIENT
Start: 2024-09-27 | End: 2024-09-27 | Stop reason: HOSPADM

## 2024-09-27 RX ORDER — PROCHLORPERAZINE EDISYLATE 5 MG/ML
5 INJECTION INTRAMUSCULAR; INTRAVENOUS EVERY 30 MIN PRN
Status: DISCONTINUED | OUTPATIENT
Start: 2024-09-27 | End: 2024-09-27 | Stop reason: HOSPADM

## 2024-09-27 RX ORDER — SODIUM CHLORIDE 0.9 % (FLUSH) 0.9 %
10 SYRINGE (ML) INJECTION
Status: DISCONTINUED | OUTPATIENT
Start: 2024-09-27 | End: 2024-09-27 | Stop reason: HOSPADM

## 2024-09-27 RX ORDER — LIDOCAINE HYDROCHLORIDE 20 MG/ML
INJECTION, SOLUTION EPIDURAL; INFILTRATION; INTRACAUDAL; PERINEURAL
Status: DISCONTINUED | OUTPATIENT
Start: 2024-09-27 | End: 2024-09-27

## 2024-09-27 RX ADMIN — PROPOFOL 125 MCG/KG/MIN: 10 INJECTION, EMULSION INTRAVENOUS at 08:09

## 2024-09-27 RX ADMIN — SODIUM CHLORIDE: 0.9 INJECTION, SOLUTION INTRAVENOUS at 08:09

## 2024-09-27 RX ADMIN — PHENYLEPHRINE HYDROCHLORIDE 100 MCG: 10 INJECTION INTRAVENOUS at 08:09

## 2024-09-27 RX ADMIN — ROCURONIUM BROMIDE 50 MG: 10 INJECTION, SOLUTION INTRAVENOUS at 08:09

## 2024-09-27 RX ADMIN — IPRATROPIUM BROMIDE AND ALBUTEROL SULFATE 3 ML: 2.5; .5 SOLUTION RESPIRATORY (INHALATION) at 11:09

## 2024-09-27 RX ADMIN — FENTANYL CITRATE 100 MCG: 50 INJECTION, SOLUTION INTRAMUSCULAR; INTRAVENOUS at 08:09

## 2024-09-27 RX ADMIN — ROCURONIUM BROMIDE 10 MG: 10 INJECTION, SOLUTION INTRAVENOUS at 09:09

## 2024-09-27 RX ADMIN — DEXAMETHASONE SODIUM PHOSPHATE 8 MG: 4 INJECTION, SOLUTION INTRAMUSCULAR; INTRAVENOUS at 08:09

## 2024-09-27 RX ADMIN — LIDOCAINE HYDROCHLORIDE 80 MG: 20 INJECTION, SOLUTION EPIDURAL; INFILTRATION; INTRACAUDAL; PERINEURAL at 08:09

## 2024-09-27 RX ADMIN — FENTANYL CITRATE 25 MCG: 50 INJECTION INTRAMUSCULAR; INTRAVENOUS at 10:09

## 2024-09-27 NOTE — ANESTHESIA PROCEDURE NOTES
Intubation    Date/Time: 9/27/2024 8:20 AM    Performed by: Nahid Conley MD  Authorized by: Scot May Jr., MD    Intubation:     Induction:  Intravenous    Intubated:  Postinduction    Mask Ventilation:  Easy with oral airway    Attempts:  1    Attempted By:  Resident anesthesiologist    Method of Intubation:  Direct    Blade:  Fabby 3    Laryngeal View Grade: Grade I - full view of cords      Difficult Airway Encountered?: No      Complications:  None    Airway Device:  Oral endotracheal tube    Airway Device Size:  9.0    Style/Cuff Inflation:  Cuffed (inflated to minimal occlusive pressure)    Tube secured:  22    Secured at:  The lips    Placement Verified By:  Capnometry and Revisualization with laryngoscopy    Complicating Factors:  None    Findings Post-Intubation:  BS equal bilateral and atraumatic/condition of teeth unchanged

## 2024-09-27 NOTE — INTERVAL H&P NOTE
The patient has been examined and the H&P has been reviewed:    I concur with the findings and no changes have occurred since H&P was written.    Anesthesia/Surgery risks, benefits and alternative options discussed and understood by patient/family.          Reviewed risk vs benefits of procedure again with Mr Otero and his daughter today.    Agree to proceed.    I have explained the risks, benefits and alternatives of the procedure in detail.  The patient voices understanding and all questions have been answered.  The patient agrees to proceed as planned.

## 2024-09-27 NOTE — ANESTHESIA POSTPROCEDURE EVALUATION
Anesthesia Post Evaluation    Patient: Francois Otero     Procedure(s) Performed: Procedure(s) (LRB):  ROBOTIC BRONCHOSCOPY (N/A)  ENDOBRONCHIAL ULTRASOUND (EBUS) (N/A)    Final Anesthesia Type: general      Patient location during evaluation: PACU  Patient participation: Yes- Able to Participate  Level of consciousness: awake and alert  Post-procedure vital signs: reviewed and stable  Pain management: adequate  Airway patency: patent    PONV status at discharge: No PONV  Anesthetic complications: no      Cardiovascular status: blood pressure returned to baseline  Respiratory status: spontaneous ventilation and room air  Hydration status: euvolemic  Follow-up not needed.              Vitals Value Taken Time   /64 09/27/24 1147   Temp 36.6 °C (97.8 °F) 09/27/24 1145   Pulse 76 09/27/24 1150   Resp 29 09/27/24 1149   SpO2 99 % 09/27/24 1150   Vitals shown include unfiled device data.      No case tracking events are documented in the log.      Pain/Adrienne Score: Pain Rating Prior to Med Admin: 6 (9/27/2024 10:38 AM)  Adrienne Score: 9 (9/27/2024 11:15 AM)

## 2024-09-27 NOTE — DISCHARGE SUMMARY
[facility] - [facility] (2nd Fl)  Discharge Note  Short Stay    Procedure(s) (LRB):  ROBOTIC BRONCHOSCOPY (N/A)  ENDOBRONCHIAL ULTRASOUND (EBUS) (N/A)      OUTCOME: Patient tolerated treatment/procedure well without complication and is now ready for discharge.    DISPOSITION: Home or Self Care    FINAL DIAGNOSIS:  <principal problem not specified>    FOLLOWUP:  call with results    DISCHARGE INSTRUCTIONS:    Discharge Procedure Orders   Diet general        TIME SPENT ON DISCHARGE: 5 minutes

## 2024-09-27 NOTE — TRANSFER OF CARE
"Anesthesia Transfer of Care Note    Patient: Francois Otero Sr.    Procedure(s) Performed: Procedure(s) (LRB):  ROBOTIC BRONCHOSCOPY (N/A)  ENDOBRONCHIAL ULTRASOUND (EBUS) (N/A)    Patient location: PACU    Anesthesia Type: general    Transport from OR: Transported from OR on 6-10 L/min O2 by face mask with adequate spontaneous ventilation    Post pain: adequate analgesia    Post assessment: no apparent anesthetic complications and tolerated procedure well    Post vital signs: stable    Level of consciousness: awake    Nausea/Vomiting: no nausea/vomiting    Complications: none    Transfer of care protocol was followed      Last vitals: Visit Vitals  BP (!) 181/81 (BP Location: Left arm, Patient Position: Lying)   Pulse 81   Temp 36.2 °C (97.2 °F) (Skin)   Resp 20   Ht 5' 2" (1.575 m)   Wt 47.6 kg (105 lb)   SpO2 100%   BMI 19.20 kg/m²     "

## 2024-10-03 ENCOUNTER — TELEPHONE (OUTPATIENT)
Dept: PULMONOLOGY | Facility: CLINIC | Age: 79
End: 2024-10-03
Payer: MEDICARE

## 2024-10-03 DIAGNOSIS — C34.92 ADENOCARCINOMA OF LUNG, LEFT: ICD-10-CM

## 2024-10-03 DIAGNOSIS — C34.91 ADENOCARCINOMA, LUNG, RIGHT: Primary | ICD-10-CM

## 2024-10-03 DIAGNOSIS — C34.90 MALIGNANT NEOPLASM OF UNSPECIFIED PART OF UNSPECIFIED BRONCHUS OR LUNG: ICD-10-CM

## 2024-10-03 LAB
ADEQUACY: ABNORMAL
FINAL PATHOLOGIC DIAGNOSIS: ABNORMAL
Lab: ABNORMAL

## 2024-10-03 NOTE — TELEPHONE ENCOUNTER
Called and sent text to Dr Rodriguez who assists with her father's care.  Bilateral lung nodules are now biopsy proven non small cell.  No lymph node involvement.  But both cell type of adenocarcinoma.  Will present to [facility] to determine 2 separate primaries and can be treated with SBRT (although EPHRAIM is quite large and could represent LN involvement) vs systemic therapy.     Will need follow up PET and I would recommend SBRT.    Will also recommend a palliative care consult once I am able to speak with them.     Spoke to daughter, Jennifer, and above recommendations discussed.  She would like me to assist with notifying her dad.  Will schedule a virtual visit for 8 am tomorrow morning.

## 2024-10-04 ENCOUNTER — OFFICE VISIT (OUTPATIENT)
Dept: PULMONOLOGY | Facility: CLINIC | Age: 79
End: 2024-10-04
Payer: MEDICARE

## 2024-10-04 DIAGNOSIS — C34.92 ADENOCARCINOMA, LUNG, LEFT: Primary | ICD-10-CM

## 2024-10-04 PROBLEM — J12.82 PNEUMONIA DUE TO COVID-19 VIRUS: Status: RESOLVED | Noted: 2020-12-03 | Resolved: 2024-10-04

## 2024-10-04 PROBLEM — U07.1 PNEUMONIA DUE TO COVID-19 VIRUS: Status: RESOLVED | Noted: 2020-12-03 | Resolved: 2024-10-04

## 2024-10-04 PROBLEM — U07.1 COVID-19 VIRUS DETECTED: Status: RESOLVED | Noted: 2020-12-03 | Resolved: 2024-10-04

## 2024-10-04 NOTE — PROGRESS NOTES
Subjective:       Patient ID: Francois Otero Sr. is a 79 y.o. male.    Chief Complaint: Biopsy results.     80 yo former smoker with a history of enlarging, bilateral pulmonary nodules since 2023.  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  He was referred to me to consider biopsy.  Biopsy performed and both right upper and left upper (paramediastinal) mass was positive for adenocarcinoma.      This visit was to review path and begin treatment discussions.    Immunotherapy is still pending.    Patient still drives and is lives independently.  Two children who assist him and accompany him to all visits.      Wife  of cancer after a 10 year walsh so has some aversion to systemic therapy.  Open to radiation.          Review of Systems    Objective:      Physical Exam  Personal Diagnostic Review  Pathology:  Lymph nodes 4L, 7 and 11R are negative form malignancy.   1. LUNG, RIGHT UPPER LOBE, [id number] WITH CELL BLOCK:   Positive for malignancy.   Non-small cell carcinoma, favoring adenocarcinoma     2. LUNG, RIGHT UPPER LOBE, TRNASBRONCHIAL BIIOPSY:   Positive for malignancy   Non-small cell carcinoma, favoring adenocarcinoma     3. LUNG, LEFT UPPER LOBE, [id number] WITH CELL BLOCK:   Positive for malignancy   Non-small cell carcinoma, favoring adenocarcinoma         2024    11:45 AM 2024    11:30 AM 2024    11:27 AM 2024    11:15 AM 2024    11:00 AM 2024    10:45 AM 2024    10:30 AM   Pulmonary Function Tests   SpO2 97 % 99 % 100 % 100 % 100 % 100 % 99 %         Assessment:       1. Adenocarcinoma, lung, left        Outpatient Encounter Medications as of 10/4/2024   Medication Sig Dispense Refill    aspirin (ECOTRIN) 325 MG EC tablet Take 325 mg by mouth once daily.      butalbital-acetaminophen-caffeine -40 mg (FIORICET, ESGIC) -40 mg per tablet Take 1 tablet by mouth every 4 (four) hours as  needed for Headaches. 20 tablet 0    finasteride (PROSCAR) 5 mg tablet Take 1 tablet (5 mg total) by mouth once daily. 90 tablet 3    fluticasone propionate (FLONASE) 50 mcg/actuation nasal spray 1 spray by Each Nostril route once daily.      ipratropium (ATROVENT) 0.02 % nebulizer solution Take 2.5 mLs (500 mcg total) by nebulization 3 (three) times daily. INHALE THE CONTENTS OF 1 VIAL VIA NEBULIZER THREE TIMES DAILY 270 mL 8    levalbuterol (XOPENEX) 1.25 mg/3 mL nebulizer solution Take 3 mLs (1.25 mg total) by nebulization 3 (three) times daily. 270 mL 11    levoFLOXacin (LEVAQUIN) 750 MG tablet       NIFEdipine (PROCARDIA-XL) 60 MG (OSM) 24 hr tablet Take 1 tablet (60 mg total) by mouth once daily. 30 tablet 11    predniSONE (DELTASONE) 10 mg tablet pack PRN      predniSONE (DELTASONE) 10 MG tablet Take 1 tablet (10 mg total) by mouth once daily. 90 tablet 3    SYMBICORT 160-4.5 mcg/actuation HFAA Inhale 2 puffs into the lungs every 12 (twelve) hours.      tamsulosin (FLOMAX) 0.4 mg Cap Take 1 capsule (0.4 mg total) by mouth every evening. 90 capsule 3     No facility-administered encounter medications on file as of 10/4/2024.     No orders of the defined types were placed in this encounter.    Plan:     Problem List Items Addressed This Visit       Adenocarcinoma, lung, left - Primary    Overview     Clinically appears to have mediastinal LN involvment abutting tumor.  Obtain PET.  Will present at [facility].    Med/onc and heme onc consult    Also with adenocarcinoma of the right upper lobe, separate primary or metastasis?        Long discussion with family.  Difficult to prognosticate without a multi-disciplinary approach, NGS and PDL-1 tests available.   Will restage with PET, refer to Med/onc, heme/onc consult.    Will present at [facility] as well.    21 minutes of total time spent on the encounter, which includes face to face time and non-face to face time preparing to see the patient (eg, review of tests), Obtaining  and/or reviewing separately obtained history, Documenting clinical information in the electronic or other health record, Independently interpreting results (not separately reported) and communicating results to the patient/family/caregiver, or Care coordination (not separately reported).

## 2024-10-07 ENCOUNTER — TELEPHONE (OUTPATIENT)
Dept: HEMATOLOGY/ONCOLOGY | Facility: CLINIC | Age: 79
End: 2024-10-07
Payer: MEDICARE

## 2024-10-07 DIAGNOSIS — R91.1 PULMONARY NODULE: Primary | ICD-10-CM

## 2024-10-07 DIAGNOSIS — C80.1 ADENOCARCINOMA: ICD-10-CM

## 2024-10-07 DIAGNOSIS — C34.92 ADENOCARCINOMA, LUNG, LEFT: ICD-10-CM

## 2024-10-09 ENCOUNTER — TELEPHONE (OUTPATIENT)
Dept: HEMATOLOGY/ONCOLOGY | Facility: CLINIC | Age: 79
End: 2024-10-09
Payer: MEDICARE

## 2024-10-09 DIAGNOSIS — C34.92 ADENOCARCINOMA, LUNG, LEFT: ICD-10-CM

## 2024-10-09 DIAGNOSIS — C80.1 ADENOCARCINOMA: Primary | ICD-10-CM

## 2024-10-09 NOTE — NURSING
Patient's daughter notified of the scheduled MRI for 10/13/24.  Discussed location.  Provided my call back information if changes need to be made.  Oncology Navigation   Intake  Cancer Type: Thoracic  Type of Referral: Internal  Initial Nurse Navigator Contact: 10/08/24  First Appointment Available: 10/16/24  Appointment Date: 10/16/24  First Available Date vs. Scheduled Date (days): 0  Multiple appointments: Yes  Reason if booked > 7 days after scheduling: Transportation coordination     Treatment  Current Status: Staging work-up    Surgery: N/A    Medical Oncologist: Kelsey  Consult Date: 10/16/24    Radiation Oncologist: Bere  Start Date: 10/16/24    Procedures: PET scan; MRI  MRI Schedule Date: 10/13/24  PET Scan Schedule Date: 10/11/24          Radiation Oncologist: Bere    Support Systems: Children  Barriers of Care: Transportation     Acuity   Needed: 0  Support: 0  Verbalizes Financial Concerns: 0  Transportation: 1  History of noncompliance/frequent no shows and cancellations: 0  Verbalizes the need for more education: 1  Navigation Acuity: 2     Follow Up  No follow-ups on file.

## 2024-10-11 ENCOUNTER — HOSPITAL ENCOUNTER (OUTPATIENT)
Dept: RADIOLOGY | Facility: HOSPITAL | Age: 79
Discharge: HOME | End: 2024-10-11
Attending: INTERNAL MEDICINE
Payer: MEDICARE

## 2024-10-11 DIAGNOSIS — C34.90 MALIGNANT NEOPLASM OF UNSPECIFIED PART OF UNSPECIFIED BRONCHUS OR LUNG: ICD-10-CM

## 2024-10-11 LAB — GLUCOSE SERPL-MCNC: 131 MG/DL (ref 70–110)

## 2024-10-11 PROCEDURE — 78815 PET IMAGE W/CT SKULL-THIGH: CPT | Mod: TC,PS,PN

## 2024-10-11 PROCEDURE — 78815 PET IMAGE W/CT SKULL-THIGH: CPT | Mod: 26,PS,, | Performed by: STUDENT IN AN ORGANIZED HEALTH CARE EDUCATION/TRAINING PROGRAM

## 2024-10-11 PROCEDURE — A9552 F18 FDG: HCPCS | Mod: PN | Performed by: INTERNAL MEDICINE

## 2024-10-11 RX ORDER — FLUDEOXYGLUCOSE F18 500 MCI/ML
12 INJECTION INTRAVENOUS
Status: COMPLETED | OUTPATIENT
Start: 2024-10-11 | End: 2024-10-11

## 2024-10-11 RX ADMIN — FLUDEOXYGLUCOSE F-18 10.8 MILLICURIE: 500 INJECTION INTRAVENOUS at 07:10

## 2024-10-11 NOTE — PROGRESS NOTES
PET Imaging Questionnaire    Are you a Diabetic? Recent Blood Sugar level? No    Are you anemic? Bone Marrow Stimulation Meds? No    Have you had a CT Scan, if so when & where was your last one? Yes -     Have you had a PET Scan, if so when & where was your last one? Yes -     Chemotherapy or currently on Chemotherapy? No    Radiation therapy? No    Surgical History:   Past Surgical History:   Procedure Laterality Date    by pass on leg       CARDIAC CATHETERIZATION      cardiac stents       COLONOSCOPY N/A 9/14/2017    Procedure: COLONOSCOPY;  Surgeon: Robb Yepez MD;  Location: [facility];  Service: Endoscopy;  Laterality: N/A; repeat in 5 years    ENDOBRONCHIAL ULTRASOUND N/A 9/27/2024    Procedure: ENDOBRONCHIAL ULTRASOUND (EBUS);  Surgeon: Trinity Rangel MD;  Location: [facility] FLR;  Service: Pulmonary;  Laterality: N/A;    ROBOTIC BRONCHOSCOPY N/A 9/27/2024    Procedure: ROBOTIC BRONCHOSCOPY;  Surgeon: Trinity Rangel MD;  Location: [facility] OR 2ND FLR;  Service: Pulmonary;  Laterality: N/A;    UPPER GASTROINTESTINAL ENDOSCOPY  09/14/2017    Dr. Yepez        Have you been fasting for at least 6 hours? Yes    Is there any chance you may be pregnant or breastfeeding? No    Assay: 11.81 MCi@:740   Injection Site:rt ac     Residual: 1.01 mCi@: 742   Technologist: Vane Ryan Injected:10.8mCi

## 2024-10-15 NOTE — PROGRESS NOTES
"Subjective     Patient ID: Francois Otero Sr. is a 79 y.o. male.    Chief Complaint: Consult and Lung Cancer  Referring physician Dr. 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 Serial scans with interval growth however patient wanted to try something holistic first.     Most recent CT on 09/09/2024: Grossly unchanged right perihilar spiculated pulmonary nodules. Left pleural base nodularity slightly increased in size adjacent to the aortic arch.    Robotic bronchoscopy on 09/27/2024 with RUL and EPHRAIM positive for malignancy favor adenocarcinoma. EBUS with 4L,7, 11R negative.     PET scan on 10/11/2024: Hypermetabolic pleural based nodule in the left lung apex measuring 1.5 cm maximum SUV of 7.4, new compared to prior PET-CT and increased compared to recent chest CT. Two regions of hypermetabolic pleural thickening/nodularity along the left paramediastinal hemithorax measuring 3.8 x 1.3 cm with maximum SUV of 10 and 5.3 x 1.7 cm with maximum SUV of 12.  The more superior lesion is new compared to prior PET-CT and the more inferior lesion is significantly enlarged.  These lesions appear grossly similar to recent chest CT. Grossly stable hypermetabolic nodule in the right perihilar region measuring approximately 1.7 cm with maximum SUV of 9 (previously 10).  Persistent adjacent mildly hypermetabolic nodule measuring 0.9 cm with maximum SUV of 3.8 (previously 3), enlarged compared to prior PET-CT and stable compared to recent chest CT"    He comes in to discuss further management    He is here with his daughter, uses home oxygen at 3 liters nasal canula, needs oxygen even with minimal exertion.  ECOG PS is 1-2        Review of Systems   Constitutional:  Positive for appetite change and fatigue. Negative for unexpected weight change.   HENT:  Negative for mouth sores.    Eyes:  Negative for visual disturbance.   Respiratory:  Positive for cough " and shortness of breath.    Cardiovascular:  Negative for chest pain.   Gastrointestinal:  Negative for abdominal pain and diarrhea.   Genitourinary:  Negative for frequency.   Musculoskeletal:  Negative for back pain.   Integumentary:  Negative for rash.   Neurological:  Negative for headaches.   Hematological:  Negative for adenopathy.   Psychiatric/Behavioral:  The patient is not nervous/anxious.    All other systems reviewed and are negative.    Allergies:  Review of patient's allergies indicates:   Allergen Reactions    Lisinopril Edema    Penicillin g sodium      Other reaction(s): unknown. was told by his mother.    Penicillins     Sulfa (sulfonamide antibiotics)        Medications:  Current Outpatient Medications   Medication Sig Dispense Refill    aspirin (ECOTRIN) 325 MG EC tablet Take 325 mg by mouth once daily.      butalbital-acetaminophen-caffeine -40 mg (FIORICET, ESGIC) -40 mg per tablet Take 1 tablet by mouth every 4 (four) hours as needed for Headaches. 20 tablet 0    finasteride (PROSCAR) 5 mg tablet Take 1 tablet (5 mg total) by mouth once daily. 90 tablet 3    fluticasone propionate (FLONASE) 50 mcg/actuation nasal spray 1 spray by Each Nostril route once daily.      ipratropium (ATROVENT) 0.02 % nebulizer solution Take 2.5 mLs (500 mcg total) by nebulization 3 (three) times daily. INHALE THE CONTENTS OF 1 VIAL VIA NEBULIZER THREE TIMES DAILY 270 mL 8    levalbuterol (XOPENEX) 1.25 mg/3 mL nebulizer solution Take 3 mLs (1.25 mg total) by nebulization 3 (three) times daily. 270 mL 11    levoFLOXacin (LEVAQUIN) 750 MG tablet       NIFEdipine (PROCARDIA-XL) 60 MG (OSM) 24 hr tablet Take 1 tablet (60 mg total) by mouth once daily. 30 tablet 11    predniSONE (DELTASONE) 10 mg tablet pack PRN      predniSONE (DELTASONE) 10 MG tablet Take 1 tablet (10 mg total) by mouth once daily. 90 tablet 3    SYMBICORT 160-4.5 mcg/actuation HFAA Inhale 2 puffs into the lungs every 12 (twelve) hours.       tamsulosin (FLOMAX) 0.4 mg Cap Take 1 capsule (0.4 mg total) by mouth every evening. 90 capsule 3     No current facility-administered medications for this visit.       PMH:  Past Medical History:   Diagnosis Date    Anxiety 12/05/2023    Colon polyp     COPD (chronic obstructive pulmonary disease)     Coronary artery disease     COVID-19 virus detected 12/03/2020 12/03/2020    Hyperlipidemia     Hypertension     On home oxygen therapy     Pneumonia due to COVID-19 virus 12/03/2020 12/03/2020    Tobacco abuse     1 PPD X 59 Yrs    Vertigo        PSH:  Past Surgical History:   Procedure Laterality Date    by pass on leg       CARDIAC CATHETERIZATION      cardiac stents       COLONOSCOPY N/A 9/14/2017    Procedure: COLONOSCOPY;  Surgeon: Robb Yepez MD;  Location: [facility];  Service: Endoscopy;  Laterality: N/A; repeat in 5 years    ENDOBRONCHIAL ULTRASOUND N/A 9/27/2024    Procedure: ENDOBRONCHIAL ULTRASOUND (EBUS);  Surgeon: Trinity Rangel MD;  Location: [facility] OR 2ND FLR;  Service: Pulmonary;  Laterality: N/A;    ROBOTIC BRONCHOSCOPY N/A 9/27/2024    Procedure: ROBOTIC BRONCHOSCOPY;  Surgeon: Trinity Rangel MD;  Location: [facility] OR 2ND FLR;  Service: Pulmonary;  Laterality: N/A;    UPPER GASTROINTESTINAL ENDOSCOPY  09/14/2017    Dr. Yepez       FamHx:  Family History   Problem Relation Name Age of Onset    Cancer Mother emph     Emphysema Mother emph     Colon cancer Mother emph         unsure of age of diagnosis    Cataracts Mother emph     Heart disease Father      Cataracts Maternal Aunt      Crohn's disease Neg Hx      Ulcerative colitis Neg Hx      Stomach cancer Neg Hx      Esophageal cancer Neg Hx      Glaucoma Neg Hx      Retinal detachment Neg Hx      Macular degeneration Neg Hx      Strabismus Neg Hx         SocHx:  Social History     Socioeconomic History    Marital status:      Spouse name: Heather Angel    Number of children: 2   Tobacco Use    Smoking status: Former     Current  packs/day: 0.00     Types: Cigarettes     Quit date: 9/3/2015     Years since quittin.1    Smokeless tobacco: Never   Substance and Sexual Activity    Alcohol use: Yes     Alcohol/week: 0.0 standard drinks of alcohol     Comment: rarely     Drug use: No    Sexual activity: Not Currently     Partners: Female     Social Drivers of Health     Financial Resource Strain: Medium Risk (10/16/2024)    Overall Financial Resource Strain (CARDIA)     Difficulty of Paying Living Expenses: Somewhat hard   Food Insecurity: No Food Insecurity (10/16/2024)    Hunger Vital Sign     Worried About Running Out of Food in the Last Year: Never true     Ran Out of Food in the Last Year: Never true   Transportation Needs: No Transportation Needs (2023)    PRAPARE - Transportation     Lack of Transportation (Medical): No     Lack of Transportation (Non-Medical): No   Physical Activity: Insufficiently Active (10/16/2024)    Exercise Vital Sign     Days of Exercise per Week: 4 days     Minutes of Exercise per Session: 10 min   Stress: No Stress Concern Present (10/16/2024)    [employer] - Occupational Stress Questionnaire     Feeling of Stress : Not at all   Housing Stability: Low Risk  (2023)    Housing Stability Vital Sign     Unable to Pay for Housing in the Last Year: No     Number of Places Lived in the Last Year: 1     Unstable Housing in the Last Year: No          Objective     Physical Exam  Vitals reviewed.   Constitutional:       Appearance: He is well-developed.   HENT:      Mouth/Throat:      Pharynx: No oropharyngeal exudate.   Cardiovascular:      Rate and Rhythm: Normal rate.      Heart sounds: Normal heart sounds.   Pulmonary:      Effort: Pulmonary effort is normal.      Breath sounds: Normal breath sounds. No wheezing.   Abdominal:      General: Bowel sounds are normal.      Palpations: Abdomen is soft.      Tenderness: There is no abdominal tenderness.   Musculoskeletal:          General: No tenderness.   Lymphadenopathy:      Cervical: No cervical adenopathy.   Skin:     General: Skin is warm and dry.      Findings: No rash.   Neurological:      Mental Status: He is alert and oriented to person, place, and time.      Coordination: Coordination normal.   Psychiatric:         Thought Content: Thought content normal.         Judgment: Judgment normal.              LABS:  WBC   Date Value Ref Range Status   12/19/2023 27.90 (H) 3.90 - 12.70 K/uL Final     Hemoglobin   Date Value Ref Range Status   12/19/2023 11.5 (L) 14.0 - 18.0 g/dL Final     POC Hematocrit   Date Value Ref Range Status   12/08/2023 47 36 - 54 %PCV Final     Hematocrit   Date Value Ref Range Status   12/19/2023 33.8 (L) 40.0 - 54.0 % Final     Platelets   Date Value Ref Range Status   12/19/2023 164 150 - 450 K/uL Final     Gran # (ANC)   Date Value Ref Range Status   12/17/2023 11.0 (H) 1.8 - 7.7 K/uL Final     Gran %   Date Value Ref Range Status   12/19/2023 48.0 38.0 - 73.0 % Final       Chemistry        Component Value Date/Time     12/19/2023 0407    K 3.4 (L) 12/19/2023 0407     12/19/2023 0407    CO2 24 12/19/2023 0407    BUN 33 (H) 12/19/2023 0407    CREATININE 0.86 02/07/2024 1127    GLU 92 12/19/2023 0407        Component Value Date/Time    CALCIUM 8.5 (L) 12/19/2023 0407    ALKPHOS 58 12/19/2023 0407    AST 34 12/19/2023 0407    ALT 79 (H) 12/19/2023 0407    BILITOT 0.4 12/19/2023 0407    ESTGFRAFRICA >60 03/09/2022 1126    EGFRNONAA >60 03/09/2022 1126          Assessment and Plan     1. Adenocarcinoma, lung, right  -     Ambulatory referral/consult to Hematology / Oncology  -     CBC w/ DIFF; Future; Expected date: 10/16/2024  -     CMP; Future; Expected date: 10/16/2024  -     Magnesium; Future; Expected date: 10/16/2024    2. Adenocarcinoma of lung, left  -     Ambulatory referral/consult to Hematology / Oncology    3. Adenocarcinoma, lung, left  Overview:  Clinically appears to have mediastinal LN  involvment abutting tumor.  Obtain PET.  [place] [facility] [place]    Med/onc and heme onc consult    Also with adenocarcinoma of the right upper lobe, separate primary or metastasis?      Route Chart for Scheduling    Med Onc Chart Routing      Follow up with physician . Schedule to see me around 10/30/2024   Follow up with [facility]    Infusion scheduling note    Injection scheduling note    Labs    Imaging    Pharmacy appointment    Other referrals                      Mr. Otero comes in to review his new diagnosis of lung adenocarcinoma, he has bilateral biopsies of right upper and left upper lobes indicating lung adenocarcinoma.  At the current stage it is unresectable and radiation is not feasible.  Reviewed pathology molecular testing has been sent to [facility] for NGS and PDL1 testing has failed.  Blood tempus was drawn on 10/11/2024 and it is pending.  Reviewed in tumor board today to see if additional biopsy can be safely done, he is felt to be a high-risk for biopsy.  Options for treatment include chemotherapy versus targeted therapy versus immunotherapy versus chemo and immunotherapy.   Final decision will be made based on the results of the molecular testing.  He is not a candidate for chemo and immunotherapy combination but he maybe able to receive chemotherapy by itself or immunotherapy by itself.  Discussed starting with chemotherapy while we are waiting on the molecular tests to decide if he is a candidate for immunotherapy however he would like to wait until all the results have returned to make a decision on treatment.    Results of molecular testing are expected next week so will plan on seeing him the week of 10/28 to review results and formulate a treatment plan    Visit today included increased complexity associated with the care of the episodic problem systemic therapy addressed and managing the longitudinal care of the patient due to the serious and/or complex managed problem(s) non-small-cell lung  cancer    Above plan extensively discussed with the patient and his accompanying daughter and all questions were answered to their satisfaction

## 2024-10-15 NOTE — PROGRESS NOTES
[facility] Consult Note    Referring provider: Trinity Rangel MD    Assessment:  Francois Otero Sr. is a 79 y.o. male with metastatic adenocarcinoma. The multifocal, pleural nodularity on the left sided disease is concerning for ANSELMO. He also has biopsy proven adenocarcinoma of the RUL (M1 vs separate primary).   ECOG: (2) Ambulatory and capable of self care, unable to carry out work activity, up and about > 50% or waking hours      Plan:  Needs MRI brain. He declined due to claustrophobia but was amendable to CT head.   Treatment options were discussed with the patient including systemic therapy, radiotherapy and some combination thereof.  He will meet with medical oncology today. I favor systemic therapy. Will review at [facility] today.  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   3/11/24: PET/CT with uptake in the RUL, with an adjacent satellite nodule, as well as a medial EPHRAIM pulmonary nodule. No mediastinal enlargement or avidity  Elected to proceed with holistic therapy  7/16/2024: CT chest with progression along the left pleura and slight progression in the RUL  9/27/24: Bronch and EBUS  Op note: 1.4 cm RUL nodule biopsied, 3.4 cm left apical posterior EPHRAIM nodule biopsied.  Path: RUL with adenocarcinoma  EPHRAIM with adenocarcinoma. 4L, 7, 11R benign with lymphocytes present  10/11/24: PET/CT with multifocal disease in the left that appears to be along the pleura and FDG avid RUL nodule with anterior extension vs satellite nodule.       Possibility of pregnancy: N/A  History of prior irradiation: No  History of prior systemic anti-cancer therapy: No  History of collagen vascular disease: No  Implanted electronic device (pacer/defib/nerve stimulator): No     History of Present Illness:  Francois Otero Sr. presents today to discuss the role of radiotherapy.     He has baseline shortness of breath,  on 3L nc. Exercise tolerance is becoming more limited.  No worsening cough, hemoptysis, weight loss. No other complaints. He has remote personal hx of asbestos, but wife and mother with asbestos and NSCLC attributed to washing his step father's clothes.    Here with daughter.       Review of Systems:  ROS as above    Social History:  Social History     Tobacco Use    Smoking status: Former     Current packs/day: 0.00     Types: Cigarettes     Quit date: 9/3/2015     Years since quittin.1    Smokeless tobacco: Never   Substance Use Topics    Alcohol use: Yes     Alcohol/week: 0.0 standard drinks of alcohol     Comment: rarely     Drug use: No       Past Medical History:  Past Medical History:   Diagnosis Date    Anxiety 2023    Colon polyp     COPD (chronic obstructive pulmonary disease)     Coronary artery disease     COVID-19 virus detected 2020    Hyperlipidemia     Hypertension     On home oxygen therapy     Pneumonia due to COVID-19 virus 2020    Tobacco abuse     1 PPD X 58 Yrs    Vertigo        Past Surgical History:   Procedure Laterality Date    by pass on leg       CARDIAC CATHETERIZATION      cardiac stents       COLONOSCOPY N/A 2017    Procedure: COLONOSCOPY;  Surgeon: Robb Yepez MD;  Location: [facility];  Service: Endoscopy;  Laterality: N/A; repeat in 5 years    ENDOBRONCHIAL ULTRASOUND N/A 2024    Procedure: ENDOBRONCHIAL ULTRASOUND (EBUS);  Surgeon: Trinity Rangel MD;  Location: [address];  Service: Pulmonary;  Laterality: N/A;    ROBOTIC BRONCHOSCOPY N/A 2024    Procedure: ROBOTIC BRONCHOSCOPY;  Surgeon: Trinity Rangel MD;  Location: [address];  Service: Pulmonary;  Laterality: N/A;    UPPER GASTROINTESTINAL ENDOSCOPY  2017    Dr. Yepez         Medications:  Current Outpatient Medications on File Prior to Visit   Medication Sig Dispense Refill    aspirin (ECOTRIN) 325 MG EC tablet Take 325 mg by mouth once  "daily.      butalbital-acetaminophen-caffeine -40 mg (FIORICET, ESGIC) -40 mg per tablet Take 1 tablet by mouth every 4 (four) hours as needed for Headaches. 20 tablet 0    finasteride (PROSCAR) 5 mg tablet Take 1 tablet (5 mg total) by mouth once daily. 90 tablet 3    fluticasone propionate (FLONASE) 50 mcg/actuation nasal spray 1 spray by Each Nostril route once daily.      ipratropium (ATROVENT) 0.02 % nebulizer solution Take 2.5 mLs (500 mcg total) by nebulization 3 (three) times daily. INHALE THE CONTENTS OF 1 VIAL VIA NEBULIZER THREE TIMES DAILY 270 mL 8    levalbuterol (XOPENEX) 1.25 mg/3 mL nebulizer solution Take 3 mLs (1.25 mg total) by nebulization 3 (three) times daily. 270 mL 11    levoFLOXacin (LEVAQUIN) 750 MG tablet       NIFEdipine (PROCARDIA-XL) 60 MG (OSM) 24 hr tablet Take 1 tablet (60 mg total) by mouth once daily. 30 tablet 11    predniSONE (DELTASONE) 10 mg tablet pack PRN      predniSONE (DELTASONE) 10 MG tablet Take 1 tablet (10 mg total) by mouth once daily. 90 tablet 3    SYMBICORT 160-4.5 mcg/actuation HFAA Inhale 2 puffs into the lungs every 12 (twelve) hours.      tamsulosin (FLOMAX) 0.4 mg Cap Take 1 capsule (0.4 mg total) by mouth every evening. 90 capsule 3     No current facility-administered medications on file prior to visit.       Allergies:  Review of patient's allergies indicates:   Allergen Reactions    Lisinopril Edema    Penicillin g sodium      Other reaction(s): unknown. was told by his mother.    Penicillins     Sulfa (sulfonamide antibiotics)        Exam:  Vitals:    10/16/24 1046   BP: (!) 196/87   Patient Position: Sitting   Pulse: 79   Temp: (!) 92.2 °F (33.4 °C)   SpO2: (!) 89%   Weight: 52 kg (114 lb 10.2 oz)   Height: 5' 2" (1.575 m)     Constitutional: Pleasant 79 y.o. male in no acute distress.  Thin appearing Well groomed.   HEENT: Normocephalic and atraumatic   Cardiovascular: Upper extremities warm to touch  Lungs: No audible wheezing.  Normal " effort. Wearing NC  Musculoskeletal: No gross MSK deformities. In a wheelchair  Skin: No rashes appreciated.  Psych: Alert and oriented with appropriate mood and affect.  Neuro:   Grossly normal.    Data Review:  Information obtained from Francois Otero Sr. and via chart review.     Independent Interpretation of Test(s): PEt/CT from 10/11/24 was personally reviewed and shows progression in the EPHRAIM, appears to be progressing along the pleura, with multiple foci of disease.         Scot Blackburn MD  Radiation Oncology

## 2024-10-16 PROBLEM — C34.92 ADENOCARCINOMA OF LUNG, LEFT: Status: ACTIVE | Noted: 2024-01-01

## 2024-10-16 PROBLEM — C34.91 ADENOCARCINOMA, LUNG, RIGHT: Status: ACTIVE | Noted: 2024-01-01

## 2024-10-18 NOTE — TELEPHONE ENCOUNTER
----- Message from Sweta sent at 10/18/2024 12:05 PM CDT -----  Type:  Needs Medical Advice    Who Called: pt    Would the patient rather a call back or a response via [facility]? Call   Best Call Back Number: [phone]    Additional Information: Shortness of breath.  Have a question    Comments:  What to know of what hospital to go to in preference.  Very hard time breathing

## 2024-11-06 ENCOUNTER — DOCUMENTATION ONLY (OUTPATIENT)
Dept: RADIATION ONCOLOGY | Facility: CLINIC | Age: 79
End: 2024-11-06
Payer: MEDICARE

## 2024-11-06 NOTE — PROGRESS NOTES
Called and spoke with Mr. Swanson's daughter, gave condolences.     She presented outline of events, noting that he felt well when we saw him in clinic. Even had his supplemental oxygen turned off during visits. Had his CT head later that afternoon. Around midnight that night be began to experience shortness of breath. Family recommended going to ER, but he declined. He used his albuterol frequently that night and had symptomatic improvement. He began to have worsening respiratory symptoms again and started mucinex. The day of presentation to the ER, he was found by family to be gasping for air. Daughter was able to bring him to the ER ~40 minutes later where he was placed on steroids and BiPAP. Rest of documentation per ER notes. Of note, he was DNR/DNI.       Scot Blackburn MD  Radiation Oncology

## 2025-07-07 NOTE — PLAN OF CARE
[facility] - Cardiac Medical ICU  Initial Discharge Assessment       Primary Care Provider: Carley Kothari MD    Admission Diagnosis: Shortness of breath [R06.02]    Admission Date: 12/5/2023  Expected Discharge Date: 12/12/2023    Transition of Care Barriers: None    Payor: [employer] MANAGED MEDICARE / Plan: [employer] MEDICARE HMO / Product Type: Capitation /     No emergency contact information on file.    Discharge Plan A: Skilled Nursing Facility  Discharge Plan B: [facility] - [place], [place] - [address]  [address] [address]  Phone: [phone] Fax: [phone]    [facility] - [address] [address]  Phone: [phone] Fax: [phone]    [facility] - [facility] [address] [address] [facility]  [address] [address] [facility] [address]  Phone: [phone] Fax: [phone]      Initial Assessment (most recent)       Adult Discharge Assessment - 12/06/23 1533          Discharge Assessment    Assessment Type Discharge Planning Assessment     Confirmed/corrected address, phone number and insurance Yes     Confirmed Demographics Correct on Facesheet     Source of Information family     If unable to respond/provide information was family/caregiver contacted? Yes     Contact Name/Number chuy Acevedo/alayna# [phone]     When was your last doctors appointment? 11/04/23     Does patient/caregiver understand observation status Yes     Communicated RONEL with patient/caregiver Date not available/Unable to determine     Reason For Admission Shortness of Breath/COPD Exacerbation     People in Home alone     Do you expect to return to your current living situation? Yes     Do you have help at home or someone to help you manage your care at home? Yes     Who are your caregiver(s) and their phone number(s)? jalil Acevedo# [phone]     Prior to  hospitilization cognitive status: Alert/Oriented     Current cognitive status: Unable to Assess     Equipment Currently Used at Home oxygen     Readmission within 30 days? No     Patient currently being followed by outpatient case management? No     Do you currently have service(s) that help you manage your care at home? No     Do you take prescription medications? Yes     Do you have prescription coverage? Yes     Coverage [employer] medicare     Do you have any problems affording any of your prescribed medications? No     Is the patient taking medications as prescribed? yes     Who is going to help you get home at discharge? A family member     How do you get to doctors appointments? family or friend will provide     Are you on dialysis? No     Do you take coumadin? No     DME Needed Upon Discharge  other (see comments)   TBD    Discharge Plan discussed with: Adult children     Transition of Care Barriers None     Discharge Plan A Skilled Nursing Facility     Discharge Plan B Home Health        Physical Activity    On average, how many days per week do you engage in moderate to strenuous exercise (like a brisk walk)? Patient refused   Patient of bipap    On average, how many minutes do you engage in exercise at this level? Patient refused        Housing Stability    In the last 12 months, was there a time when you were not able to pay the mortgage or rent on time? No     In the last 12 months, how many places have you lived? 1     In the last 12 months, was there a time when you did not have a steady place to sleep or slept in a shelter (including now)? No        Transportation Needs    In the past 12 months, has lack of transportation kept you from medical appointments or from getting medications? No     In the past 12 months, has lack of transportation kept you from meetings, work, or from getting things needed for daily living? No        Food Insecurity    Within the past 12 months, you worried that your  food would run out before you got the money to buy more. Never true     Within the past 12 months, the food you bought just didn't last and you didn't have money to get more. Never true        Social Connections    In a typical week, how many times do you talk on the phone with family, friends, or neighbors? --   Patient on bipap    How often do you get together with friends or relatives? Three times a week     Do you belong to any clubs or organizations such as Judaism groups, unions, fraternal or athletic groups, or school groups? No     How often do you attend meetings of the clubs or organizations you belong to? Never     Are you , , , , never , or living with a partner?         Alcohol Use    Q1: How often do you have a drink containing alcohol? Never     Q2: How many drinks containing alcohol do you have on a typical day when you are drinking? Patient does not drink     Q3: How often do you have six or more drinks on one occasion? Never                   Discharge Plan A and Plan B have been determined by review of patient's clinical status, future medical and therapeutic needs, and coverage/benefits for post-acute care in coordination with multidisciplinary team members.    Spoke to daughter (Jennifer Cooley).  Patient lives alone. Post hospital stay adult children will be his support person and help in the home.  Patient has transportation at discharge with one of his adult children.  There have been no hospitalizations within the last 30 days per daughter. Verified patient's PCP and preferred Pharmacy.  Daughter states not on Coumadin and is not receiving dialysis.  All questions answered regarding Case Management Discharge Planning, daughter verbalized understanding.  Discharge booklet with SW's contact information given to daughter.    Alden Uriarte LMSW  [facility]  [phone]                      Yes

## (undated) DEVICE — ADAPTER VISION PROBE & SUCTION

## (undated) DEVICE — ADAPTER SWIVEL

## (undated) DEVICE — BOWL STERILE LARGE 32OZ

## (undated) DEVICE — LUBRICANT SURGILUBE 2 OZ

## (undated) DEVICE — NDL FLEXISION BIOPSY 21G

## (undated) DEVICE — KIT ANTIFOG W/SPONG & FLUID

## (undated) DEVICE — SYR SLIP TIP 20CC

## (undated) DEVICE — DRAPE THREE-QTR REINF 53X77IN

## (undated) DEVICE — NDL VIZISHOT 2 FLEX 19G

## (undated) DEVICE — SYS LABEL CORRECT MED

## (undated) DEVICE — SPONGE COTTON TRAY 4X4IN

## (undated) DEVICE — SYR 10CC LUER LOCK

## (undated) DEVICE — GOWN POLY REINF BRTH SLV XL

## (undated) DEVICE — CATH BRONCHOSCOPE F/BF

## (undated) DEVICE — CONTAINER SPECIMEN OR STER 4OZ

## (undated) DEVICE — PACK ECLIPSE SET-UP W/O DRAPE

## (undated) DEVICE — CONNECTOR SWIVEL

## (undated) DEVICE — NDL ASPIRATING VIZISHOT 20-40M

## (undated) DEVICE — TUBING SUC UNIV W/CONN 12FT

## (undated) DEVICE — PENCIL GOLF STERILE

## (undated) DEVICE — BAG ION VISION PROBE

## (undated) DEVICE — ALCOHOL BLEND 95%

## (undated) DEVICE — FORCEP JAW RADIAL PULM 2X100CM